# Patient Record
Sex: FEMALE | Race: WHITE | Employment: OTHER | URBAN - METROPOLITAN AREA
[De-identification: names, ages, dates, MRNs, and addresses within clinical notes are randomized per-mention and may not be internally consistent; named-entity substitution may affect disease eponyms.]

---

## 2017-01-09 ENCOUNTER — MYC MEDICAL ADVICE (OUTPATIENT)
Dept: PEDIATRICS | Facility: CLINIC | Age: 72
End: 2017-01-09

## 2017-01-09 DIAGNOSIS — E78.5 HYPERLIPIDEMIA LDL GOAL <70: Primary | ICD-10-CM

## 2017-01-09 DIAGNOSIS — R80.9 MICROALBUMINURIA: ICD-10-CM

## 2017-01-09 DIAGNOSIS — E11.42 TYPE 2 DIABETES, CONTROLLED, WITH PERIPHERAL NEUROPATHY (H): ICD-10-CM

## 2017-01-10 NOTE — TELEPHONE ENCOUNTER
Dr. Dietrich-order pending for Metformin 1000 mg twice daily vs Metformin ER-please sign if in agreement.  My chart message sent to patient to verify which [facility] address to send to?    Simvastatin 20 mg     Last Written Prescription Date: 04/15/16  Last Fill Quantity: 90, # refills: 3  Last Office Visit with [facility], [facility] or [facility] prescribing provider: 10/12/16       CHOL      126   4/15/2016  HDL       33   4/15/2016  LDL       27   4/15/2016  LDL       88   10/11/2012  TRIG      331   4/15/2016  CHOLHDLRATIO      3.9   4/10/2015  Patient has a three month supply remaining on current RX and then will need labs.    Lisinopril 40 mg      Last Written Prescription Date: 10/27/16  Last Fill Quantity: 90, # refills: 3  Last Office Visit with [facility], [facility] or [facility] prescribing provider: 10/12/16       POTASSIUM   Date Value Ref Range Status   10/12/2016 4.4 3.4 - 5.3 mmol/L Final     CREATININE   Date Value Ref Range Status   10/12/2016 0.83 0.52 - 1.04 mg/dL Final     BP Readings from Last 3 Encounters:   10/07/16 122/64   09/28/16 144/87   09/07/16 120/64   Patient still has 9 months of refills remaining on current RX.  VALERIE Queen RN

## 2017-01-13 NOTE — TELEPHONE ENCOUNTER
Don't want the one with that phone number because that is the one that the prescriptions must be mailed to not e-prescribed. Entered e[facility]prescribe [facility] contact.   Megan Fortune, RN

## 2017-01-15 RX ORDER — SIMVASTATIN 20 MG
20 TABLET ORAL AT BEDTIME
Qty: 90 TABLET | Refills: 2 | Status: SHIPPED | OUTPATIENT
Start: 2017-01-15 | End: 2017-11-15

## 2017-01-15 RX ORDER — LISINOPRIL 40 MG/1
40 TABLET ORAL DAILY
Qty: 90 TABLET | Refills: 2 | Status: SHIPPED | OUTPATIENT
Start: 2017-01-15 | End: 2017-10-30

## 2017-01-19 ENCOUNTER — TELEPHONE (OUTPATIENT)
Dept: PEDIATRICS | Facility: CLINIC | Age: 72
End: 2017-01-19

## 2017-01-19 DIAGNOSIS — E11.42 TYPE 2 DIABETES, CONTROLLED, WITH PERIPHERAL NEUROPATHY (H): Primary | ICD-10-CM

## 2017-01-19 RX ORDER — METFORMIN HCL 500 MG
1000 TABLET, EXTENDED RELEASE 24 HR ORAL 2 TIMES DAILY WITH MEALS
Qty: 360 TABLET | Refills: 0 | Status: SHIPPED | OUTPATIENT
Start: 2017-01-19 | End: 2017-04-18

## 2017-01-19 NOTE — TELEPHONE ENCOUNTER
Received fax from pharmacy that patient had been receiving metformin ER.  Script sent for ER formulation.  Niesha Dietrich MD

## 2017-01-21 NOTE — TELEPHONE ENCOUNTER
Pt's understanding was that medication would be changed to regular Metformin instead of Metformin ER due to insurance coverage. See 1/15/2017 [place] message with Dr Dietrich. Pt has 8 pills left of Metformin ER and is requesting change to regular Metformin. Pt states her blood sugar was 170 this morning pre breakfast.     Keiry Menard RN/[facility]

## 2017-01-23 NOTE — TELEPHONE ENCOUNTER
I spoke with Megan at [facility] and cancelled order for the Metformin  mg-2 tabs bid.  Advised that new RX has been sent for Metformin 500 mg-2 tablets twice daily.  Megan verified that they have received the new RX for Metformin and this will be shipped out this week.  VALERIE Queen RN

## 2017-01-23 NOTE — TELEPHONE ENCOUNTER
Patient is calling back stating she was told the Metformin XR is going to be shipped today and the order for the Metformin was cancelled.  I called [facility] again, and spoke with a pharmacy luzmaria-Toya (sandi), she verified that the Metformin XR was not cancelled and the Metformin had been cancelled.  Advised that I spoke with Megan earlier and was told that the Metformin XR was cancelled and would not be shipped.  She read back the order for the Metformin correctly.  I gave verbal order to Toya for the   metFORMIN (GLUCOPHAGE) 500 MG tablet 360 tablet 0 1/21/2017  No      Sig: Take 2 tablets (1,000 mg) by mouth 2 times daily (with meals)     I was then transferred to Medicare Member service, and spoke with Niesha (customer service) to cancel the order for the Metformin XR.  She has submitted to have the shipment of the Metformin XR stopped, and will process new order for Metformin.  Patient can not log into account in 24 hrs to see that Metformin XR was cancelled.  Metformin will be shipped and she should receive this in 5-6 days.    Patient notified.  Patient is out of medication..  One month medication sent to local pharmacy.  VALERIE Queen RN

## 2017-04-06 DIAGNOSIS — E78.5 HYPERLIPIDEMIA LDL GOAL <70: ICD-10-CM

## 2017-04-06 DIAGNOSIS — E11.42 TYPE 2 DIABETES, CONTROLLED, WITH PERIPHERAL NEUROPATHY (H): ICD-10-CM

## 2017-04-06 LAB
ALBUMIN SERPL-MCNC: 3.7 G/DL (ref 3.4–5)
ALP SERPL-CCNC: 93 U/L (ref 40–150)
ALT SERPL W P-5'-P-CCNC: 45 U/L (ref 0–50)
ANION GAP SERPL CALCULATED.3IONS-SCNC: 9 MMOL/L (ref 3–14)
AST SERPL W P-5'-P-CCNC: 54 U/L (ref 0–45)
BILIRUB SERPL-MCNC: 0.7 MG/DL (ref 0.2–1.3)
BUN SERPL-MCNC: 16 MG/DL (ref 7–30)
CALCIUM SERPL-MCNC: 10.4 MG/DL (ref 8.5–10.1)
CHLORIDE SERPL-SCNC: 102 MMOL/L (ref 94–109)
CHOLEST SERPL-MCNC: 131 MG/DL
CO2 SERPL-SCNC: 25 MMOL/L (ref 20–32)
CREAT SERPL-MCNC: 0.88 MG/DL (ref 0.52–1.04)
GFR SERPL CREATININE-BSD FRML MDRD: 63 ML/MIN/1.7M2
GLUCOSE SERPL-MCNC: 194 MG/DL (ref 70–99)
HBA1C MFR BLD: 7.8 % (ref 4.3–6)
HDLC SERPL-MCNC: 34 MG/DL
LDLC SERPL CALC-MCNC: 34 MG/DL
NONHDLC SERPL-MCNC: 97 MG/DL
POTASSIUM SERPL-SCNC: 4.7 MMOL/L (ref 3.4–5.3)
PROT SERPL-MCNC: 7.4 G/DL (ref 6.8–8.8)
SODIUM SERPL-SCNC: 136 MMOL/L (ref 133–144)
TRIGL SERPL-MCNC: 314 MG/DL

## 2017-04-06 PROCEDURE — 80061 LIPID PANEL: CPT | Performed by: INTERNAL MEDICINE

## 2017-04-06 PROCEDURE — 83036 HEMOGLOBIN GLYCOSYLATED A1C: CPT | Performed by: INTERNAL MEDICINE

## 2017-04-06 PROCEDURE — 36415 COLL VENOUS BLD VENIPUNCTURE: CPT | Performed by: INTERNAL MEDICINE

## 2017-04-06 PROCEDURE — 80053 COMPREHEN METABOLIC PANEL: CPT | Performed by: INTERNAL MEDICINE

## 2017-04-18 ENCOUNTER — OFFICE VISIT (OUTPATIENT)
Dept: PEDIATRICS | Facility: CLINIC | Age: 72
End: 2017-04-18
Payer: COMMERCIAL

## 2017-04-18 VITALS
OXYGEN SATURATION: 97 % | DIASTOLIC BLOOD PRESSURE: 62 MMHG | TEMPERATURE: 97.1 F | BODY MASS INDEX: 37.95 KG/M2 | SYSTOLIC BLOOD PRESSURE: 106 MMHG | WEIGHT: 214.2 LBS | HEIGHT: 63 IN | HEART RATE: 96 BPM

## 2017-04-18 DIAGNOSIS — E83.52 HYPERCALCEMIA: ICD-10-CM

## 2017-04-18 DIAGNOSIS — Z00.00 ROUTINE GENERAL MEDICAL EXAMINATION AT A HEALTH CARE FACILITY: Primary | ICD-10-CM

## 2017-04-18 DIAGNOSIS — Z13.89 SCREENING FOR DIABETIC PERIPHERAL NEUROPATHY: ICD-10-CM

## 2017-04-18 DIAGNOSIS — E11.42 TYPE 2 DIABETES, CONTROLLED, WITH PERIPHERAL NEUROPATHY (H): ICD-10-CM

## 2017-04-18 DIAGNOSIS — E78.5 HYPERLIPIDEMIA LDL GOAL <70: ICD-10-CM

## 2017-04-18 DIAGNOSIS — I10 HTN, GOAL BELOW 140/90: ICD-10-CM

## 2017-04-18 PROCEDURE — 99397 PER PM REEVAL EST PAT 65+ YR: CPT | Performed by: INTERNAL MEDICINE

## 2017-04-18 PROCEDURE — 99207 C FOOT EXAM  NO CHARGE: CPT | Mod: 25 | Performed by: INTERNAL MEDICINE

## 2017-04-18 PROCEDURE — 99213 OFFICE O/P EST LOW 20 MIN: CPT | Mod: 25 | Performed by: INTERNAL MEDICINE

## 2017-04-18 NOTE — PATIENT INSTRUCTIONS
Check sugars daily, sometimes first thing in the am and sometimes 2 hrs after dinner.    Try limiting sweets to every other day.          Preventive Health Recommendations    Female Ages 65 +    Yearly exam:     See your health care provider every year in order to  o Review health changes.   o Discuss preventive care.    o Review your medicines if your doctor has prescribed any.      You no longer need a yearly Pap test unless you've had an abnormal Pap test in the past 10 years. If you have vaginal symptoms, such as bleeding or discharge, be sure to talk with your provider about a Pap test.      Every 1 to 2 years, have a mammogram.  If you are over 69, talk with your health care provider about whether or not you want to continue having screening mammograms.      Every 10 years, have a colonoscopy. Or, have a yearly FIT test (stool test). These exams will check for colon cancer.       Have a cholesterol test every 5 years, or more often if your doctor advises it.       Have a diabetes test (fasting glucose) every three years. If you are at risk for diabetes, you should have this test more often.       At age 65, have a bone density scan (DEXA) to check for osteoporosis (brittle bone disease).    Shots:    Get a flu shot each year.    Get a tetanus shot every 10 years.    Talk to your doctor about your pneumonia vaccines. There are now two you should receive - Pneumovax (PPSV 23) and Prevnar (PCV 13).    Talk to your doctor about the shingles vaccine.    Talk to your doctor about the hepatitis B vaccine.    Nutrition:     Eat at least 5 servings of fruits and vegetables each day.      Eat whole-grain bread, whole-wheat pasta and brown rice instead of white grains and rice.      Talk to your provider about Calcium and Vitamin D.     Lifestyle    Exercise at least 150 minutes a week (30 minutes a day, 5 days a week). This will help you control your weight and prevent disease.      Limit alcohol to one drink per  day.      No smoking.       Wear sunscreen to prevent skin cancer.       See your dentist twice a year for an exam and cleaning.      See your eye doctor every 1 to 2 years to screen for conditions such as glaucoma, macular degeneration and cataracts.

## 2017-04-18 NOTE — PROGRESS NOTES
"  SUBJECTIVE:                                                            Debi Mo is a 72 year old female who presents for Preventive Visit.  Are you in the first 12 months of your Medicare Part B coverage?  No    Healthy Habits:    Do you get at least three servings of calcium containing foods daily (dairy, green leafy vegetables, etc.)? no    Amount of exercise or daily activities, outside of work: 0 day(s) per week    Problems taking medications regularly No    Medication side effects: Yes loose stools    Have you had an eye exam in the past two years? yes    Do you see a dentist twice per year? yes    Do you have sleep apnea, excessive snoring or daytime drowsiness?no    COGNITIVE SCREEN  1) Repeat 3 items (Banana, Sunrise, Chair)    2) Clock draw: NORMAL  3) 3 item recall: Recalls 3 objects  Results: 3 items recalled: COGNITIVE IMPAIRMENT LESS LIKELY    Mini-CogTM Copyright S Johanna. Licensed by the author for use in [place] [employer]; reprinted with permission (jonathon@[facility]). All rights reserved.      1) a1c up again at 7.8.  Has stopped checking blood sugars, feels that she \"eating more of the bad stuff\".  Did not start exercising.  Notes that her  broke, feels that has limited her exercise.  Would like to check sugars once a day.      Diabetes Follow-up      Patient is checking blood sugars: not at all    Diabetic concerns: None     Symptoms of hypoglycemia (low blood sugar): none     Paresthesias (numbness or burning in feet) or sores: Yes numbness     Date of last diabetic eye exam: has scheduled for 4/21/17     Hyperlipidemia Follow-Up      Rate your low fat/cholesterol diet?: not monitoring fat    Taking statin?  Yes, no muscle aches from statin    Other lipid medications/supplements?:  none     Hypertension Follow-up      Outpatient blood pressures are being checked at home.  Results are variable, sometimes high, sometimes low.    Low Salt Diet: not monitoring salt "         Reviewed and updated as needed this visit by clinical staff  Tobacco  Allergies  Meds  Med Hx  Surg Hx  Fam Hx  Soc Hx        Reviewed and updated as needed this visit by Provider        Social History   Substance Use Topics     Smoking status: Never Smoker     Smokeless tobacco: Never Used     Alcohol use No       The patient does not drink >3 drinks per day nor >7 drinks per week.    Today's PHQ-2 Score:   PHQ-2 ( 1999 [employer]) 7/12/2016 4/15/2016   Q1: Little interest or pleasure in doing things 0 0   Q2: Feeling down, depressed or hopeless 0 0   PHQ-2 Score 0 0       Do you feel safe in your environment - Yes    Do you have a Health Care Directive?: Yes: Advance Directive has been received and scanned.    Current providers sharing in care for this patient include:   Patient Care Team:  Niesha Dietrich MD as PCP - General (Pediatrics)  Aicha Doshi RN as       Hearing impairment: Yes, Difficulty following a conversation in a noisy restaurant or crowded room.    Ability to successfully perform activities of daily living: Yes, no assistance needed     Fall risk:  Fallen 2 or more times in the past year?: No  Any fall with injury in the past year?: No    Home safety:  none identified        The following health maintenance items are reviewed in Epic and correct as of today:  Health Maintenance   Topic Date Due     EYE EXAM Q1 YEAR( NO INBASKET)  04/06/2017     FOOT EXAM Q1 YEAR( NO INBASKET)  04/15/2017     FALL RISK ASSESSMENT  07/12/2017     MICROALBUMIN Q1 YEAR( NO INBASKET)  07/12/2017     INFLUENZA VACCINE (SYSTEM ASSIGNED)  09/01/2017     A1C Q6 MO( NO INBASKET)  10/06/2017     MAMMO SCREEN Q2 YR (SYSTEM ASSIGNED)  03/16/2018     CREATININE Q1 YEAR (NO INBASKET)  04/06/2018     LIPID MONITORING Q1 YEAR( NO INBASKET)  04/06/2018     DEXA Q5 YR  10/10/2018     TSH W/ FREE T4 REFLEX Q2 YEAR (NO INBASKET)  10/12/2018     TETANUS IMMUNIZATION (SYSTEM ASSIGNED)  06/01/2019      "COLON CANCER SCREEN (SYSTEM ASSIGNED)  01/01/2020     ADVANCE DIRECTIVE PLANNING Q5 YRS (NO INBASKET)  12/06/2021     PNEUMOCOCCAL  Completed     HEPATITIS C SCREENING  Completed         Pneumonia Vaccine:utd  Mammogram Screening: Patient over age 50, mutual decision to screen reflected in health maintenance.     ROS:  Constitutional, HEENT, cardiovascular, pulmonary, gi and gu systems are negative, except as otherwise noted.    Problem list, Medication list, Allergies, and Medical/Social/Surgical histories reviewed in [facility] and updated as appropriate.  OBJECTIVE:                                                            /62 (BP Location: Right arm, Patient Position: Chair, Cuff Size: Adult Large)  Pulse 96  Temp 97.1  F (36.2  C) (Tympanic)  Ht 5' 3\" (1.6 m)  Wt 214 lb 3.2 oz (97.2 kg)  SpO2 97%  BMI 37.94 kg/m2 Estimated body mass index is 37.94 kg/(m^2) as calculated from the following:    Height as of this encounter: 5' 3\" (1.6 m).    Weight as of this encounter: 214 lb 3.2 oz (97.2 kg).  EXAM:   GENERAL: healthy, alert and no distress  EYES: Eyes grossly normal to inspection, PERRL and conjunctivae and sclerae normal  HENT: ear canals and TM's normal, nose and mouth without ulcers or lesions  NECK: no adenopathy, no asymmetry, masses, or scars and thyroid normal to palpation  RESP: lungs clear to auscultation - no rales, rhonchi or wheezes  BREAST: normal without masses, tenderness or nipple discharge and no palpable axillary masses or adenopathy  CV: regular rate and rhythm, normal S1 S2, no S3 or S4, no murmur, click or rub, no peripheral edema and peripheral pulses strong  ABDOMEN: soft, nontender, no hepatosplenomegaly, no masses and bowel sounds normal  MS: no gross musculoskeletal defects noted, no edema  SKIN: no suspicious lesions or rashes  NEURO: Normal strength and tone, mentation intact and speech normal  PSYCH: mentation appears normal, affect normal/bright  Diabetic foot exam: normal " "DP and PT pulses, no trophic changes or ulcerative lesions, normal sensory exam and normal monofilament exam    ASSESSMENT / PLAN:                                                              Routine care  -mammo utd  -colon utd  -imm utd      (E11.42) Type 2 diabetes, controlled, with peripheral neuropathy (H)  (primary encounter diagnosis)  -discussed increasing medications to bring down a1c  -pt would like to work on diet and exercise for 3 months  -pt agreed to testing sugars daily  -if a1c not better at next visit would consider adding actos to existing metformin   Plan: Hemoglobin A1c, metFORMIN (GLUCOPHAGE) 500 MG         tablet         (E83.52) Hypercalcemia  -not on any additional calcium  -will check hyperparathyroid labs with next lab draw in 3 months   Plan: Calcium, Vitamin D Deficiency, PTH  Intact         ([facility]), Phosphorus, Calcium ionized,         CANCELED: Calcium ionized          (I10) HTN, goal below 140/90  -bp well controlled  -renal function normal     (E78.5) Hyperlipidemia LDL goal <70  -cholesterol at goal on 20 simvastatin     (Z13.89) Screening for diabetic peripheral neuropathy  Plan: FOOT EXAM  NO CHARGE [46807.114]          End of Life Planning:  Patient currently has an advanced directive: Yes.  Practitioner is supportive of decision.    COUNSELING:  Reviewed preventive health counseling, as reflected in patient instructions       Regular exercise       Healthy diet/nutrition        Estimated body mass index is 37.94 kg/(m^2) as calculated from the following:    Height as of this encounter: 5' 3\" (1.6 m).    Weight as of this encounter: 214 lb 3.2 oz (97.2 kg).  Weight management plan: Discussed healthy diet and exercise guidelines and patient will follow up in 12 months in clinic to re-evaluate.   reports that she has never smoked. She has never used smokeless tobacco.      Appropriate preventive services were discussed with this patient, including applicable screening as " appropriate for cardiovascular disease, diabetes, osteopenia/osteoporosis, and glaucoma.  As appropriate for age/gender, discussed screening for colorectal cancer, prostate cancer, breast cancer, and cervical cancer. Checklist reviewing preventive services available has been given to the patient.    Reviewed patients plan of care and provided an AVS. The Intermediate Care Plan ( asthma action plan, low back pain action plan, and migraine action plan) for Debi meets the Care Plan requirement. This Care Plan has been established and reviewed with the Patient.    Counseling Resources:  ATP IV Guidelines  Pooled Cohorts Equation Calculator  Breast Cancer Risk Calculator  FRAX Risk Assessment  ICSI Preventive Guidelines  Dietary Guidelines for Americans, 2010  USDA's MyPlate  ASA Prophylaxis  Lung CA Screening    Niesha Dietrich MD  [facility]

## 2017-04-18 NOTE — NURSING NOTE
"Chief Complaint   Patient presents with     Medicare Visit       Initial /62 (BP Location: Right arm, Patient Position: Chair, Cuff Size: Adult Large)  Pulse 96  Temp 97.1  F (36.2  C) (Tympanic)  Ht 5' 3\" (1.6 m)  Wt 214 lb 3.2 oz (97.2 kg)  SpO2 97%  BMI 37.94 kg/m2 Estimated body mass index is 37.94 kg/(m^2) as calculated from the following:    Height as of this encounter: 5' 3\" (1.6 m).    Weight as of this encounter: 214 lb 3.2 oz (97.2 kg).  Medication Reconciliation: complete   Poppy Bowden LPN      "

## 2017-04-18 NOTE — PROGRESS NOTES
"SUBJECTIVE:                                                            Debi Mo is a 72 year old female who presents for Preventive Visit.      Are you in the first 12 months of your Medicare coverage?  No    HPI    {AWV Cognitive Screenin}    Reviewed and updated as needed this visit by clinical staff  Tobacco  Allergies  Meds  Med Hx  Surg Hx  Fam Hx  Soc Hx        Reviewed and updated as needed this visit by Provider        Social History   Substance Use Topics     Smoking status: Never Smoker     Smokeless tobacco: Never Used     Alcohol use No       {ETOH AUDIT:705684}    {Outside tests to abstract? :590498}    {additional problems to add:434954}    Today's PHQ-2 Score:   PHQ-2 (  [employer]) 2016   Q1: Little interest or pleasure in doing things 0   Q2: Feeling down, depressed or hopeless 0   PHQ-2 Score 0       Do you feel safe in your environment - {YES/NO/NA:510875}    Do you have a Health Care Directive?: {HEALTHCARE DIRECTIVE STATUS:830394}    Current providers sharing in care for this patient include:   Patient Care Team:  Niesha Dietrich MD as PCP - General (Pediatrics)  Aicha Doshi RN as       Hearing impairment: {NO/YES:398570}    Ability to successfully perform activities of daily living: {YES/NO (MEDICARE):505879::\"Yes, no assistance needed\"}     Fall risk:  {Document Fall Risk in the Assessments Section of the Navigator:339922}    Home safety:  {IPPE SAFETY CONCERNS:298403::\"none identified\"}  {If any of the above assessments are answered yes, consider ordering appropriate referrals:719363::\"click delete button to remove this line now\"}    The following health maintenance items are reviewed in Epic and correct as of today:  Health Maintenance   Topic Date Due     EYE EXAM Q1 YEAR( NO INBASKET)  2017     FOOT EXAM Q1 YEAR( NO INBASKET)  04/15/2017     FALL RISK ASSESSMENT  2017     MICROALBUMIN Q1 YEAR( NO INBASKET)  2017     " "INFLUENZA VACCINE (SYSTEM ASSIGNED)  2017     A1C Q6 MO( NO INBASKET)  10/06/2017     MAMMO SCREEN Q2 YR (SYSTEM ASSIGNED)  2018     CREATININE Q1 YEAR (NO INBASKET)  2018     LIPID MONITORING Q1 YEAR( NO INBASKET)  2018     DEXA Q5 YR  10/10/2018     TSH W/ FREE T4 REFLEX Q2 YEAR (NO INBASKET)  10/12/2018     TETANUS IMMUNIZATION (SYSTEM ASSIGNED)  2019     COLON CANCER SCREEN (SYSTEM ASSIGNED)  2020     ADVANCE DIRECTIVE PLANNING Q5 YRS (NO INBASKET)  2021     PNEUMOCOCCAL  Completed     HEPATITIS C SCREENING  Completed         {Decision Support:247103}     ROS:  {ROS COMP:662506}    {CHRONICPROBDATA:449124}  OBJECTIVE:                                                            /62 (BP Location: Right arm, Patient Position: Chair, Cuff Size: Adult Large)  Pulse 96  Temp 97.1  F (36.2  C) (Tympanic)  Ht 5' 3\" (1.6 m)  Wt 214 lb 3.2 oz (97.2 kg)  SpO2 97%  BMI 37.94 kg/m2 Estimated body mass index is 37.94 kg/(m^2) as calculated from the following:    Height as of this encounter: 5' 3\" (1.6 m).    Weight as of this encounter: 214 lb 3.2 oz (97.2 kg).  EXAM:   {Exam :852404}    ASSESSMENT / PLAN:                                                            {Diag Picklist:366603}    End of Life Planning:  Patient currently has an advanced directive: { :007065}    COUNSELING:  {Medicare Counselin}    {Blood Pressure - Adult Preventive:333651}    Estimated body mass index is 37.94 kg/(m^2) as calculated from the following:    Height as of this encounter: 5' 3\" (1.6 m).    Weight as of this encounter: 214 lb 3.2 oz (97.2 kg).  {Weight Management Plan -- Delete if patient has a normal BMI:632944}   reports that she has never smoked. She has never used smokeless tobacco.  {Tobacco Cessation -- Delete if patient is a non-smoker:036350}    Appropriate preventive services were discussed with this patient, including applicable screening as appropriate for " cardiovascular disease, diabetes, osteopenia/osteoporosis, and glaucoma.  As appropriate for age/gender, discussed screening for colorectal cancer, prostate cancer, breast cancer, and cervical cancer. Checklist reviewing preventive services available has been given to the patient.    Reviewed patients plan of care and provided an AVS. The {CarePlan:729025} for Debi meets the Care Plan requirement. This Care Plan has been established and reviewed with the {PATIENT, FAMILY MEMBER, CAREGIVER:081246}.    Counseling Resources:  ATP IV Guidelines  Pooled Cohorts Equation Calculator  Breast Cancer Risk Calculator  FRAX Risk Assessment  ICSI Preventive Guidelines  Dietary Guidelines for Americans, 2010  USDA's MyPlate  ASA Prophylaxis  Lung CA Screening    Niesha Dietrich MD  [facility]

## 2017-04-18 NOTE — MR AVS SNAPSHOT
After Visit Summary   4/18/2017    Debi Mo    MRN: [id number]           Patient Information     Date Of Birth          1945        Visit Information        Provider Department      4/18/2017 10:00 AM Niesha Dietrich MD [facility] [place]        Today's Diagnoses     Type 2 diabetes, controlled, with peripheral neuropathy (H)    -  1    Hypercalcemia        HTN, goal below 140/90        Hyperlipidemia LDL goal <70        Screening for diabetic peripheral neuropathy          Care Instructions    Check sugars daily, sometimes first thing in the am and sometimes 2 hrs after dinner.    Try limiting sweets to every other day.          Preventive Health Recommendations    Female Ages 65 +    Yearly exam:     See your health care provider every year in order to  o Review health changes.   o Discuss preventive care.    o Review your medicines if your doctor has prescribed any.      You no longer need a yearly Pap test unless you've had an abnormal Pap test in the past 10 years. If you have vaginal symptoms, such as bleeding or discharge, be sure to talk with your provider about a Pap test.      Every 1 to 2 years, have a mammogram.  If you are over 69, talk with your health care provider about whether or not you want to continue having screening mammograms.      Every 10 years, have a colonoscopy. Or, have a yearly FIT test (stool test). These exams will check for colon cancer.       Have a cholesterol test every 5 years, or more often if your doctor advises it.       Have a diabetes test (fasting glucose) every three years. If you are at risk for diabetes, you should have this test more often.       At age 65, have a bone density scan (DEXA) to check for osteoporosis (brittle bone disease).    Shots:    Get a flu shot each year.    Get a tetanus shot every 10 years.    Talk to your doctor about your pneumonia vaccines. There are now two you should receive - Pneumovax (PPSV 23) and  Prevnar (PCV 13).    Talk to your doctor about the shingles vaccine.    Talk to your doctor about the hepatitis B vaccine.    Nutrition:     Eat at least 5 servings of fruits and vegetables each day.      Eat whole-grain bread, whole-wheat pasta and brown rice instead of white grains and rice.      Talk to your provider about Calcium and Vitamin D.     Lifestyle    Exercise at least 150 minutes a week (30 minutes a day, 5 days a week). This will help you control your weight and prevent disease.      Limit alcohol to one drink per day.      No smoking.       Wear sunscreen to prevent skin cancer.       See your dentist twice a year for an exam and cleaning.      See your eye doctor every 1 to 2 years to screen for conditions such as glaucoma, macular degeneration and cataracts.        Follow-ups after your visit        Your next 10 appointments already scheduled     Apr 20, 2017 10:00 AM CDT   MA SCREENING DIGITAL BILATERAL with EAMA1   [facility] [place] ([facility])    [address]  [place] [address] [phone]           Do not use any powder, lotion or deodorant under your arms or on your breast. If you do, we will ask you to remove it before your exam.  Wear comfortable, two-piece clothing.  If you have any allergies, tell your care team.  Bring any previous mammograms from other facilities or have them mailed to the breast center.            Jul 13, 2017 10:00 AM CDT   LAB with EA LAB   [facility] [place] ([facility])    [address]  [address]  [place] [address] [phone]           Patient must bring picture ID.  Patient should be prepared to give a urine specimen  Please do not eat 10-12 hours before your appointment if you are coming in fasting for labs on lipids, cholesterol, or glucose (sugar).  Pregnant women should follow their Care Team instructions. Water with medications is okay. Do not drink coffee or other  fluids.   If you have concerns about taking  your medications, please ask at office or if scheduling via [email or web address], send a message by clicking on Secure Messaging, Message Your Care Team.            Jul 13, 2017 10:40 AM CDT   Office Visit with Niesha Dietrich MD   [facility] [place] ([facility])    [address]  [address]  [place] [address]   [phone]           Bring a current list of meds and any records pertaining to this visit.  For Physicals, please bring immunization records and any forms needing to be filled out.  Please arrive 10 minutes early to complete paperwork.              Future tests that were ordered for you today     Open Future Orders        Priority Expected Expires Ordered    Calcium ionized Routine  8/18/2017 4/18/2017    Hemoglobin A1c Routine  8/18/2017 4/18/2017    Calcium Routine  8/18/2017 4/18/2017    Vitamin D Deficiency Routine  8/18/2017 4/18/2017    PTH  Intact ([facility]) Routine  8/18/2017 4/18/2017    Phosphorus Routine  8/18/2017 4/18/2017            Who to contact     If you have questions or need follow up information about today's clinic visit or your schedule please contact [facility] directly at [phone].  Normal or non-critical lab and imaging results will be communicated to you by Carelandhart, letter or phone within 4 business days after the clinic has received the results. If you do not hear from us within 7 days, please contact the clinic through Carelandhart or phone. If you have a critical or abnormal lab result, we will notify you by phone as soon as possible.  Submit refill requests through [email or web address] or call your pharmacy and they will forward the refill request to us. Please allow 3 business days for your refill to be completed.          Additional Information About Your Visit        [email or web address] Information     [email or web address] gives you secure access to your electronic health record. If you see a primary care provider, you can  "also send messages to your care team and make appointments. If you have questions, please call your primary care clinic.  If you do not have a primary care provider, please call [phone] and they will assist you.        Care EveryWhere ID     This is your Care EveryWhere ID. This could be used by other organizations to access your [facility] medical records  [id number]        Your Vitals Were     Pulse Temperature Height Pulse Oximetry BMI (Body Mass Index)       96 97.1  F (36.2  C) (Tympanic) 5' 3\" (1.6 m) 97% 37.94 kg/m2        Blood Pressure from Last 3 Encounters:   04/18/17 106/62   10/07/16 122/64   09/28/16 144/87    Weight from Last 3 Encounters:   04/18/17 214 lb 3.2 oz (97.2 kg)   10/07/16 211 lb 8 oz (95.9 kg)   09/28/16 211 lb (95.7 kg)              We Performed the Following     Calcium ionized     FOOT EXAM  NO CHARGE [66583.114]          Today's Medication Changes          These changes are accurate as of: 4/18/17 11:09 AM.  If you have any questions, ask your nurse or doctor.               These medicines have changed or have updated prescriptions.        Dose/Directions    metFORMIN 500 MG tablet   Commonly known as:  GLUCOPHAGE   This may have changed:  Another medication with the same name was removed. Continue taking this medication, and follow the directions you see here.   Used for:  Type 2 diabetes, controlled, with peripheral neuropathy (H)   Changed by:  Niesha Dietrich MD        Dose:  1000 mg   Take 2 tablets (1,000 mg) by mouth 2 times daily (with meals)   Quantity:  120 tablet   Refills:  1       potassium citrate 10 MEQ (1080 MG) CR tablet   Commonly known as:  UROCIT-K   This may have changed:  Another medication with the same name was removed. Continue taking this medication, and follow the directions you see here.   Used for:  Calculus of kidney   Changed by:  Lester Ornoa MD        Dose:  10 mEq   Take 1 tablet (10 mEq) by mouth 3 times daily (with meals) "   Quantity:  270 tablet   Refills:  11         Stop taking these medicines if you haven't already. Please contact your care team if you have questions.     potassium citrate-citric acid 3948-9331 MG Packet   Commonly known as:  POLYCITRA-K/CYTRA K   Stopped by:  Niesha Dietrich MD                Where to get your medicines      These medications were sent to [facility] - [place], [place] - [address]  [address] [address]     Phone:  [phone]     metFORMIN 500 MG tablet                Primary Care Provider Office Phone # Fax #    Niesha Dietrich [phone]       [address] [place] [address]        Thank you!     Thank you for choosing [facility]  for your care. Our goal is always to provide you with excellent care. Hearing back from our patients is one way we can continue to improve our services. Please take a few minutes to complete the written survey that you may receive in the mail after your visit with us. Thank you!             Your Updated Medication List - Protect others around you: Learn how to safely use, store and throw away your medicines at www.disposemymeds.org.          This list is accurate as of: 4/18/17 11:09 AM.  Always use your most recent med list.                   Brand Name Dispense Instructions for use    blood glucose monitoring lancets     1 Box    Use to test blood sugars 1 times daily or as directed.       blood glucose monitoring meter device kit     1 kit    Use to test blood sugars 1 times daily or as directed.       blood glucose monitoring test strip    ONE TOUCH VERIO IQ    50 each    Use to test blood sugars 1 times daily or as directed.       CO Q 10 PO      Take 100 mg by mouth daily       cyanocobalamin 1000 MCG tablet    vitamin  B-12    100 tablet    Take 1 tablet by mouth daily.       lisinopril 40 MG tablet    PRINIVIL/ZESTRIL    90  tablet    Take 1 tablet (40 mg) by mouth daily       loratadine 10 MG tablet    CLARITIN    30 tablet    1 tab in the evening daily.       metFORMIN 500 MG tablet    GLUCOPHAGE    120 tablet    Take 2 tablets (1,000 mg) by mouth 2 times daily (with meals)       potassium citrate 10 MEQ (1080 MG) CR tablet    UROCIT-K    270 tablet    Take 1 tablet (10 mEq) by mouth 3 times daily (with meals)       simvastatin 20 MG tablet    ZOCOR    90 tablet    Take 1 tablet (20 mg) by mouth At Bedtime       VITAMIN D3 PO      Take 4,000 Units by mouth daily.

## 2017-04-20 ENCOUNTER — RADIANT APPOINTMENT (OUTPATIENT)
Dept: MAMMOGRAPHY | Facility: CLINIC | Age: 72
End: 2017-04-20
Attending: INTERNAL MEDICINE
Payer: COMMERCIAL

## 2017-04-20 DIAGNOSIS — Z12.31 VISIT FOR SCREENING MAMMOGRAM: ICD-10-CM

## 2017-04-20 PROCEDURE — G0202 SCR MAMMO BI INCL CAD: HCPCS | Mod: TC

## 2017-04-21 ENCOUNTER — TRANSFERRED RECORDS (OUTPATIENT)
Dept: HEALTH INFORMATION MANAGEMENT | Facility: CLINIC | Age: 72
End: 2017-04-21

## 2017-04-24 DIAGNOSIS — N20.0 CALCULUS OF KIDNEY: ICD-10-CM

## 2017-04-24 RX ORDER — POTASSIUM CITRATE 10 MEQ/1
10 TABLET, EXTENDED RELEASE ORAL
Qty: 270 TABLET | Refills: 1 | Status: SHIPPED | OUTPATIENT
Start: 2017-04-24 | End: 2017-09-26

## 2017-05-01 DIAGNOSIS — E11.42 TYPE 2 DIABETES, CONTROLLED, WITH PERIPHERAL NEUROPATHY (H): ICD-10-CM

## 2017-05-01 NOTE — TELEPHONE ENCOUNTER
Glucophage 500mg  Last Written Prescription Date: 4/18/17  Last Fill Quantity: 120, # refills: 1  Last Office Visit with [facility], [facility] or  Health prescribing provider:  4/18/17   Next 5 appointments (look out 90 days)     Jul 13, 2017 10:40 AM CDT   Office Visit with Niesha Dietrich MD   [facility] [place] ([facility])    [address] [place] [address]  [address] [place] [address] [phone]                   BP Readings from Last 3 Encounters:   04/18/17 106/62   10/07/16 122/64   09/28/16 144/87     Lab Results   Component Value Date    MICROL 54 07/12/2016     Lab Results   Component Value Date    UMALCR 29.94 07/12/2016     Creatinine   Date Value Ref Range Status   04/06/2017 0.88 0.52 - 1.04 mg/dL Final   ]  GFR Estimate   Date Value Ref Range Status   04/06/2017 63 >60 mL/min/1.7m2 Final     Comment:     Non  GFR Calc   10/12/2016 68 >60 mL/min/1.7m2 Final     Comment:     Non  GFR Calc   02/19/2016 42 (L) >60 mL/min/1.7m2 Final     Comment:     Non  GFR Calc     GFR Estimate If Black   Date Value Ref Range Status   04/06/2017 77 >60 mL/min/1.7m2 Final     Comment:      GFR Calc   10/12/2016 82 >60 mL/min/1.7m2 Final     Comment:      GFR Calc   02/19/2016 50 (L) >60 mL/min/1.7m2 Final     Comment:      GFR Calc     Lab Results   Component Value Date    CHOL 131 04/06/2017     Lab Results   Component Value Date    HDL 34 04/06/2017     Lab Results   Component Value Date    LDL 34 04/06/2017     Lab Results   Component Value Date    TRIG 314 04/06/2017     Lab Results   Component Value Date    CHOLHDLRATIO 3.9 04/10/2015     Lab Results   Component Value Date    AST 54 04/06/2017     Lab Results   Component Value Date    ALT 45 04/06/2017     Lab Results   Component Value Date    A1C 7.8 04/06/2017    A1C 7.1 10/12/2016    A1C 7.2 07/12/2016    A1C 7.6 04/15/2016    A1C 6.6 10/15/2015      Potassium   Date Value Ref Range Status   04/06/2017 4.7 3.4 - 5.3 mmol/L Final       **Patient needs to have a 90 day supply sent to the pharmacy per insurance.    Cari Bello,   [facility]

## 2017-05-02 NOTE — TELEPHONE ENCOUNTER
3 month mail order pharmacy.   Patient has OV scheduled 7/13 with pcp.    Prescription approved per [facility] Refill Protocol.    Nathalie Moon RN

## 2017-07-12 NOTE — PROGRESS NOTES
SUBJECTIVE:                                                    Debi Mo is a 72 year old female who presents to clinic today for the following health issues:      Diabetes Follow-up      Patient is checking blood sugars: 2-3 times a week Results: Am: 177-194s , Pm: 161-200s    Diabetic concerns: None     Symptoms of hypoglycemia (low blood sugar): none     Paresthesias (numbness or burning in feet) or sores: Yes      Date of last diabetic eye exam: 4/21/2017    Hyperlipidemia Follow-Up      Rate your low fat/cholesterol diet?: not monitoring fat    Taking statin?  Yes, muscle aches after starting statin, taking Co Q 10    Other lipid medications/supplements?:  none    Hypertension Follow-up      Outpatient blood pressures are not being checked.    Low Salt Diet: not monitoring salt    Vascular Disease Follow-up:  Coronary Artery Disease (CAD)      Chest pain or pressure, left side neck or arm pain: No    Shortness of breath/increased sweats/nausea with exertion: Yes harder to carry things up the stairs    Pain in calves walking 1-2 blocks: No    Worsened or new symptoms since last visit: No    Nitroglycerin use: no    Daily aspirin use: Yes      Amount of exercise or physical activity: None    Problems taking medications regularly: No    Medication side effects: muscle aches    Diet: regular (no restrictions)    1) fasting sugars running 180's-200, after dinner 180's to over 220.  No low blood sugars. switched from metformin ER to regular metformin for financial reasons. Is taking BID.  Diarrhea better than with XR version.   2)  Notes one episode where she had taken a hike which was too hard for her.  Felt nauseated, lost control of her bladder, stumbled a couple of times, had difficulty talking.  Ate some peanuts, got better once she got the air conditioner on and felt better in 5-10 min.  Was short of breath when she was climbing up on the trail.  No chest pain.    3) htn:  Checks bp periodically, has  "been \"normal\" but can't give me numbers.        Problem list and histories reviewed & adjusted, as indicated.  Additional history: as documented    Patient Active Problem List   Diagnosis     Seasonal allergic rhinitis     Advance Care Planning     Vitamin B12 deficiency without anemia     High triglycerides     Benign hypertension with CKD (chronic kidney disease) stage I     History of nephrolithiasis     Serum calcium elevated     Hyperlipidemia LDL goal <70     Peripheral neuropathy (H)     Health Care Home     LBBB (left bundle branch block)     Vitamin D deficiency     Microalbuminuria     Coronary artery disease involving native coronary artery without angina pectoris     Morbid obesity due to excess calories (H)     Type 2 diabetes mellitus with diabetic polyneuropathy, without long-term current use of insulin (H)     Past Surgical History:   Procedure Laterality Date     CARDIAC SURGERY      ANGIOGRAM     CHOLECYSTECTOMY       COLONOSCOPY       COMBINED CYSTOSCOPY, RETROGRADES, URETEROSCOPY, LASER HOLMIUM LITHOTRIPSY URETER(S), INSERT STENT Bilateral 9/28/2016    Procedure: COMBINED CYSTOSCOPY, RETROGRADES, URETEROSCOPY, LASER HOLMIUM LITHOTRIPSY URETER(S), INSERT STENT;  Surgeon: Lester Orona MD;  Location:  OR     CYSTOSCOPY, RETROGRADES, COMBINED  4/17/2013    Procedure: COMBINED CYSTOSCOPY, RETROGRADES;;  Surgeon: Lester Orona MD;  Location: [facility] OR     CYSTOSCOPY, URETEROSCOPY, COMBINED  11/12/2013    Procedure: COMBINED CYSTOSCOPY, URETEROSCOPY;  CYSTOSCOPY, LEFT URETEROSCOPY, LEFT EXTRACORPOREAL SHOCKWAVE LITHOTRIPSY  ;  Surgeon: Lester Orona MD;  Location: [facility] OR     EXTRACORPOREAL SHOCK WAVE LITHOTRIPSY (ESWL)  11/12/2013    Procedure: EXTRACORPOREAL SHOCK WAVE LITHOTRIPSY (ESWL);;  Surgeon: Lester Orona MD;  Location: [facility] OR     EXTRACORPOREAL SHOCK WAVE LITHOTRIPSY (ESWL) Bilateral 4/28/2015    Procedure: EXTRACORPOREAL SHOCK WAVE LITHOTRIPSY (ESWL);  " "Surgeon: Lester Orona MD;  Location: [facility] OR     EXTRACORPOREAL SHOCK WAVE LITHOTRIPSY, CYSTOSCOPY, INSERT STENT URETER(S), COMBINED  1/10/2012    Procedure:COMBINED EXTRACORPOREAL SHOCK WAVE LITHOTRIPSY, CYSTOSCOPY, INSERT STENT URETER(S); LEFT EXTRACORPOREAL SHOCKWAVE LITHOTRIPSY, LEFT STENT; Surgeon:LESTER ORONA; Location:[facility] OR     GALLBLADDER SURGERY  2009     HYSTERECTOMY VAGINAL  1993    left  the ovaries     LASER HOLMIUM LITHOTRIPSY URETER(S), INSERT STENT, COMBINED  4/17/2013    Procedure: COMBINED CYSTOSCOPY, URETEROSCOPY, LASER HOLMIUM LITHOTRIPSY URETER(S), INSERT STENT;  CYSTOSCOPY, Right URETEROSCOPY, LASER HOLMIUM LITHOTRIPSY URETER(S) standby only,Stone basketing, Right Retrogrades, ;  Surgeon: Lester Orona MD;  Location: [facility] OR     SHOULDER SURGERY  2009    left rotator cuff       Social History   Substance Use Topics     Smoking status: Never Smoker     Smokeless tobacco: Never Used     Alcohol use No     Family History   Problem Relation Age of Onset     Respiratory Mother      CHF     Alzheimer Disease Mother      Dementia     HEART DISEASE Father      Heart Attack     Respiratory Father      CHF     Breast Cancer Maternal Grandmother      Breast Cancer Paternal Grandmother      Arthritis Brother      Arthritis Sister      Hypertension Son      DIABETES Daughter      Arthritis Sister      Arthritis Sister            Reviewed and updated as needed this visit by clinical staff  Tobacco  Allergies  Meds  Med Hx       Reviewed and updated as needed this visit by Provider         ROS:  Constitutional, HEENT, cardiovascular, pulmonary, gi and gu systems are negative, except as otherwise noted.    OBJECTIVE:     /72 (BP Location: Left arm, Patient Position: Chair, Cuff Size: Adult Large)  Pulse 96  Temp 97.9  F (36.6  C) (Tympanic)  Ht 5' 3\" (1.6 m)  Wt 212 lb 3 oz (96.2 kg)  SpO2 96%  BMI 37.59 kg/m2  Body mass index is 37.59 kg/(m^2).  GENERAL: healthy, " alert and no distress  EYES: Eyes grossly normal to inspection, PERRL and conjunctivae and sclerae normal  HENT: ear canals and TM's normal, nose and mouth without ulcers or lesions  NECK: no adenopathy, no asymmetry, masses, or scars and thyroid normal to palpation  RESP: lungs clear to auscultation - no rales, rhonchi or wheezes  CV: regular rate and rhythm, normal S1 S2, no S3 or S4, no murmur, click or rub, no peripheral edema and peripheral pulses strong  ABDOMEN: soft, nontender, no hepatosplenomegaly, no masses and bowel sounds normal  MS: no gross musculoskeletal defects noted, no edema  Diabetic foot exam: normal DP and PT pulses, no trophic changes or ulcerative lesions and normal sensory exam    Diagnostic Test Results:  Results for orders placed or performed in visit on 07/13/17 (from the past 24 hour(s))   Hemoglobin A1c   Result Value Ref Range    Hemoglobin A1C 7.5 (H) 4.3 - 6.0 %       ASSESSMENT/PLAN:       (E11.42) Type 2 diabetes mellitus with diabetic polyneuropathy, without long-term current use of insulin (H)  (primary encounter diagnosis)  -a1c within goal range of under 8 on metformin alone  -no change to medications, work on diet and exercise to help with blood sugars  -also on ace, statin, asa  Plan: metFORMIN (GLUCOPHAGE) 500 MG tablet, blood         glucose monitoring (ONE TOUCH DELICA) lancets,         blood glucose monitoring (ONE TOUCH VERIO IQ)         test strip          (I25.10) Coronary artery disease involving native coronary artery of native heart without angina pectoris  -pt with known CAD and had spell of GARCIA and near syncope with exertion  -will schedule exercise stress prior to starting exercise program   Plan: Exercise Stress Echocardiogram            (R06.09) GARCIA (dyspnea on exertion)  -unclear if episode was heat exhaustion or related to cad  -will check stress test    (I12.9,  N18.1) Benign hypertension with CKD (chronic kidney disease) stage I  -bp controlled  -on  ace    FUTURE APPOINTMENTS:       - Follow-up visit in 3 months     Niesha Dietrich MD  [facility]

## 2017-07-13 ENCOUNTER — OFFICE VISIT (OUTPATIENT)
Dept: PEDIATRICS | Facility: CLINIC | Age: 72
End: 2017-07-13
Payer: COMMERCIAL

## 2017-07-13 VITALS
TEMPERATURE: 97.9 F | HEIGHT: 63 IN | OXYGEN SATURATION: 96 % | BODY MASS INDEX: 37.6 KG/M2 | HEART RATE: 96 BPM | DIASTOLIC BLOOD PRESSURE: 72 MMHG | SYSTOLIC BLOOD PRESSURE: 130 MMHG | WEIGHT: 212.19 LBS

## 2017-07-13 DIAGNOSIS — E11.42 TYPE 2 DIABETES, CONTROLLED, WITH PERIPHERAL NEUROPATHY (H): ICD-10-CM

## 2017-07-13 DIAGNOSIS — E11.42 TYPE 2 DIABETES MELLITUS WITH DIABETIC POLYNEUROPATHY, WITHOUT LONG-TERM CURRENT USE OF INSULIN (H): Primary | ICD-10-CM

## 2017-07-13 DIAGNOSIS — R06.09 DOE (DYSPNEA ON EXERTION): ICD-10-CM

## 2017-07-13 DIAGNOSIS — I10 HTN, GOAL BELOW 140/90: ICD-10-CM

## 2017-07-13 DIAGNOSIS — I12.9 BENIGN HYPERTENSION WITH CKD (CHRONIC KIDNEY DISEASE) STAGE I: ICD-10-CM

## 2017-07-13 DIAGNOSIS — E83.52 HYPERCALCEMIA: ICD-10-CM

## 2017-07-13 DIAGNOSIS — I25.10 CORONARY ARTERY DISEASE INVOLVING NATIVE CORONARY ARTERY OF NATIVE HEART WITHOUT ANGINA PECTORIS: ICD-10-CM

## 2017-07-13 DIAGNOSIS — N18.1 BENIGN HYPERTENSION WITH CKD (CHRONIC KIDNEY DISEASE) STAGE I: ICD-10-CM

## 2017-07-13 PROBLEM — E11.52 TYPE 2 DIABETES MELLITUS WITH DIABETIC PERIPHERAL ANGIOPATHY AND GANGRENE, WITHOUT LONG-TERM CURRENT USE OF INSULIN (H): Status: RESOLVED | Noted: 2017-07-13 | Resolved: 2017-07-13

## 2017-07-13 PROBLEM — E66.01 MORBID OBESITY DUE TO EXCESS CALORIES (H): Status: ACTIVE | Noted: 2017-07-13

## 2017-07-13 PROBLEM — E11.52 TYPE 2 DIABETES MELLITUS WITH DIABETIC PERIPHERAL ANGIOPATHY AND GANGRENE, WITHOUT LONG-TERM CURRENT USE OF INSULIN (H): Status: ACTIVE | Noted: 2017-07-13

## 2017-07-13 LAB
CA-I SERPL ISE-MCNC: 5.2 MG/DL (ref 4.4–5.2)
CALCIUM SERPL-MCNC: 10.1 MG/DL (ref 8.5–10.1)
CREAT UR-MCNC: 168 MG/DL
HBA1C MFR BLD: 7.5 % (ref 4.3–6)
MICROALBUMIN UR-MCNC: 31 MG/L
MICROALBUMIN/CREAT UR: 18.27 MG/G CR (ref 0–25)
PHOSPHATE SERPL-MCNC: 3.1 MG/DL (ref 2.5–4.5)
PTH-INTACT SERPL-MCNC: 33 PG/ML (ref 12–72)

## 2017-07-13 PROCEDURE — 83970 ASSAY OF PARATHORMONE: CPT | Performed by: INTERNAL MEDICINE

## 2017-07-13 PROCEDURE — 82330 ASSAY OF CALCIUM: CPT | Performed by: INTERNAL MEDICINE

## 2017-07-13 PROCEDURE — 82310 ASSAY OF CALCIUM: CPT | Performed by: INTERNAL MEDICINE

## 2017-07-13 PROCEDURE — 82306 VITAMIN D 25 HYDROXY: CPT | Performed by: INTERNAL MEDICINE

## 2017-07-13 PROCEDURE — 36415 COLL VENOUS BLD VENIPUNCTURE: CPT | Performed by: INTERNAL MEDICINE

## 2017-07-13 PROCEDURE — 99214 OFFICE O/P EST MOD 30 MIN: CPT | Performed by: INTERNAL MEDICINE

## 2017-07-13 PROCEDURE — 83036 HEMOGLOBIN GLYCOSYLATED A1C: CPT | Performed by: INTERNAL MEDICINE

## 2017-07-13 PROCEDURE — 84100 ASSAY OF PHOSPHORUS: CPT | Performed by: INTERNAL MEDICINE

## 2017-07-13 PROCEDURE — 82043 UR ALBUMIN QUANTITATIVE: CPT | Performed by: INTERNAL MEDICINE

## 2017-07-13 NOTE — NURSING NOTE
"Chief Complaint   Patient presents with     Diabetes       Initial /72 (BP Location: Left arm, Patient Position: Chair, Cuff Size: Adult Large)  Pulse 96  Temp 97.9  F (36.6  C) (Tympanic)  Ht 5' 3\" (1.6 m)  Wt 212 lb 3 oz (96.2 kg)  SpO2 96%  BMI 37.59 kg/m2 Estimated body mass index is 37.59 kg/(m^2) as calculated from the following:    Height as of this encounter: 5' 3\" (1.6 m).    Weight as of this encounter: 212 lb 3 oz (96.2 kg).  Medication Reconciliation: complete   Tessie Perez CMA    "

## 2017-07-13 NOTE — MR AVS SNAPSHOT
After Visit Summary   7/13/2017    Debi Mo    MRN: [id number]           Patient Information     Date Of Birth          1945        Visit Information        Provider Department      7/13/2017 10:40 AM Niesha Dietrich MD [facility]        Today's Diagnoses     Type 2 diabetes mellitus with diabetic polyneuropathy, without long-term current use of insulin (H)    -  1    Coronary artery disease involving native coronary artery of native heart without angina pectoris        GARCIA (dyspnea on exertion)        Benign hypertension with CKD (chronic kidney disease) stage I           Follow-ups after your visit        Your next 10 appointments already scheduled     Oct 10, 2017 10:30 AM CDT   LAB with EA LAB   [facility] ([facility])    [address] [place] [address]  [address]  [facility] [address] [phone]           Patient must bring picture ID.  Patient should be prepared to give a urine specimen  Please do not eat 10-12 hours before your appointment if you are coming in fasting for labs on lipids, cholesterol, or glucose (sugar).  Pregnant women should follow their Care Team instructions. Water with medications is okay. Do not drink coffee or other fluids.   If you have concerns about taking  your medications, please ask at office or if scheduling via [employer], send a message by clicking on Secure Messaging, Message Your Care Team.            Oct 10, 2017 11:00 AM CDT   Office Visit with Niesha Dietrich MD   [facility] ([facility])    [address]  [address]  [facility] [id number] [phone]           Bring a current list of meds and any records pertaining to this visit.  For Physicals, please bring immunization records and any forms needing to be filled out.  Please arrive 10 minutes early to complete paperwork.              Future tests that were ordered for you today     Open Future Orders   "      Priority Expected Expires Ordered    Exercise Stress Echocardiogram Routine  7/13/2018 7/13/2017            Who to contact     If you have questions or need follow up information about today's clinic visit or your schedule please contact [facility] directly at [phone].  Normal or non-critical lab and imaging results will be communicated to you by MyChart, letter or phone within 4 business days after the clinic has received the results. If you do not hear from us within 7 days, please contact the clinic through [employer] or phone. If you have a critical or abnormal lab result, we will notify you by phone as soon as possible.  Submit refill requests through [employer] or call your pharmacy and they will forward the refill request to us. Please allow 3 business days for your refill to be completed.          Additional Information About Your Visit        [employer] Information     [employer] gives you secure access to your electronic health record. If you see a primary care provider, you can also send messages to your care team and make appointments. If you have questions, please call your primary care clinic.  If you do not have a primary care provider, please call [phone] and they will assist you.        Care EveryWhere ID     This is your Care EveryWhere ID. This could be used by other organizations to access your [facility] medical records  [id number]        Your Vitals Were     Pulse Temperature Height Pulse Oximetry BMI (Body Mass Index)       96 97.9  F (36.6  C) (Tympanic) 5' 3\" (1.6 m) 96% 37.59 kg/m2        Blood Pressure from Last 3 Encounters:   07/13/17 130/72   04/18/17 106/62   10/07/16 122/64    Weight from Last 3 Encounters:   07/13/17 212 lb 3 oz (96.2 kg)   04/18/17 214 lb 3.2 oz (97.2 kg)   10/07/16 211 lb 8 oz (95.9 kg)                 Where to get your medicines      These medications were sent to [facility] [address] [place] [facility] [place], [place] - [address]  [address] " [place] [address]     Phone:  [phone]     blood glucose monitoring lancets    blood glucose monitoring test strip         These medications were sent to [facility] - [place], [place] - [address]  [address] [address]     Phone:  [phone]     metFORMIN 500 MG tablet          Primary Care Provider Office Phone # Fax #    Niesha Dietrich [phone]       [address] [place] [address]        Equal Access to Services     [facility]: Hadii aad ku hadasho Soomaali, waaxda luqadaha, qaybta kaalmada adeegyada, waxay idiin hayaan adeeg kharachirag garcia . So [facility] [phone].    ATENCIÓN: Si habla español, tiene a grissom disposición servicios gratuitos de asistencia lingüística. [facility] [phone].    We comply with applicable federal civil rights laws and [place] laws. We do not discriminate on the basis of race, color, national origin, age, disability sex, sexual orientation or gender identity.            Thank you!     Thank you for choosing [facility]  for your care. Our goal is always to provide you with excellent care. Hearing back from our patients is one way we can continue to improve our services. Please take a few minutes to complete the written survey that you may receive in the mail after your visit with us. Thank you!             Your Updated Medication List - Protect others around you: Learn how to safely use, store and throw away your medicines at www.disposemymeds.org.          This list is accurate as of: 7/13/17 11:28 AM.  Always use your most recent med list.                   Brand Name Dispense Instructions for use Diagnosis    aspirin 81 MG tablet      Take 81 mg by mouth daily        blood glucose monitoring lancets     100 each    Use to test blood sugars 1 times daily or as directed.    Type 2 diabetes mellitus with diabetic polyneuropathy, without long-term current use of  insulin (H)       blood glucose monitoring meter device kit     1 kit    Use to test blood sugars 1 times daily or as directed.    Type 2 diabetes, controlled, with peripheral neuropathy (H)       blood glucose monitoring test strip    ONE TOUCH VERIO IQ    50 each    Use to test blood sugars 1 times daily or as directed.    Type 2 diabetes mellitus with diabetic polyneuropathy, without long-term current use of insulin (H)       CO Q 10 PO      Take 100 mg by mouth daily        cyanocobalamin 1000 MCG tablet    vitamin  B-12    100 tablet    Take 1 tablet by mouth daily.    Vitamin B12 deficiency (non anaemic)       lisinopril 40 MG tablet    PRINIVIL/ZESTRIL    90 tablet    Take 1 tablet (40 mg) by mouth daily    Microalbuminuria       metFORMIN 500 MG tablet    GLUCOPHAGE    360 tablet    Take 2 tablets (1,000 mg) by mouth 2 times daily (with meals)    Type 2 diabetes mellitus with diabetic polyneuropathy, without long-term current use of insulin (H)       potassium citrate 10 MEQ (1080 MG) CR tablet    UROCIT-K    270 tablet    Take 1 tablet (10 mEq) by mouth 3 times daily (with meals)    Calculus of kidney       simvastatin 20 MG tablet    ZOCOR    90 tablet    Take 1 tablet (20 mg) by mouth At Bedtime    Hyperlipidemia LDL goal <70       VITAMIN D3 PO      Take 4,000 Units by mouth daily.

## 2017-07-14 LAB — DEPRECATED CALCIDIOL+CALCIFEROL SERPL-MC: 60 UG/L (ref 20–75)

## 2017-07-17 DIAGNOSIS — I25.10 CORONARY ARTERY DISEASE INVOLVING NATIVE HEART WITHOUT ANGINA PECTORIS, UNSPECIFIED VESSEL OR LESION TYPE: ICD-10-CM

## 2017-07-17 DIAGNOSIS — R06.09 DOE (DYSPNEA ON EXERTION): Primary | ICD-10-CM

## 2017-07-18 NOTE — PROGRESS NOTES
Received staff message that patient has LBBB and needs Lexiscan. Reviewed chart. Last EKG with NSR; however, has had LBBB in past. Lexiscan ordered.    Breanne Isaac MD  Internal Medicine/Pediatrics

## 2017-07-20 ENCOUNTER — HOSPITAL ENCOUNTER (OUTPATIENT)
Dept: CARDIOLOGY | Facility: CLINIC | Age: 72
Discharge: HOME | End: 2017-07-20
Attending: INTERNAL MEDICINE | Admitting: INTERNAL MEDICINE
Payer: COMMERCIAL

## 2017-07-20 ENCOUNTER — HOSPITAL ENCOUNTER (OUTPATIENT)
Dept: NUCLEAR MEDICINE | Facility: CLINIC | Age: 72
Setting detail: NUCLEAR MEDICINE
Discharge: HOME | End: 2017-07-20
Attending: INTERNAL MEDICINE | Admitting: INTERNAL MEDICINE
Payer: COMMERCIAL

## 2017-07-20 ENCOUNTER — TELEPHONE (OUTPATIENT)
Dept: PEDIATRICS | Facility: CLINIC | Age: 72
End: 2017-07-20

## 2017-07-20 DIAGNOSIS — I25.119 CORONARY ARTERY DISEASE INVOLVING NATIVE HEART WITH ANGINA PECTORIS, UNSPECIFIED VESSEL OR LESION TYPE (H): Primary | ICD-10-CM

## 2017-07-20 DIAGNOSIS — R94.39 POSITIVE CARDIAC STRESS TEST: ICD-10-CM

## 2017-07-20 DIAGNOSIS — R06.09 DOE (DYSPNEA ON EXERTION): ICD-10-CM

## 2017-07-20 DIAGNOSIS — I25.10 CORONARY ARTERY DISEASE INVOLVING NATIVE HEART WITHOUT ANGINA PECTORIS, UNSPECIFIED VESSEL OR LESION TYPE: ICD-10-CM

## 2017-07-20 PROCEDURE — 93016 CV STRESS TEST SUPVJ ONLY: CPT | Performed by: INTERNAL MEDICINE

## 2017-07-20 PROCEDURE — 78452 HT MUSCLE IMAGE SPECT MULT: CPT | Mod: 26 | Performed by: INTERNAL MEDICINE

## 2017-07-20 PROCEDURE — A9502 TC99M TETROFOSMIN: HCPCS | Performed by: INTERNAL MEDICINE

## 2017-07-20 PROCEDURE — 93018 CV STRESS TEST I&R ONLY: CPT | Performed by: INTERNAL MEDICINE

## 2017-07-20 PROCEDURE — 34300033 ZZH RX 343: Performed by: INTERNAL MEDICINE

## 2017-07-20 PROCEDURE — 78452 HT MUSCLE IMAGE SPECT MULT: CPT

## 2017-07-20 RX ORDER — REGADENOSON 0.08 MG/ML
INJECTION, SOLUTION INTRAVENOUS
Status: COMPLETED
Start: 2017-07-20 | End: 2017-07-20

## 2017-07-20 RX ADMIN — REGADENOSON 0.4 MG: 0.08 INJECTION, SOLUTION INTRAVENOUS at 12:51

## 2017-07-20 RX ADMIN — TETROFOSMIN 33 MCI.: 1.38 INJECTION, POWDER, LYOPHILIZED, FOR SOLUTION INTRAVENOUS at 14:09

## 2017-07-20 RX ADMIN — TETROFOSMIN 11 MCI.: 1.38 INJECTION, POWDER, LYOPHILIZED, FOR SOLUTION INTRAVENOUS at 11:10

## 2017-07-20 NOTE — TELEPHONE ENCOUNTER
Call from Dr. WALLACE JUAREZ MD.  Please review stress tests results; they are final in epic.  He is recommending follow up with a cardiologist.    Dr. Isaac gone for the day.  Routing to Dr Moss ( alternating providers for station B)  Reema Banda RN  Message handled by Nurse Triage.

## 2017-07-20 NOTE — TELEPHONE ENCOUNTER
Please call patient. Stress test positive. Need to get set up with Cardiology for evaluation. Referral placed. Already on ASA. Should go to ER if any worsening CP or SOB prior to appointment with Cardiology.    Thanks!  Breanne Isaac MD  Internal Medicine/Pediatrics

## 2017-07-20 NOTE — TELEPHONE ENCOUNTER
Call to patient.  Advised and in understanding.   UMP: [facility] [phone]   [email or web address]    Reema Banda RN  Message handled by Nurse Triage.

## 2017-07-20 NOTE — PROGRESS NOTES
Pre-procedure:    Initial vital signs: /74, HR 82, RR 14  Allergies reviewed:    Rhythm:   Medications taken within 48 hours of procedure:    Last Caffeine:   Lung sounds: CTA, no wheezing, crackles or rtx  Health History (COPD, Asthma, etc):     Procedure: Lexiscan  Reaction/symptoms after receiving Chikis injection:   Intensity of Pain:   1. Vital Signs:/68, , RR 15  2. Vital Signs:/63, HR 88, RR 13    Reversal agent:     Post:   Resolution of symptoms?: YES  Vital signs: /68, , RR 12  Walk: NO  Comment:   Return to Radiology

## 2017-08-02 ENCOUNTER — OFFICE VISIT (OUTPATIENT)
Dept: CARDIOLOGY | Facility: CLINIC | Age: 72
End: 2017-08-02
Attending: INTERNAL MEDICINE
Payer: COMMERCIAL

## 2017-08-02 VITALS
SYSTOLIC BLOOD PRESSURE: 128 MMHG | WEIGHT: 213.4 LBS | OXYGEN SATURATION: 96 % | DIASTOLIC BLOOD PRESSURE: 66 MMHG | BODY MASS INDEX: 37.8 KG/M2 | HEART RATE: 89 BPM

## 2017-08-02 DIAGNOSIS — R94.39 EQUIVOCAL STRESS TEST: Primary | ICD-10-CM

## 2017-08-02 DIAGNOSIS — R06.09 DYSPNEA ON EXERTION: ICD-10-CM

## 2017-08-02 PROCEDURE — 99204 OFFICE O/P NEW MOD 45 MIN: CPT | Performed by: INTERNAL MEDICINE

## 2017-08-02 RX ORDER — METOPROLOL TARTRATE 50 MG
50 TABLET ORAL PRN
Qty: 2 TABLET | Refills: 0 | Status: SHIPPED | OUTPATIENT
Start: 2017-08-02 | End: 2017-10-10

## 2017-08-02 NOTE — PROGRESS NOTES
CARDIOLOGY CONSULT    REASON FOR CONSULT: f/u stress test    PRIMARY CARE PHYSICIAN:  Niesha Dietrich    HISTORY OF PRESENT ILLNESS:  72-year-old female with a history of mild coronary artery disease is seen for abnormal stress test.  She also has type 2 diabetes, dyslipidemia, and hypertension.      In 2012 she had preop EKG that showed anterior changes and stress test with anterior ischemia.  Angiogram January 2012 showed LAD with up to 20% plaque, circumflex with minimal disease, ramus was normal, RCA was normal, EF 50% with borderline hypokinesis of the mid to distal anterior and inferior wall.     Generally she's been feeling well recently.  She does some light walking and has no exertional dyspnea or chest pain.  Sometimes going up a flight of stairs she will be a little short of breath.  She had one episode where she was going uphill climbing a bluff.  This typically is a 45 minute hike, she was quite tired and had to stop after 30 minutes.  She denies any lightheadedness, dizziness, syncope, or lower extremity edema.    She wants to start mowing her lawn and doing more physical activity.  Her primary physician ordered a stress test before she startes these activities.    Lexiscan nuclear stress in July 2017 shows moderate sized defect of moderate intensity of the mid to basal anterior wall and basal anteroseptal wall, partially reversible, EF 65%.  This defect is new compared to 2013.     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     Dyspnea on exertion      Gastro-oesophageal reflux disease     in past     Heart attack (H)     possible silent MI or may be developing  cardiomyopathy     Hyperlipidemia LDL goal <100 2/11/2011     Kidney stone 2/11/2011    3  episodes (1993,2012 and presently)     Numbness and tingling     diab neuropathy feet     PONV (postoperative nausea and vomiting)     1993     Reflux      Renal cyst, left 2/11/2011     Snores      Type 2 diabetes, HbA1c goal < 7% (H) 2/11/2011       MEDICATIONS:  Current Outpatient Prescriptions   Medication     metoprolol (LOPRESSOR) 50 MG tablet     aspirin 81 MG tablet     metFORMIN (GLUCOPHAGE) 500 MG tablet     blood glucose monitoring (ONE TOUCH DELICA) lancets     blood glucose monitoring (ONE TOUCH VERIO IQ) test strip     potassium citrate (UROCIT-K) 10 MEQ (1080 MG) CR tablet     simvastatin (ZOCOR) 20 MG tablet     lisinopril (PRINIVIL/ZESTRIL) 40 MG tablet     Coenzyme Q10 (CO Q 10 PO)     blood glucose monitoring (ONETOUCH VERIO) meter device kit     Cholecalciferol (VITAMIN D3 PO)     cyanocolbalamin (VITAMIN  B-12) 1000 MCG tablet     No current facility-administered medications for this visit.        ALLERGIES:  Allergies   Allergen Reactions     Dust Mites      Seasonal Allergies        SOCIAL HISTORY:  I have reviewed this patient's social history and updated it with pertinent information if needed. Debi Mo  reports that she has never smoked. She has never used smokeless tobacco. She reports that she does not drink alcohol or use illicit drugs.    FAMILY HISTORY:  I have reviewed this patient's family history and updated it with pertinent information if needed.   Family History   Problem Relation Age of Onset     Respiratory Mother      CHF     Alzheimer Disease Mother      Dementia     HEART DISEASE Father      Heart Attack     Respiratory Father      CHF     Breast Cancer Maternal Grandmother      Breast Cancer Paternal Grandmother      Arthritis Brother      Arthritis Sister      Hypertension Son      DIABETES Daughter      Arthritis Sister      Arthritis Sister        REVIEW OF SYSTEMS:  Constitutional:  No weight loss, fever, chills, weakness or fatigue.  HEENT:  Eyes:  No visual  loss, blurred vision, double vision or yellow sclerae. No hearing loss, sneezing, congestion, runny nose or sore throat.  Skin:  No rash or itching.  Cardiovascular: per HPI  Respiratory: per HPI  GI:  No anorexia, nausea, vomiting or diarrhea. No abdominal pain or blood.  :  No dysurea, hematuria  Neurologic:  No headache, dizziness, syncope, paralysis, ataxia, numbness or tingling in the extremities. No change in bowel or bladder control.  Musculoskeletal:  No muscle, back pain, joint pain or stiffness.  Hematologic:  No anemia, bleeding or bruising.  Lymphatics:  No enlarged nodes. No history of splenectomy.  Psychiatric:  No history of depression or anxiety.  Endocrine:  No reports of sweating, cold or heat intolerance. No polyuria or polydipsia.  Allergies:  No history of asthma, hives, eczema or rhinitis.    PHYSICAL EXAM:      BP: 128/66 Pulse: 89     SpO2: 96 %      Vital Signs with Ranges  Pulse:  [89] 89  BP: (128)/(66) 128/66  SpO2:  [96 %] 96 %  213 lbs 6.4 oz    Constitutional: awake, alert, no distress  Eyes: PERRL, sclera nonicteric  ENT: trachea midline  Respiratory: Lungs clear  Cardiovascular: Regular rate and rhythm, no murmurs  GI: nondistended, nontender, bowel sounds present  Lymph/Hematologic: no lymphadenopathy  Skin: dry, no rash  Musculoskeletal: good muscle tone, strength 5/5 in upper and lower extremities  Neurologic: no focal deficits  Neuropsychiatric: appropriate affact    ASSESSMENT:  72-year-old female seen for follow-up of dyspnea and abnormal stress test.  Her symptoms are not that convincing for angina.  She has some shortness of breath when doing heavy physical activity.  However she clearly is somewhat deconditioned and symptoms do not sound very typical for angina.    Her stress test was reviewed.  There is an anteroseptal defect, however function and wall motion were normal.  There is at least a moderate suspicion that this could be breast attenuation, rather than true  ischemia.     Will do a coronary CTA as a next step.  If there are any moderately severe or severe lesions, we will then refer for coronary angiogram.    RECOMMENDATIONS:  1.  Dyspnea with mildly abnormal stress test  - Coronary CTA  - Refer for coronary angiogram if any moderately severe or severe lesions on CTA    Follow-up as needed.    Lino Bowden MD  Cardiology - [facility]  Pager:  [phone]  Text Page  August 2, 2017

## 2017-08-02 NOTE — PATIENT INSTRUCTIONS
1. Your stress test showed a small abnormality, this could be a blockage, but I suspect is might be artifact of the test    2. Will schedule a CT scan of the heart, which will get a good look at the heart arteries and determine how much plaque is in the arteries    3. If the CT scan shows a possible severe blockage, then the next step is to do a coronary angiogram    4. Take metoprolol 50mg the night before and the morning of the CT scan, this will slow your heart rate down

## 2017-08-02 NOTE — NURSING NOTE
"Chief Complaint   Patient presents with     Consult     *New pt* referred by PMD for positive stress test. PMD Dr. Dietrich (in [facility]) 7/13/17 c/o spell of GARCIA and near syncope w/ exertion. Review positive lexiscan done 7/20/17       Initial /66 (BP Location: Right arm, Patient Position: Chair, Cuff Size: Adult Regular)  Pulse 89  Wt 96.8 kg (213 lb 6.4 oz)  SpO2 96%  BMI 37.8 kg/m2 Estimated body mass index is 37.8 kg/(m^2) as calculated from the following:    Height as of 7/13/17: 1.6 m (5' 3\").    Weight as of this encounter: 96.8 kg (213 lb 6.4 oz).  Medication Reconciliation: complete  April Shirley CMA  "

## 2017-08-02 NOTE — MR AVS SNAPSHOT
After Visit Summary   8/2/2017    Debi Mo    MRN: [id number]           Patient Information     Date Of Birth          1945        Visit Information        Provider Department      8/2/2017 11:15 AM Lino Bowden MD [facility]        Today's Diagnoses     Equivocal stress test    -  1    Dyspnea on exertion          Care Instructions    1. Your stress test showed a small abnormality, this could be a blockage, but I suspect is might be artifact of the test    2. Will schedule a CT scan of the heart, which will get a good look at the heart arteries and determine how much plaque is in the arteries    3. If the CT scan shows a possible severe blockage, then the next step is to do a coronary angiogram    4. Take metoprolol 50mg the night before and the morning of the CT scan, this will slow your heart rate down          Follow-ups after your visit        Your next 10 appointments already scheduled     Oct 10, 2017 10:30 AM CDT   LAB with EA LAB   [facility] ([facility])    [address] [place] [address]  [address]  [facility] [address] [phone]           Patient must bring picture ID. Patient should be prepared to give a urine specimen  Please do not eat 10-12 hours before your appointment if you are coming in fasting for labs on lipids, cholesterol, or glucose (sugar). Pregnant women should follow their Care Team instructions. Water with medications is okay. Do not drink coffee or other fluids. If you have concerns about taking  your medications, please ask at office or if scheduling via [facility] [employer], send a message by clicking on Secure Messaging, Message Your Care Team.            Oct 10, 2017 11:00 AM CDT   Office Visit with Niesha Dietrich MD   [facility] ([facility])    [address] [place] [address]  [address]  [facility] [address] [phone]           Bring a current list of meds and any records  pertaining to this visit. For Physicals, please bring immunization records and any forms needing to be filled out. Please arrive 10 minutes early to complete paperwork.              Future tests that were ordered for you today     Open Future Orders        Priority Expected Expires Ordered    CT Angiogram coronary artery Routine 8/3/2017 8/2/2018 8/2/2017            Who to contact     If you have questions or need follow up information about today's clinic visit or your schedule please contact [facility] [place] directly at [phone].  Normal or non-critical lab and imaging results will be communicated to you by [employer], letter or phone within 4 business days after the clinic has received the results. If you do not hear from us within 7 days, please contact the clinic through [employer] or phone. If you have a critical or abnormal lab result, we will notify you by phone as soon as possible.  Submit refill requests through [employer] or call your pharmacy and they will forward the refill request to us. Please allow 3 business days for your refill to be completed.          Additional Information About Your Visit        [employer] Information     [employer] gives you secure access to your electronic health record. If you see a primary care provider, you can also send messages to your care team and make appointments. If you have questions, please call your primary care clinic.  If you do not have a primary care provider, please call [phone] and they will assist you.        Care EveryWhere ID     This is your Care EveryWhere ID. This could be used by other organizations to access your [place] medical records  [id number]        Your Vitals Were     Pulse Pulse Oximetry BMI (Body Mass Index)             89 96% 37.8 kg/m2          Blood Pressure from Last 3 Encounters:   08/02/17 128/66   07/13/17 130/72   04/18/17 106/62    Weight from Last 3 Encounters:   08/02/17 96.8 kg (213 lb 6.4 oz)   07/13/17 96.2 kg (212 lb 3  oz)   04/18/17 97.2 kg (214 lb 3.2 oz)                 Today's Medication Changes          These changes are accurate as of: 8/2/17 11:43 AM.  If you have any questions, ask your nurse or doctor.               Start taking these medicines.        Dose/Directions    metoprolol 50 MG tablet   Commonly known as:  LOPRESSOR   Used for:  Equivocal stress test, Dyspnea on exertion   Started by:  Lino Bowden MD        Dose:  50 mg   Take 1 tablet (50 mg) by mouth as needed   Quantity:  2 tablet   Refills:  0            Where to get your medicines      These medications were sent to [facility] [facility] - [place], [place] - [address]  [address], [place] [address]     Phone:  [phone]     metoprolol 50 MG tablet                Primary Care Provider Office Phone # Fax #    Niesha Dietrich [phone]       [address] [place] [address]        Equal Access to Services     BRY LOPES AH: Hadii jay ku hadasho Soomaali, waaxda luqadaha, qaybta kaalmada adeegyada, waxay riverin hayannien alba garcia . So [facility] [phone].    ATENCIÓN: Si habla español, tiene a grissom disposición servicios gratuitos de asistencia lingüística. Llame al [phone].    We comply with applicable federal civil rights laws and [place] laws. We do not discriminate on the basis of race, color, national origin, age, disability sex, sexual orientation or gender identity.            Thank you!     Thank you for choosing [facility]  for your care. Our goal is always to provide you with excellent care. Hearing back from our patients is one way we can continue to improve our services. Please take a few minutes to complete the written survey that you may receive in the mail after your visit with us. Thank you!             Your Updated Medication List - Protect others around you: Learn how to safely use, store and throw away your medicines at  www.disposemymeds.org.          This list is accurate as of: 8/2/17 11:43 AM.  Always use your most recent med list.                   Brand Name Dispense Instructions for use Diagnosis    aspirin 81 MG tablet      Take 81 mg by mouth daily        blood glucose monitoring lancets     100 each    Use to test blood sugars 1 times daily or as directed.    Type 2 diabetes mellitus with diabetic polyneuropathy, without long-term current use of insulin (H)       blood glucose monitoring meter device kit     1 kit    Use to test blood sugars 1 times daily or as directed.    Type 2 diabetes, controlled, with peripheral neuropathy (H)       blood glucose monitoring test strip    ONE TOUCH VERIO IQ    50 each    Use to test blood sugars 1 times daily or as directed.    Type 2 diabetes mellitus with diabetic polyneuropathy, without long-term current use of insulin (H)       CO Q 10 PO      Take 100 mg by mouth daily        cyanocobalamin 1000 MCG tablet    vitamin  B-12    100 tablet    Take 1 tablet by mouth daily.    Vitamin B12 deficiency (non anaemic)       lisinopril 40 MG tablet    PRINIVIL/ZESTRIL    90 tablet    Take 1 tablet (40 mg) by mouth daily    Microalbuminuria       metFORMIN 500 MG tablet    GLUCOPHAGE    360 tablet    Take 2 tablets (1,000 mg) by mouth 2 times daily (with meals)    Type 2 diabetes mellitus with diabetic polyneuropathy, without long-term current use of insulin (H)       metoprolol 50 MG tablet    LOPRESSOR    2 tablet    Take 1 tablet (50 mg) by mouth as needed    Equivocal stress test, Dyspnea on exertion       potassium citrate 10 MEQ (1080 MG) CR tablet    UROCIT-K    270 tablet    Take 1 tablet (10 mEq) by mouth 3 times daily (with meals)    Calculus of kidney       simvastatin 20 MG tablet    ZOCOR    90 tablet    Take 1 tablet (20 mg) by mouth At Bedtime    Hyperlipidemia LDL goal <70       VITAMIN D3 PO      Take 4,000 Units by mouth daily.

## 2017-08-02 NOTE — LETTER
8/2/2017    Breanne Isaac MD  [facility]  [address] [place] [address]    RE: Debi Mo       Dear Colleague,    I had the pleasure of seeing Debi Mo in the [facility].    CARDIOLOGY CONSULT    REASON FOR CONSULT: f/u stress test    PRIMARY CARE PHYSICIAN:  Niesha Dietrich    HISTORY OF PRESENT ILLNESS:  72-year-old female with a history of mild coronary artery disease is seen for abnormal stress test.  She also has type 2 diabetes, dyslipidemia, and hypertension.      In 2012 she had preop EKG that showed anterior changes and stress test with anterior ischemia.  Angiogram January 2012 showed LAD with up to 20% plaque, circumflex with minimal disease, ramus was normal, RCA was normal, EF 50% with borderline hypokinesis of the mid to distal anterior and inferior wall.     Generally she's been feeling well recently.  She does some light walking and has no exertional dyspnea or chest pain.  Sometimes going up a flight of stairs she will be a little short of breath.  She had one episode where she was going uphill climbing a bluff.  This typically is a 45 minute hike, she was quite tired and had to stop after 30 minutes.  She denies any lightheadedness, dizziness, syncope, or lower extremity edema.    She wants to start mowing her lawn and doing more physical activity.  Her primary physician ordered a stress test before she startes these activities.    Lexiscan nuclear stress in July 2017 shows moderate sized defect of moderate intensity of the mid to basal anterior wall and basal anteroseptal wall, partially reversible, EF 65%.  This defect is new compared to 2013.     PAST MEDICAL HISTORY:  Past Medical History:   Diagnosis Date     Anemia     prior to hysterectomy     Arthritis      Chronic pain     hands, not on pain meds     COPD (chronic obstructive pulmonary disease) (H)     Scarring on lungs since childhood ? 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     Dyspnea on exertion      Gastro-oesophageal reflux disease     in past     Heart attack (H)     possible silent MI or may be developing  cardiomyopathy     Hyperlipidemia LDL goal <100 2/11/2011     Kidney stone 2/11/2011    3 episodes (1993,2012 and presently)     Numbness and tingling     diab neuropathy feet     PONV (postoperative nausea and vomiting)     1993     Reflux      Renal cyst, left 2/11/2011     Snores      Type 2 diabetes, HbA1c goal < 7% (H) 2/11/2011       MEDICATIONS:  Current Outpatient Prescriptions   Medication     metoprolol (LOPRESSOR) 50 MG tablet     aspirin 81 MG tablet     metFORMIN (GLUCOPHAGE) 500 MG tablet     blood glucose monitoring (ONE TOUCH DELICA) lancets     blood glucose monitoring (ONE TOUCH VERIO IQ) test strip     potassium citrate (UROCIT-K) 10 MEQ (1080 MG) CR tablet     simvastatin (ZOCOR) 20 MG tablet     lisinopril (PRINIVIL/ZESTRIL) 40 MG tablet     Coenzyme Q10 (CO Q 10 PO)     blood glucose monitoring (ONETOUCH VERIO) meter device kit     Cholecalciferol (VITAMIN D3 PO)     cyanocolbalamin (VITAMIN  B-12) 1000 MCG tablet     No current facility-administered medications for this visit.        ALLERGIES:  Allergies   Allergen Reactions     Dust Mites      Seasonal Allergies        SOCIAL HISTORY:  I have reviewed this patient's social history and updated it with pertinent information if needed. Debi L Bradford  reports that she has never smoked. She has never used smokeless tobacco. She reports that she does not drink alcohol or use illicit drugs.    FAMILY HISTORY:  I have reviewed this patient's family history and updated it with pertinent information if needed.   Family History   Problem Relation Age of Onset     Respiratory Mother      CHF     Alzheimer Disease Mother      Dementia     HEART DISEASE Father      Heart Attack     Respiratory Father      CHF     Breast Cancer Maternal Grandmother      Breast Cancer Paternal Grandmother       Arthritis Brother      Arthritis Sister      Hypertension Son      DIABETES Daughter      Arthritis Sister      Arthritis Sister        REVIEW OF SYSTEMS:  Constitutional:  No weight loss, fever, chills, weakness or fatigue.  HEENT:  Eyes:  No visual loss, blurred vision, double vision or yellow sclerae. No hearing loss, sneezing, congestion, runny nose or sore throat.  Skin:  No rash or itching.  Cardiovascular: per HPI  Respiratory: per HPI  GI:  No anorexia, nausea, vomiting or diarrhea. No abdominal pain or blood.  :  No dysurea, hematuria  Neurologic:  No headache, dizziness, syncope, paralysis, ataxia, numbness or tingling in the extremities. No change in bowel or bladder control.  Musculoskeletal:  No muscle, back pain, joint pain or stiffness.  Hematologic:  No anemia, bleeding or bruising.  Lymphatics:  No enlarged nodes. No history of splenectomy.  Psychiatric:  No history of depression or anxiety.  Endocrine:  No reports of sweating, cold or heat intolerance. No polyuria or polydipsia.  Allergies:  No history of asthma, hives, eczema or rhinitis.    PHYSICAL EXAM:      BP: 128/66 Pulse: 89     SpO2: 96 %      Vital Signs with Ranges  Pulse:  [89] 89  BP: (128)/(66) 128/66  SpO2:  [96 %] 96 %  213 lbs 6.4 oz    Constitutional: awake, alert, no distress  Eyes: PERRL, sclera nonicteric  ENT: trachea midline  Respiratory: Lungs clear  Cardiovascular: Regular rate and rhythm, no murmurs  GI: nondistended, nontender, bowel sounds present  Lymph/Hematologic: no lymphadenopathy  Skin: dry, no rash  Musculoskeletal: good muscle tone, strength 5/5 in upper and lower extremities  Neurologic: no focal deficits  Neuropsychiatric: appropriate affact    ASSESSMENT:  72-year-old female seen for follow-up of dyspnea and abnormal stress test.  Her symptoms are not that convincing for angina.  She has some shortness of breath when doing heavy physical activity.  However she clearly is somewhat deconditioned and symptoms  do not sound very typical for angina.    Her stress test was reviewed.  There is an anteroseptal defect, however function and wall motion were normal.  There is at least a moderate suspicion that this could be breast attenuation, rather than true ischemia.     Will do a coronary CTA as a next step.  If there are any moderately severe or severe lesions, we will then refer for coronary angiogram.    RECOMMENDATIONS:  1.  Dyspnea with mildly abnormal stress test  - Coronary CTA  - Refer for coronary angiogram if any moderately severe or severe lesions on CTA    Follow-up as needed.    Lino Bowden MD  Cardiology - [facility]  Pager:  [phone]  Text Page  August 2, 2017              Thank you for allowing me to participate in the care of your patient.    Sincerely,     Lino Bowden MD     [facility]    cc:   Niesha Dietrich MD  [facility]   [address] [place] [address]

## 2017-08-08 ENCOUNTER — HOSPITAL ENCOUNTER (EMERGENCY)
Facility: CLINIC | Age: 72
Discharge: HOME | End: 2017-08-08
Attending: EMERGENCY MEDICINE | Admitting: EMERGENCY MEDICINE
Payer: COMMERCIAL

## 2017-08-08 VITALS
SYSTOLIC BLOOD PRESSURE: 132 MMHG | TEMPERATURE: 98.8 F | RESPIRATION RATE: 16 BRPM | WEIGHT: 215 LBS | HEART RATE: 91 BPM | HEIGHT: 63 IN | BODY MASS INDEX: 38.09 KG/M2 | DIASTOLIC BLOOD PRESSURE: 80 MMHG | OXYGEN SATURATION: 89 %

## 2017-08-08 DIAGNOSIS — N20.1 CALCULUS OF URETER: ICD-10-CM

## 2017-08-08 LAB
ALBUMIN UR-MCNC: 30 MG/DL
APPEARANCE UR: ABNORMAL
BACTERIA #/AREA URNS HPF: ABNORMAL /HPF
BILIRUB UR QL STRIP: NEGATIVE
CAOX CRY #/AREA URNS HPF: ABNORMAL /HPF
COLOR UR AUTO: YELLOW
GLUCOSE UR STRIP-MCNC: >499 MG/DL
HGB UR QL STRIP: ABNORMAL
INTERPRETATION ECG - MUSE: NORMAL
KETONES UR STRIP-MCNC: 20 MG/DL
LEUKOCYTE ESTERASE UR QL STRIP: NEGATIVE
MUCOUS THREADS #/AREA URNS LPF: PRESENT /LPF
NITRATE UR QL: NEGATIVE
PH UR STRIP: 5 PH (ref 5–7)
RBC #/AREA URNS AUTO: 18 /HPF (ref 0–2)
SP GR UR STRIP: 1.02 (ref 1–1.03)
SQUAMOUS #/AREA URNS AUTO: 1 /HPF (ref 0–1)
TRANS CELLS #/AREA URNS HPF: <1 /HPF (ref 0–1)
URN SPEC COLLECT METH UR: ABNORMAL
UROBILINOGEN UR STRIP-MCNC: 0 MG/DL (ref 0–2)
WBC #/AREA URNS AUTO: 3 /HPF (ref 0–2)

## 2017-08-08 PROCEDURE — 99284 EMERGENCY DEPT VISIT MOD MDM: CPT

## 2017-08-08 PROCEDURE — 93005 ELECTROCARDIOGRAM TRACING: CPT

## 2017-08-08 PROCEDURE — 81001 URINALYSIS AUTO W/SCOPE: CPT | Performed by: EMERGENCY MEDICINE

## 2017-08-08 RX ORDER — ONDANSETRON 8 MG/1
8 TABLET, ORALLY DISINTEGRATING ORAL EVERY 6 HOURS PRN
Qty: 10 TABLET | Refills: 0 | Status: SHIPPED | OUTPATIENT
Start: 2017-08-08 | End: 2017-08-11

## 2017-08-08 RX ORDER — TAMSULOSIN HYDROCHLORIDE 0.4 MG/1
0.4 CAPSULE ORAL DAILY
Qty: 10 CAPSULE | Refills: 0 | Status: SHIPPED | OUTPATIENT
Start: 2017-08-08 | End: 2017-08-18

## 2017-08-08 RX ORDER — HYDROCODONE BITARTRATE AND ACETAMINOPHEN 5; 325 MG/1; MG/1
1-2 TABLET ORAL EVERY 4 HOURS PRN
Qty: 15 TABLET | Refills: 0 | Status: SHIPPED | OUTPATIENT
Start: 2017-08-08 | End: 2017-08-11

## 2017-08-08 ASSESSMENT — ENCOUNTER SYMPTOMS
VOMITING: 1
DIARRHEA: 0
SHORTNESS OF BREATH: 0
ABDOMINAL PAIN: 0
FLANK PAIN: 1

## 2017-08-08 NOTE — ED AVS SNAPSHOT
[facility] Emergency Department    [address] [place] [address]    Phone:  [phone]    Fax:  [phone]                                       Debi Mo   MRN: [id number]    Department:  [facility] Emergency Department   Date of Visit:  8/8/2017           Patient Information     Date Of Birth          1945        Your diagnoses for this visit were:     Calculus of ureter        You were seen by Jerome Gamboa MD.      Follow-up Information     Follow up with Lester Orona MD. Schedule an appointment as soon as possible for a visit today.    Specialty:  Urology    Contact information:    [facility] UROLOGY  [address]  [facility] [id number] [phone]          Discharge Instructions         Treating Kidney Stones: Expectant Therapy  Most kidney stones are about the size of a grape seed. Stones of this size are small enough to pass naturally. Once it is passed, a stone can be analyzed. This wait-and-see approach is called expectant therapy. Small stones can often be passed with expectant therapy. If pain is a problem, ask your healthcare provider about pain medicines. Then follow his or her directions on how much water to drink. Drinking more water creates more urine to flush out your stone. Also be sure to strain your urine. Take any stones you pass to your provider for analysis.    Drink lots of water  Drinking lots of water may help your stone pass. Water also dilutes the chemicals in your urine. This reduces your risk of forming new stones. You may be told to drink 8, 12-ounce glasses of water a day. Avoid liquids that dehydrate you, such as those containing caffeine or alcohol.  Strain your urine  Straining your urine lets you collect your stone for analysis. Use the strainer each time you urinate. Strain your urine for as long as your healthcare provider suggests. Watch for brown, tan, gold, or black specks or tiny marquita.  These may be kidney stones.  Take your medicine  Your healthcare provider may give you medicine that makes it more likely for you to pass the kidney stone.   Follow up with your healthcare provider  Follow up by taking any stones you find to your provider for analysis. The type of stone you have determines your diet and prevention program. You may need more tests in the future. These tests will ensure that new stones are not forming.  Date Last Reviewed: 1/1/2017 2000-2017 [employer]. [address] [place] [address], [place] [address]. All rights reserved. This information is not intended as a substitute for professional medical care. Always follow your healthcare professional's instructions.          Kidney Stone, Passed    A kidney stone (nephrolithiasis) starts as tiny crystals that form inside the kidney where urine is made. Most kidney stones enlarge to about 1/8 to 1/4 inch in size before leaving the kidney and moving toward the bladder. The sharp, cramping pain and nausea/vomiting you had was the stone moving through the ureter (the narrow tube joining the kidney to the bladder). Once the stone reaches your bladder, the pain stops. Pain may start again as the stone passes through the bladder and out through the urethra. There are 4 types of kidney stones. Eighty percent are calcium stones--mostly calcium oxalate but also some with calcium phosphate. The other 3 types include uric acid stones, struvite stones (from a preceding infection), and rarely, cystine stones.  Home care  The following guidelines will help you care for yourself at home:    Drink plenty of fluids. This increases urine flow and reduces the chance that a new stone will form. Healthy adults (no heart/liver/kidney disease) who have had a kidney stone should drink 12, 8-ounce glasses of fluids per day. Most of this should be water. The goal is to produce 1.5 to 2 quarts of almost colorless urine per 24 hours.    Unless another NSAID  (non-steroidal anti-inflammatory drug) was given, you may take ibuprofen or naproxen in addition to any narcotic pain medicine your healthcare provider prescribes. If you have chronic liver or kidney disease or ever had a stomach ulcer or GI bleeding, talk with your healthcare provider before using these medicines.    Collecting the stone: If you were given a strainer, urinate into a jar then pour the urine through the strainer and into the toilet. Keep doing this for 24 hours after your pain stops. Save any stone that you find in the strainer and bring it to your healthcare provider for analysis. If you do not collect a stone, a 24-hour urine specimen can be done at a later time by your healthcare provider to figure out the cause of your stone.  Prevention  Each year, there is a chance that a new stone will form (50% chance over the next 5 to 7 years). The risk is highest if you have a family history of kidney stones or have certain chronic illnesses such as hypertension, obesity, or diabetes. However, there are lifestyle and dietary changes that you can make to reduce the risk of getting another kidney stone.  Most kidney stones are made of calcium. The following advice can help you prevent calcium stones. If you don t know the type of stone you have, follow this advice until the healthcare provider determines the cause of your stone.  Things that help:    The most important thing you can do is to drink plenty of fluids each day, as described above.    Certain foods, such as wheat, rice, rye, barley, and beans, contain phytate. Phytate is a compound that may lower the risk of recurrence of any type of stone.    Eat more fruits and vegetables, especially those high in potassium.    Eat foods high in natural citrate like fruit and fruit juices (using low sugar).    Low calcium contributes to calcium type kidney stones. Eat a normal calcium diet and talk with your healthcare provider if you are taking calcium  supplements. It may be detrimental to lower your calcium intake. New research shows that eating calcium-rich and oxalate-rich foods together lowers your risk of stones. This is because eating these foods together binds the minerals in the stomach and intestines before they can reach the kidneys.    Limit salt intake to 2 grams (1 teaspoon) per day. Use limited amounts when cooking, and don t add salt at the table. Processed and canned foods are usually high in salt.    Spinach, rhubarb, peanuts, cashews, almonds, grapefruit, and grapefruit juice are all high oxalate foods and should be reduced, or eaten with calcium-rich foods. These foods include dairy, dark leafy greens, soy products, calcium-enriched foods, and others.    Reduce the amount of animal meat and high protein foods in your diet. This may lower your risk of uric acid stones.    Avoid excess sugar (sucrose) and fructose (sweetener in many soft drinks) in your diet.    If you take vitamin C as a supplement, don't take more than 1,000 milligrams (mg) per day.    A dietitian or your healthcare provider can give you ideas on how to change your diet to prevent more kidney stones.  Follow-up care  Follow up with your healthcare provider, or as advised. Even if you don't collect the kidney stone, it is possible to analyze a 24-hour urine collection for the cause of this stone. Discuss this with your healthcare provider.  If you had an X-ray, CT scan, or other diagnostic test, you will be told of any findings that may affect your care.  Call 911  Call 911 if you have any of these:    Weakness, dizziness, or fainting  When to seek medical advice  Call your healthcare provider if any of the these occur:    Severe pain that returns and not relieved by pain medicines    Repeated vomiting or unable to keep down fluids    Fever of 100.4 F (38 C) or higher, or as directed by your healthcare provider    Blood clots in urine    Foul smelling or cloudy urine    Unable to  pass urine for 8 hours or increasing bladder pressure  Date Last Reviewed: 10/1/2016    5013-3831 [employer]. [address] [place] [address], [place] [address]. All rights reserved. This information is not intended as a substitute for professional medical care. Always follow your healthcare professional's instructions.          Future Appointments        Provider Department Dept Phone Center    8/10/2017 2:00 PM [facility] CT ROOM 1 [facility] [phone] [facility]    10/10/2017 10:30 AM [facility] [phone] EAG    10/10/2017 11:00 AM Niesha Dietrich MD [facility] [phone] EAG      24 Hour Appointment Hotline       To make an appointment at any [facility], call [phone] ([phone]). If you don't have a family doctor or clinic, we will help you find one. [facility] are conveniently located to serve the needs of you and your family.             Review of your medicines      START taking        Dose / Directions Last dose taken    HYDROcodone-acetaminophen 5-325 MG per tablet   Commonly known as:  NORCO   Dose:  1-2 tablet   Quantity:  15 tablet        Take 1-2 tablets by mouth every 4 hours as needed for moderate to severe pain   Refills:  0        ondansetron 8 MG ODT tab   Commonly known as:  ZOFRAN ODT   Dose:  8 mg   Quantity:  10 tablet        Take 1 tablet (8 mg) by mouth every 6 hours as needed   Refills:  0        tamsulosin 0.4 MG capsule   Commonly known as:  FLOMAX   Dose:  0.4 mg   Quantity:  10 capsule        Take 1 capsule (0.4 mg) by mouth daily for 10 doses   Refills:  0          Our records show that you are taking the medicines listed below. If these are incorrect, please call your family doctor or clinic.        Dose / Directions Last dose taken    aspirin 81 MG tablet   Dose:  81 mg        Take 81 mg by mouth daily   Refills:  0        blood glucose monitoring lancets    Quantity:  100 each        Use to test blood sugars 1 times daily or as directed.   Refills:  11        blood glucose monitoring meter device kit   Quantity:  1 kit        Use to test blood sugars 1 times daily or as directed.   Refills:  0        blood glucose monitoring test strip   Commonly known as:  ONE TOUCH VERIO IQ   Quantity:  50 each        Use to test blood sugars 1 times daily or as directed.   Refills:  11        CO Q 10 PO   Dose:  100 mg        Take 100 mg by mouth daily   Refills:  0        cyanocobalamin 1000 MCG tablet   Commonly known as:  vitamin  B-12   Dose:  1000 mcg   Quantity:  100 tablet        Take 1 tablet by mouth daily.   Refills:  3        lisinopril 40 MG tablet   Commonly known as:  PRINIVIL/ZESTRIL   Dose:  40 mg   Quantity:  90 tablet        Take 1 tablet (40 mg) by mouth daily   Refills:  2        metFORMIN 500 MG tablet   Commonly known as:  GLUCOPHAGE   Dose:  1000 mg   Quantity:  360 tablet        Take 2 tablets (1,000 mg) by mouth 2 times daily (with meals)   Refills:  0        metoprolol 50 MG tablet   Commonly known as:  LOPRESSOR   Dose:  50 mg   Quantity:  2 tablet        Take 1 tablet (50 mg) by mouth as needed   Refills:  0        potassium citrate 10 MEQ (1080 MG) CR tablet   Commonly known as:  UROCIT-K   Dose:  10 mEq   Quantity:  270 tablet        Take 1 tablet (10 mEq) by mouth 3 times daily (with meals)   Refills:  1        simvastatin 20 MG tablet   Commonly known as:  ZOCOR   Dose:  20 mg   Quantity:  90 tablet        Take 1 tablet (20 mg) by mouth At Bedtime   Refills:  2        VITAMIN D3 PO   Dose:  4000 Units        Take 4,000 Units by mouth daily.   Refills:  0                Prescriptions were sent or printed at these locations (3 Prescriptions)                   Other Prescriptions                Printed at Department/Unit printer (3 of 3)         HYDROcodone-acetaminophen (NORCO) 5-325 MG per tablet               tamsulosin (FLOMAX) 0.4 MG capsule                ondansetron (ZOFRAN ODT) 8 MG ODT tab                Procedures and tests performed during your visit     EKG 12-lead, tracing only    UA with Microscopic      Orders Needing Specimen Collection     None      Pending Results     No orders found from 8/6/2017 to 8/9/2017.            Pending Culture Results     No orders found from 8/6/2017 to 8/9/2017.            Pending Results Instructions     If you had any lab results that were not finalized at the time of your Discharge, you can call the ED Lab Result RN at [phone]. You will be contacted by this team for any positive Lab results or changes in treatment. The nurses are available 7 days a week from 10A to 6:30P.  You can leave a message 24 hours per day and they will return your call.        Test Results From Your Hospital Stay        8/8/2017  8:20 AM      Component Results     Component Value Ref Range & Units Status    Color Urine Yellow  Final    Appearance Urine Slightly Cloudy  Final    Glucose Urine >499 (A) NEG mg/dL Final    Bilirubin Urine Negative NEG Final    Ketones Urine 20 (A) NEG mg/dL Final    Specific Gravity Urine 1.021 1.003 - 1.035 Final    Blood Urine Large (A) NEG Final    pH Urine 5.0 5.0 - 7.0 pH Final    Protein Albumin Urine 30 (A) NEG mg/dL Final    Urobilinogen mg/dL 0.0 0.0 - 2.0 mg/dL Final    Nitrite Urine Negative NEG Final    Leukocyte Esterase Urine Negative NEG Final    Source Midstream Urine  Final    WBC Urine 3 (H) 0 - 2 /HPF Final    RBC Urine 18 (H) 0 - 2 /HPF Final    Bacteria Urine Few (A) NEG /HPF Final    Squamous Epithelial /HPF Urine 1 0 - 1 /HPF Final    Transitional Epi <1 0 - 1 /HPF Final    Mucous Urine Present (A) NEG /LPF Final    Calcium Oxalate Few (A) NEG /HPF Final                Clinical Quality Measure: Blood Pressure Screening     Your blood pressure was checked while you were in the emergency department today. The last reading we obtained was  BP: 132/65 . Please read the guidelines below  about what these numbers mean and what you should do about them.  If your systolic blood pressure (the top number) is less than 120 and your diastolic blood pressure (the bottom number) is less than 80, then your blood pressure is normal. There is nothing more that you need to do about it.  If your systolic blood pressure (the top number) is 120-139 or your diastolic blood pressure (the bottom number) is 80-89, your blood pressure may be higher than it should be. You should have your blood pressure rechecked within a year by a primary care provider.  If your systolic blood pressure (the top number) is 140 or greater or your diastolic blood pressure (the bottom number) is 90 or greater, you may have high blood pressure. High blood pressure is treatable, but if left untreated over time it can put you at risk for heart attack, stroke, or kidney failure. You should have your blood pressure rechecked by a primary care provider within the next 4 weeks.  If your provider in the emergency department today gave you specific instructions to follow-up with your doctor or provider even sooner than that, you should follow that instruction and not wait for up to 4 weeks for your follow-up visit.        Thank you for choosing [facility]       Thank you for choosing [facility] for your care. Our goal is always to provide you with excellent care. Hearing back from our patients is one way we can continue to improve our services. Please take a few minutes to complete the written survey that you may receive in the mail after you visit with us. Thank you!        [facility] Information     [facility] gives you secure access to your electronic health record. If you see a primary care provider, you can also send messages to your care team and make appointments. If you have questions, please call your primary care clinic.  If you do not have a primary care provider, please call [phone] and they will assist you.        Care EveryWhere ID     This  is your Care EveryWhere ID. This could be used by other organizations to access your [facility] medical records  [id number]        Equal Access to Services     SLADE LOPES : Beryl Wiseman, romy chang, yon breen, laura talley. So [facility] [phone].    ATENCIÓN: Si habla español, tiene a grissom disposición servicios gratuitos de asistencia lingüística. Llame al [phone].    We comply with applicable federal civil rights laws and [place] laws. We do not discriminate on the basis of race, color, national origin, age, disability sex, sexual orientation or gender identity.            After Visit Summary       This is your record. Keep this with you and show to your community pharmacist(s) and doctor(s) at your next visit.

## 2017-08-08 NOTE — DISCHARGE INSTRUCTIONS
Treating Kidney Stones: Expectant Therapy  Most kidney stones are about the size of a grape seed. Stones of this size are small enough to pass naturally. Once it is passed, a stone can be analyzed. This wait-and-see approach is called expectant therapy. Small stones can often be passed with expectant therapy. If pain is a problem, ask your healthcare provider about pain medicines. Then follow his or her directions on how much water to drink. Drinking more water creates more urine to flush out your stone. Also be sure to strain your urine. Take any stones you pass to your provider for analysis.    Drink lots of water  Drinking lots of water may help your stone pass. Water also dilutes the chemicals in your urine. This reduces your risk of forming new stones. You may be told to drink 8, 12-ounce glasses of water a day. Avoid liquids that dehydrate you, such as those containing caffeine or alcohol.  Strain your urine  Straining your urine lets you collect your stone for analysis. Use the strainer each time you urinate. Strain your urine for as long as your healthcare provider suggests. Watch for brown, tan, gold, or black specks or tiny marquita. These may be kidney stones.  Take your medicine  Your healthcare provider may give you medicine that makes it more likely for you to pass the kidney stone.   Follow up with your healthcare provider  Follow up by taking any stones you find to your provider for analysis. The type of stone you have determines your diet and prevention program. You may need more tests in the future. These tests will ensure that new stones are not forming.  Date Last Reviewed: 1/1/2017 2000-2017 [employer]. [address] [place] [address], [place] [address]. All rights reserved. This information is not intended as a substitute for professional medical care. Always follow your healthcare professional's instructions.          Kidney Stone, Passed    A kidney stone (nephrolithiasis) starts as  tiny crystals that form inside the kidney where urine is made. Most kidney stones enlarge to about 1/8 to 1/4 inch in size before leaving the kidney and moving toward the bladder. The sharp, cramping pain and nausea/vomiting you had was the stone moving through the ureter (the narrow tube joining the kidney to the bladder). Once the stone reaches your bladder, the pain stops. Pain may start again as the stone passes through the bladder and out through the urethra. There are 4 types of kidney stones. Eighty percent are calcium stones--mostly calcium oxalate but also some with calcium phosphate. The other 3 types include uric acid stones, struvite stones (from a preceding infection), and rarely, cystine stones.  Home care  The following guidelines will help you care for yourself at home:    Drink plenty of fluids. This increases urine flow and reduces the chance that a new stone will form. Healthy adults (no heart/liver/kidney disease) who have had a kidney stone should drink 12, 8-ounce glasses of fluids per day. Most of this should be water. The goal is to produce 1.5 to 2 quarts of almost colorless urine per 24 hours.    Unless another NSAID (non-steroidal anti-inflammatory drug) was given, you may take ibuprofen or naproxen in addition to any narcotic pain medicine your healthcare provider prescribes. If you have chronic liver or kidney disease or ever had a stomach ulcer or GI bleeding, talk with your healthcare provider before using these medicines.    Collecting the stone: If you were given a strainer, urinate into a jar then pour the urine through the strainer and into the toilet. Keep doing this for 24 hours after your pain stops. Save any stone that you find in the strainer and bring it to your healthcare provider for analysis. If you do not collect a stone, a 24-hour urine specimen can be done at a later time by your healthcare provider to figure out the cause of your stone.  Prevention  Each year, there is  a chance that a new stone will form (50% chance over the next 5 to 7 years). The risk is highest if you have a family history of kidney stones or have certain chronic illnesses such as hypertension, obesity, or diabetes. However, there are lifestyle and dietary changes that you can make to reduce the risk of getting another kidney stone.  Most kidney stones are made of calcium. The following advice can help you prevent calcium stones. If you don t know the type of stone you have, follow this advice until the healthcare provider determines the cause of your stone.  Things that help:    The most important thing you can do is to drink plenty of fluids each day, as described above.    Certain foods, such as wheat, rice, rye, barley, and beans, contain phytate. Phytate is a compound that may lower the risk of recurrence of any type of stone.    Eat more fruits and vegetables, especially those high in potassium.    Eat foods high in natural citrate like fruit and fruit juices (using low sugar).    Low calcium contributes to calcium type kidney stones. Eat a normal calcium diet and talk with your healthcare provider if you are taking calcium supplements. It may be detrimental to lower your calcium intake. New research shows that eating calcium-rich and oxalate-rich foods together lowers your risk of stones. This is because eating these foods together binds the minerals in the stomach and intestines before they can reach the kidneys.    Limit salt intake to 2 grams (1 teaspoon) per day. Use limited amounts when cooking, and don t add salt at the table. Processed and canned foods are usually high in salt.    Spinach, rhubarb, peanuts, cashews, almonds, grapefruit, and grapefruit juice are all high oxalate foods and should be reduced, or eaten with calcium-rich foods. These foods include dairy, dark leafy greens, soy products, calcium-enriched foods, and others.    Reduce the amount of animal meat and high protein foods in  your diet. This may lower your risk of uric acid stones.    Avoid excess sugar (sucrose) and fructose (sweetener in many soft drinks) in your diet.    If you take vitamin C as a supplement, don't take more than 1,000 milligrams (mg) per day.    A dietitian or your healthcare provider can give you ideas on how to change your diet to prevent more kidney stones.  Follow-up care  Follow up with your healthcare provider, or as advised. Even if you don't collect the kidney stone, it is possible to analyze a 24-hour urine collection for the cause of this stone. Discuss this with your healthcare provider.  If you had an X-ray, CT scan, or other diagnostic test, you will be told of any findings that may affect your care.  Call 911  Call 911 if you have any of these:    Weakness, dizziness, or fainting  When to seek medical advice  Call your healthcare provider if any of the these occur:    Severe pain that returns and not relieved by pain medicines    Repeated vomiting or unable to keep down fluids    Fever of 100.4 F (38 C) or higher, or as directed by your healthcare provider    Blood clots in urine    Foul smelling or cloudy urine    Unable to pass urine for 8 hours or increasing bladder pressure  Date Last Reviewed: 10/1/2016    4773-2876 [employer]. [address] [place] [address], [place] [address]. All rights reserved. This information is not intended as a substitute for professional medical care. Always follow your healthcare professional's instructions.

## 2017-08-08 NOTE — ED AVS SNAPSHOT
[facility] Emergency Department    [address]    [facility] [address]    Phone:  [phone]    Fax:  [phone]                                       Debi Mo   MRN: [id number]    Department:  [facility] Emergency Department   Date of Visit:  8/8/2017           After Visit Summary Signature Page     I have received my discharge instructions, and my questions have been answered. I have discussed any challenges I see with this plan with the nurse or doctor.    ..........................................................................................................................................  Patient/Patient Representative Signature      ..........................................................................................................................................  Patient Representative Print Name and Relationship to Patient    ..................................................               ................................................  Date                                            Time    ..........................................................................................................................................  Reviewed by Signature/Title    ...................................................              ..............................................  Date                                                            Time

## 2017-08-08 NOTE — ED PROVIDER NOTES
History     Chief Complaint:  Flank Pain      HPI   Debi Mo is a 72 year old female with a history of Kidney Stones who presents with flank pain. The patient reports that she has been experiencing right flank pain since yesterday evening and likens her current symptoms to her past episodes of kidney stones. The patient reports that her pain has improved this morning and she is currently pain free. She states that she experienced associated vomiting last night but notes that is baseline for when she has kidney stones. The patient denies associated chest pain, diarrhea, rash, other abdominal pain or shortness or breath. Of note, the patient had kidney stones removed by a Urologist 11 months ago and also in 1993. Also of note, the patient is scheduled for a CT scan on Thursday at [facility] to clear her before she starts an exercise program.    Allergies:  Dust Mites  Seasonal Allergies    Medications:    Lopressor  Aspirin  Glucophage  Blood Glucose Monitoring  Urocit-K  Zocor  Prinivil  Coenzyme Q10  Vitamin D3  Vitamin B-12    Past Medical History:    HLD  Morbid obesity due to excess calories  Type 2 diabetes with diabetic polyneuropathy without long term current use of insulin  Coronary artery disease involving native coronary artery without angina pectoris  Microalbuminuria  History of Nephrolithiasis  Benign HTN with CKD  Vitamin B12 deficiency without anemia  Vitamin D deficiency  LBBB  Serum calcium elevated  Peripheral neuropathy  High triglycerides  Seasonal allergic rhinitis  Anemia  Arthritis  Chronic pain  COPD  Dyspnea on exertion  Gastro-esophageal reflux disease  Heart attack  Kidney Stone  Numbness and tingling  PONV (Post-operative nausea and vomiting)  Reflux  Renal cyst, left  Snores    Past Surgical History:    Cardiac Surgery- Angiogram  Cholecystectomy  Combines cystoscopy, retrogrades, ureteroscopy, laser holmium lithotripsy ureter(s) insert  Cytoscopy, retrogrades,  "Combined  Cytoscopy, Ureteroscopy, combined  Extracorporeal shock with wave Lithotripsy x4  Gall bladder surgery  Hysterectomy vaginal  Laser holmium lithotripsy ureter(s), insert stent, combined  Shoulder surgery- left rotator cuff    Family History:    Respiratory- CHF  Alzheimer Disease  Dementia  Heart Attack  Breast cancer  Arthritis  HTN  Diabetes    Social History:  Relationship status:   Tobacco use: Neg  Alcohol use: Neg  The patient presents with her son.     Review of Systems   Respiratory: Negative for shortness of breath.    Cardiovascular: Negative for chest pain.   Gastrointestinal: Positive for vomiting (Resolved). Negative for abdominal pain and diarrhea.   Genitourinary: Positive for flank pain (Resolved).   Skin: Negative for rash.   All other systems reviewed and are negative.    Physical Exam     Patient Vitals for the past 24 hrs:   BP Temp Temp src Pulse Heart Rate Resp SpO2 Height Weight   08/08/17 0800 132/65 - - - - - 94 % - -   08/08/17 0745 137/71 - - - - - 94 % - -   08/08/17 0730 154/81 98.8  F (37.1  C) Oral 91 91 16 96 % 1.6 m (5' 3\") 97.5 kg (215 lb)     Physical Exam  Constitutional: Patient is well appearing. No distress.  Head: Atraumatic.   Mouth/Throat: Oropharynx is clear and moist. No oropharyngeal exudate.  Eyes: Conjunctivae and EOM are normal. No scleral icterus.  Neck: Normal range of motion. Neck supple.   Cardiovascular: Normal rate, regular rhythm, normal heart sounds and intact distal pulses.   Pulmonary/Chest: Breath sounds normal. No respiratory distress.  Abdominal: Soft. Bowel sounds are normal. No distension. No tenderness. No rebound or guarding. Kidney punch negative.  Musculoskeletal: Normal range of motion. No edema or tenderness.   Neurological: Alert and orientated to person, place, and time. No observable focal neuro deficit  Skin: Warm and dry. No rash noted. Not diaphoretic.     Emergency Department Course   ECG @ 0822  Indication: Flank " Pain  Rate 90 bpm.   NY interval 160 ms.   QRS duration 84 ms.   QT/QTc 362/442 ms.   P-R-T axes -12.  Notes: Normal Sinus Rhythm, Normal ECG.  Time read 0825     Laboratory:  UA: Slightly Cloudy, yellow urine: Urine GLC >499 (A), Urineketon 20 (A), Urine Blood Large (A), Protein Albumin Urine 30 (A), WBC/HPF 3 (H), RBC/HPF 18 (H), Bacteria Few (A), Mucous Urine Present (A), Calcium Oxalate Few (A) o/w WNL  Urine Culture: Pending    Emergency Department Course:  Nursing notes and vitals reviewed.  I performed an exam of the patient as documented above.  The above workup was undertaken.  0825: I rechecked the patient and discussed results.  Findings and plan explained to the Patient. Patient discharged home, status improved, with instructions regarding supportive care, medications, and reasons to return as well as the importance of close follow-up was reviewed.    Impression & Plan    Medical Decision Making:  Pt had complete relief of pain while enroute to ED.  Has long hx of stones.  Feels exactly like last stone.  EKG normal.  No bruising or vomiting.  Smiling and happy here.  Urine shows consistent stone no infection.  Already has CT proving normal anatomy and has a urologist she can call on way home.  She and son very comfortable with this plan.      All questions and concerns were answered. The patient was discharged home and recommended to follow up with her primary physician and return with any new or worsening symptoms.     Diagnosis:    ICD-10-CM   1. Calculus of ureter N20.1     Disposition:  Discharged to home.    Chinyere LION, am serving as a scribe on 8/8/2017 at 7:53 AM to personally document services performed by Jerome Gamboa MD, based on my observations and the provider's statements to me.    [facility] EMERGENCY DEPARTMENT       Jerome Gamboa MD  08/08/17 2478

## 2017-08-10 ENCOUNTER — HOSPITAL ENCOUNTER (OUTPATIENT)
Dept: CARDIOLOGY | Facility: CLINIC | Age: 72
Discharge: HOME | End: 2017-08-10
Attending: INTERNAL MEDICINE | Admitting: INTERNAL MEDICINE
Payer: COMMERCIAL

## 2017-08-10 DIAGNOSIS — R06.09 DYSPNEA ON EXERTION: ICD-10-CM

## 2017-08-10 DIAGNOSIS — R94.39 EQUIVOCAL STRESS TEST: ICD-10-CM

## 2017-08-10 LAB
CREAT BLD-MCNC: 0.8 MG/DL (ref 0.52–1.04)
GFR SERPL CREATININE-BSD FRML MDRD: 71 ML/MIN/1.7M2

## 2017-08-10 PROCEDURE — 25000132 ZZH RX MED GY IP 250 OP 250 PS 637: Performed by: INTERNAL MEDICINE

## 2017-08-10 PROCEDURE — 82565 ASSAY OF CREATININE: CPT

## 2017-08-10 PROCEDURE — 25000128 H RX IP 250 OP 636: Performed by: INTERNAL MEDICINE

## 2017-08-10 PROCEDURE — 25000125 ZZHC RX 250: Performed by: INTERNAL MEDICINE

## 2017-08-10 PROCEDURE — 75574 CT ANGIO HRT W/3D IMAGE: CPT | Mod: 26 | Performed by: INTERNAL MEDICINE

## 2017-08-10 PROCEDURE — 75574 CT ANGIO HRT W/3D IMAGE: CPT

## 2017-08-10 RX ORDER — METOPROLOL TARTRATE 1 MG/ML
5-15 INJECTION, SOLUTION INTRAVENOUS
Status: DISCONTINUED | OUTPATIENT
Start: 2017-08-10 | End: 2017-08-11 | Stop reason: HOSPADM

## 2017-08-10 RX ORDER — IOPAMIDOL 755 MG/ML
500 INJECTION, SOLUTION INTRAVASCULAR ONCE
Status: COMPLETED | OUTPATIENT
Start: 2017-08-10 | End: 2017-08-10

## 2017-08-10 RX ORDER — METOPROLOL TARTRATE 50 MG
50-100 TABLET ORAL
Status: COMPLETED | OUTPATIENT
Start: 2017-08-10 | End: 2017-08-10

## 2017-08-10 RX ORDER — ACYCLOVIR 200 MG/1
0-1 CAPSULE ORAL
Status: DISCONTINUED | OUTPATIENT
Start: 2017-08-10 | End: 2017-08-11 | Stop reason: HOSPADM

## 2017-08-10 RX ORDER — NITROGLYCERIN 0.4 MG/1
0.4 TABLET SUBLINGUAL
Status: DISCONTINUED | OUTPATIENT
Start: 2017-08-10 | End: 2017-08-11 | Stop reason: HOSPADM

## 2017-08-10 RX ADMIN — IOPAMIDOL 120 ML: 755 INJECTION, SOLUTION INTRAVENOUS at 16:23

## 2017-08-10 RX ADMIN — NITROGLYCERIN 0.4 MG: 0.4 TABLET SUBLINGUAL at 16:13

## 2017-08-10 RX ADMIN — METOPROLOL TARTRATE 10 MG: 5 INJECTION INTRAVENOUS at 15:06

## 2017-08-10 RX ADMIN — METOPROLOL TARTRATE 10 MG: 5 INJECTION INTRAVENOUS at 14:57

## 2017-08-10 RX ADMIN — METOPROLOL TARTRATE 100 MG: 50 TABLET, FILM COATED ORAL at 14:00

## 2017-08-10 RX ADMIN — METOPROLOL TARTRATE 10 MG: 5 INJECTION INTRAVENOUS at 15:02

## 2017-08-10 RX ADMIN — METOPROLOL TARTRATE 10 MG: 5 INJECTION INTRAVENOUS at 15:36

## 2017-08-10 RX ADMIN — SODIUM CHLORIDE 100 ML: 9 INJECTION, SOLUTION INTRAVENOUS at 16:23

## 2017-08-11 ENCOUNTER — CARE COORDINATION (OUTPATIENT)
Dept: CARDIOLOGY | Facility: CLINIC | Age: 72
End: 2017-08-11

## 2017-08-11 NOTE — PROGRESS NOTES
"Called pt and reviewed pt's CTA, calcium score and advised pt of Dr. Bowden' note/recommendation (below).  Pt pleased and is excited to start exercising again.   Jerrica RO    Notes Recorded by Lino Bowden MD on 8/11/2017 at 11:56 AM  CTA shows only mild coronary disease.  Calcium score overall is appropriate and expected for her age.  Nuclear stress test was a false positive.  No further treatment needed.  No medication changes.    Lino Bowden MD  Cardiology - [facility]  Pager: [phone]  Text Page  August 11, 2017  ------  Notes Recorded by Ally Moss RN on 8/11/2017 at 9:28 AM  Review.   Last OV 8/2 for abnormal stress test - \"Lexiscan nuclear stress in July 2017 shows moderate sized defect of moderate intensity of the mid to basal anterior wall and basal anteroseptal wall, partially reversible, EF 65%.  This defect is new compared to 2013\".  "

## 2017-08-23 DIAGNOSIS — N20.0 CALCULUS OF KIDNEY: Primary | ICD-10-CM

## 2017-08-24 DIAGNOSIS — N20.0 CALCULUS OF KIDNEY: Primary | ICD-10-CM

## 2017-08-25 ENCOUNTER — HOSPITAL ENCOUNTER (OUTPATIENT)
Dept: GENERAL RADIOLOGY | Facility: CLINIC | Age: 72
Discharge: HOME | End: 2017-08-25
Attending: UROLOGY | Admitting: UROLOGY
Payer: COMMERCIAL

## 2017-08-25 ENCOUNTER — OFFICE VISIT (OUTPATIENT)
Dept: UROLOGY | Facility: CLINIC | Age: 72
End: 2017-08-25
Payer: COMMERCIAL

## 2017-08-25 VITALS
BODY MASS INDEX: 36.7 KG/M2 | HEIGHT: 64 IN | WEIGHT: 215 LBS | DIASTOLIC BLOOD PRESSURE: 72 MMHG | SYSTOLIC BLOOD PRESSURE: 110 MMHG | HEART RATE: 88 BPM

## 2017-08-25 DIAGNOSIS — N20.0 CALCULUS OF KIDNEY: ICD-10-CM

## 2017-08-25 LAB
ALBUMIN UR-MCNC: NEGATIVE MG/DL
APPEARANCE UR: CLEAR
BILIRUB UR QL STRIP: NEGATIVE
COLOR UR AUTO: YELLOW
GLUCOSE UR STRIP-MCNC: 500 MG/DL
HGB UR QL STRIP: ABNORMAL
KETONES UR STRIP-MCNC: ABNORMAL MG/DL
LEUKOCYTE ESTERASE UR QL STRIP: NEGATIVE
NITRATE UR QL: NEGATIVE
PH UR STRIP: 5.5 PH (ref 5–7)
SOURCE: ABNORMAL
SP GR UR STRIP: 1.02 (ref 1–1.03)
UROBILINOGEN UR STRIP-ACNC: 0.2 EU/DL (ref 0.2–1)

## 2017-08-25 PROCEDURE — 99213 OFFICE O/P EST LOW 20 MIN: CPT | Performed by: UROLOGY

## 2017-08-25 PROCEDURE — 74010 XR KUB: CPT | Mod: 52

## 2017-08-25 PROCEDURE — 81003 URINALYSIS AUTO W/O SCOPE: CPT | Performed by: UROLOGY

## 2017-08-25 ASSESSMENT — PAIN SCALES - GENERAL: PAINLEVEL: NO PAIN (0)

## 2017-08-25 NOTE — MR AVS SNAPSHOT
After Visit Summary   8/25/2017    Debi Mo    MRN: [id number]           Patient Information     Date Of Birth          1945        Visit Information        Provider Department      8/25/2017 3:00 PM Lester Orona MD [facility] [place]        Today's Diagnoses     Calculus of kidney           Follow-ups after your visit        Follow-up notes from your care team     Return for KUB same day.      Your next 10 appointments already scheduled     Oct 10, 2017 10:30 AM CDT   LAB with EA LAB   [facility] ([facility]    [address] [place] [address]  [address]  [facility] [id number] [phone]           Patient must bring picture ID. Patient should be prepared to give a urine specimen  Please do not eat 10-12 hours before your appointment if you are coming in fasting for labs on lipids, cholesterol, or glucose (sugar). Pregnant women should follow their Care Team instructions. Water with medications is okay. Do not drink coffee or other fluids. If you have concerns about taking  your medications, please ask at office or if scheduling via [employer], send a message by clicking on Secure Messaging, Message Your Care Team.            Oct 10, 2017 11:00 AM CDT   Office Visit with Niesha Dietrich MD   [facility] ([facility]    [address] [place] [address]  [address]  [facility] [address] [phone]           Bring a current list of meds and any records pertaining to this visit. For Physicals, please bring immunization records and any forms needing to be filled out. Please arrive 10 minutes early to complete paperwork.              Future tests that were ordered for you today     Open Future Orders        Priority Expected Expires Ordered    XR KUB Routine 8/25/2017 8/25/2018 8/25/2017    XR KUB Routine 8/24/2017 8/24/2018 8/24/2017            Who to contact     If you have questions or need  "follow up information about today's clinic visit or your schedule please contact [facility] [place] directly at [phone].  Normal or non-critical lab and imaging results will be communicated to you by MyChart, letter or phone within 4 business days after the clinic has received the results. If you do not hear from us within 7 days, please contact the clinic through [employer] or phone. If you have a critical or abnormal lab result, we will notify you by phone as soon as possible.  Submit refill requests through [employer] or call your pharmacy and they will forward the refill request to us. Please allow 3 business days for your refill to be completed.          Additional Information About Your Visit        [employer] Information     [employer] gives you secure access to your electronic health record. If you see a primary care provider, you can also send messages to your care team and make appointments. If you have questions, please call your primary care clinic.  If you do not have a primary care provider, please call [phone] and they will assist you.        Care EveryWhere ID     This is your Care EveryWhere ID. This could be used by other organizations to access your [facility] medical records  [id number]        Your Vitals Were     Pulse Height BMI (Body Mass Index)             88 1.626 m (5' 4\") 36.9 kg/m2          Blood Pressure from Last 3 Encounters:   08/25/17 110/72   08/08/17 132/80   08/02/17 128/66    Weight from Last 3 Encounters:   08/25/17 97.5 kg (215 lb)   08/08/17 97.5 kg (215 lb)   08/02/17 96.8 kg (213 lb 6.4 oz)              We Performed the Following     UA without Microscopic [[id number]]        Primary Care Provider    None Specified       No primary provider on file.        Equal Access to Services     SLADE LOPES : Beryl Wiseman, romy chang, laura phillips. So wacristiano " [phone].    ATENCIÓN: Si yen membreno, tiene a grissom disposición servicios gratuitos de asistencia lingüística. Skyler cedillo [phone].    We comply with applicable federal civil rights laws and [place] laws. We do not discriminate on the basis of race, color, national origin, age, disability sex, sexual orientation or gender identity.            Thank you!     Thank you for choosing [facility] [place]  for your care. Our goal is always to provide you with excellent care. Hearing back from our patients is one way we can continue to improve our services. Please take a few minutes to complete the written survey that you may receive in the mail after your visit with us. Thank you!             Your Updated Medication List - Protect others around you: Learn how to safely use, store and throw away your medicines at www.disposemymeds.org.          This list is accurate as of: 8/25/17  3:19 PM.  Always use your most recent med list.                   Brand Name Dispense Instructions for use Diagnosis    aspirin 81 MG tablet      Take 81 mg by mouth daily        blood glucose monitoring lancets     100 each    Use to test blood sugars 1 times daily or as directed.    Type 2 diabetes mellitus with diabetic polyneuropathy, without long-term current use of insulin (H)       blood glucose monitoring meter device kit     1 kit    Use to test blood sugars 1 times daily or as directed.    Type 2 diabetes, controlled, with peripheral neuropathy (H)       blood glucose monitoring test strip    ONE TOUCH VERIO IQ    50 each    Use to test blood sugars 1 times daily or as directed.    Type 2 diabetes mellitus with diabetic polyneuropathy, without long-term current use of insulin (H)       CO Q 10 PO      Take 100 mg by mouth daily        cyanocobalamin 1000 MCG tablet    vitamin  B-12    100 tablet    Take 1 tablet by mouth daily.    Vitamin B12 deficiency (non anaemic)       lisinopril 40 MG  tablet    PRINIVIL/ZESTRIL    90 tablet    Take 1 tablet (40 mg) by mouth daily    Microalbuminuria       metFORMIN 500 MG tablet    GLUCOPHAGE    360 tablet    Take 2 tablets (1,000 mg) by mouth 2 times daily (with meals)    Type 2 diabetes mellitus with diabetic polyneuropathy, without long-term current use of insulin (H)       metoprolol 50 MG tablet    LOPRESSOR    2 tablet    Take 1 tablet (50 mg) by mouth as needed    Equivocal stress test, Dyspnea on exertion       potassium citrate 10 MEQ (1080 MG) CR tablet    UROCIT-K    270 tablet    Take 1 tablet (10 mEq) by mouth 3 times daily (with meals)    Calculus of kidney       simvastatin 20 MG tablet    ZOCOR    90 tablet    Take 1 tablet (20 mg) by mouth At Bedtime    Hyperlipidemia LDL goal <70       VITAMIN D3 PO      Take 4,000 Units by mouth daily.

## 2017-08-25 NOTE — PROGRESS NOTES
Office Visit Note  [facility]  [phone]    Aug 25, 2017    [unfilled]    1945    UROLOGIC DIAGNOSES:   History of uric acid stones    CURRENT INTERVENTIONS:   Potassium citrate    HISTORY:   Debi returns to clinic today for kidney stone follow-up. She was in the emergency department with flank pain 2 weeks ago but the pain resolved by the time she got to the emergency department. Urinalysis did show hematuria at the time. No imaging was performed and she was sent home from the emergency department. I had her get a KUB on the way into the office today. She has had no further pain since that time in the past 2 weeks. Otherwise the past year she has had no symptoms. She is taking potassium citrate.    Imaging:  I reviewed her KUB images from today. I do not see any stones.    Labs:      MEDICATIONS:    Current Outpatient Prescriptions:      metoprolol (LOPRESSOR) 50 MG tablet, Take 1 tablet (50 mg) by mouth as needed, Disp: 2 tablet, Rfl: 0     aspirin 81 MG tablet, Take 81 mg by mouth daily, Disp: , Rfl:      metFORMIN (GLUCOPHAGE) 500 MG tablet, Take 2 tablets (1,000 mg) by mouth 2 times daily (with meals), Disp: 360 tablet, Rfl: 0     blood glucose monitoring (ONE TOUCH DELICA) lancets, Use to test blood sugars 1 times daily or as directed., Disp: 100 each, Rfl: 11     blood glucose monitoring (ONE TOUCH VERIO IQ) test strip, Use to test blood sugars 1 times daily or as directed., Disp: 50 each, Rfl: 11     potassium citrate (UROCIT-K) 10 MEQ (1080 MG) CR tablet, Take 1 tablet (10 mEq) by mouth 3 times daily (with meals), Disp: 270 tablet, Rfl: 1     simvastatin (ZOCOR) 20 MG tablet, Take 1 tablet (20 mg) by mouth At Bedtime, Disp: 90 tablet, Rfl: 2     lisinopril (PRINIVIL/ZESTRIL) 40 MG tablet, Take 1 tablet (40 mg) by mouth daily, Disp: 90 tablet, Rfl: 2     Coenzyme Q10 (CO Q 10 PO), Take 100 mg by mouth daily , Disp: , Rfl:      blood glucose monitoring (ONETOUCH VERIO) meter device  kit, Use to test blood sugars 1 times daily or as directed., Disp: 1 kit, Rfl: 0     Cholecalciferol (VITAMIN D3 PO), Take 4,000 Units by mouth daily., Disp: , Rfl:      cyanocolbalamin (VITAMIN  B-12) 1000 MCG tablet, Take 1 tablet by mouth daily., Disp: 100 tablet, Rfl: 3    ALLERGIES:     Allergies   Allergen Reactions     Dust Mites      Seasonal Allergies        REVIEW OF SYSTEMS: Ten point review of systems without change as outlined in HPI    SURGICAL HISTORY:    Past Surgical History:   Procedure Laterality Date     CARDIAC SURGERY      ANGIOGRAM     CHOLECYSTECTOMY       COLONOSCOPY       COMBINED CYSTOSCOPY, RETROGRADES, URETEROSCOPY, LASER HOLMIUM LITHOTRIPSY URETER(S), INSERT STENT Bilateral 9/28/2016    Procedure: COMBINED CYSTOSCOPY, RETROGRADES, URETEROSCOPY, LASER HOLMIUM LITHOTRIPSY URETER(S), INSERT STENT;  Surgeon: Lester Orona MD;  Location:  OR     CYSTOSCOPY       CYSTOSCOPY, RETROGRADES, COMBINED  4/17/2013    Procedure: COMBINED CYSTOSCOPY, RETROGRADES;;  Surgeon: Lester Orona MD;  Location:  OR     CYSTOSCOPY, URETEROSCOPY, COMBINED  11/12/2013    Procedure: COMBINED CYSTOSCOPY, URETEROSCOPY;  CYSTOSCOPY, LEFT URETEROSCOPY, LEFT EXTRACORPOREAL SHOCKWAVE LITHOTRIPSY  ;  Surgeon: Lester Orona MD;  Location:  OR     EXTRACORPOREAL SHOCK WAVE LITHOTRIPSY (ESWL)  11/12/2013    Procedure: EXTRACORPOREAL SHOCK WAVE LITHOTRIPSY (ESWL);;  Surgeon: Lester Orona MD;  Location:  OR     EXTRACORPOREAL SHOCK WAVE LITHOTRIPSY (ESWL) Bilateral 4/28/2015    Procedure: EXTRACORPOREAL SHOCK WAVE LITHOTRIPSY (ESWL);  Surgeon: Lester Orona MD;  Location:  OR     EXTRACORPOREAL SHOCK WAVE LITHOTRIPSY, CYSTOSCOPY, INSERT STENT URETER(S), COMBINED  1/10/2012    Procedure:COMBINED EXTRACORPOREAL SHOCK WAVE LITHOTRIPSY, CYSTOSCOPY, INSERT STENT URETER(S); LEFT EXTRACORPOREAL SHOCKWAVE LITHOTRIPSY, LEFT STENT; Surgeon:LESTER ORONA; Location:  "OR     GALLBLADDER SURGERY  2009     HYSTERECTOMY VAGINAL  1993    left  the ovaries     LASER HOLMIUM LITHOTRIPSY URETER(S), INSERT STENT, COMBINED  4/17/2013    Procedure: COMBINED CYSTOSCOPY, URETEROSCOPY, LASER HOLMIUM LITHOTRIPSY URETER(S), INSERT STENT;  CYSTOSCOPY, Right URETEROSCOPY, LASER HOLMIUM LITHOTRIPSY URETER(S) standby only,Stone basketing, Right Retrogrades, ;  Surgeon: Lester Orona MD;  Location: [facility] OR     SHOULDER SURGERY  2009    left rotator cuff         PHYSICAL EXAM:    /72  Pulse 88  Ht 1.626 m (5' 4\")  Wt 97.5 kg (215 lb)  BMI 36.9 kg/m2    HEENT: Normocephalic and atraumatic    Cardiac: Not done    Back/Flank: Not done    CNS/PNS: Alert and oriented    Respiratory: Normal nonlabored breathing    Abdomen: Soft nontender and nondistended    Peripheral Vascular: No peripheral edema    Mental Status: Normal    External Genitalia: Not done    Bladder: Not done    Urethra: Not done     Vagina: Not done    Cystoscopy:  Not Done      Urinalysis:  UA RESULTS:  Recent Labs   Lab Test  08/25/17   1503  08/08/17   0739   COLOR  Yellow  Yellow   APPEARANCE  Clear  Slightly Cloudy   URINEGLC  500*  >499*   URINEBILI  Negative  Negative   URINEKETONE  Trace*  20*   SG  1.020  1.021   UBLD  Trace*  Large*   URINEPH  5.5  5.0   PROTEIN  Negative  30*   UROBILINOGEN  0.2   --    NITRITE  Negative  Negative   LEUKEST  Negative  Negative   RBCU   --   18*   WBCU   --   3*         IMPRESSION:    No evidence of stones    PLAN:    I do not see any evidence of stones on KUB today. I did give her the caveat that her stones were composed mainly of uric acid, however previous stones have been visible on KUB. We discussed CT versus observation. She wishes to observe. I will plan on seeing her back again in 1 year with another KUB unless there are any symptoms or problems in the interim.    Total Time:  15 minutes  Time in Consultation: 15 minutes      Lester Orona M.D.                      "

## 2017-08-25 NOTE — LETTER
8/25/2017       RE: Debi Mo  [address] [place] [address]     Dear Colleague,    Thank you for referring your patient, Debi Mo, to the [facility] [place] at [facility]. Please see a copy of my visit note below.    Office Visit Note  [facility]  [phone]    Aug 25, 2017    [unfilled]    1945    UROLOGIC DIAGNOSES:   History of uric acid stones    CURRENT INTERVENTIONS:   Potassium citrate    HISTORY:   Debi returns to clinic today for kidney stone follow-up. She was in the emergency department with flank pain 2 weeks ago but the pain resolved by the time she got to the emergency department. Urinalysis did show hematuria at the time. No imaging was performed and she was sent home from the emergency department. I had her get a KUB on the way into the office today. She has had no further pain since that time in the past 2 weeks. Otherwise the past year she has had no symptoms. She is taking potassium citrate.    Imaging:  I reviewed her KUB images from today. I do not see any stones.  Labs:      MEDICATIONS:    Current Outpatient Prescriptions:      metoprolol (LOPRESSOR) 50 MG tablet, Take 1 tablet (50 mg) by mouth as needed, Disp: 2 tablet, Rfl: 0     aspirin 81 MG tablet, Take 81 mg by mouth daily, Disp: , Rfl:      metFORMIN (GLUCOPHAGE) 500 MG tablet, Take 2 tablets (1,000 mg) by mouth 2 times daily (with meals), Disp: 360 tablet, Rfl: 0     blood glucose monitoring (ONE TOUCH DELICA) lancets, Use to test blood sugars 1 times daily or as directed., Disp: 100 each, Rfl: 11     blood glucose monitoring (ONE TOUCH VERIO IQ) test strip, Use to test blood sugars 1 times daily or as directed., Disp: 50 each, Rfl: 11     potassium citrate (UROCIT-K) 10 MEQ (1080 MG) CR tablet, Take 1 tablet (10 mEq) by mouth 3 times daily (with meals), Disp: 270 tablet, Rfl: 1     simvastatin (ZOCOR) 20 MG  tablet, Take 1 tablet (20 mg) by mouth At Bedtime, Disp: 90 tablet, Rfl: 2     lisinopril (PRINIVIL/ZESTRIL) 40 MG tablet, Take 1 tablet (40 mg) by mouth daily, Disp: 90 tablet, Rfl: 2     Coenzyme Q10 (CO Q 10 PO), Take 100 mg by mouth daily , Disp: , Rfl:      blood glucose monitoring (ONETOUCH VERIO) meter device kit, Use to test blood sugars 1 times daily or as directed., Disp: 1 kit, Rfl: 0     Cholecalciferol (VITAMIN D3 PO), Take 4,000 Units by mouth daily., Disp: , Rfl:      cyanocolbalamin (VITAMIN  B-12) 1000 MCG tablet, Take 1 tablet by mouth daily., Disp: 100 tablet, Rfl: 3    ALLERGIES:     Allergies   Allergen Reactions     Dust Mites      Seasonal Allergies      REVIEW OF SYSTEMS: Ten point review of systems without change as outlined in HPI    SURGICAL HISTORY:    Past Surgical History:   Procedure Laterality Date     CARDIAC SURGERY      ANGIOGRAM     CHOLECYSTECTOMY       COLONOSCOPY       COMBINED CYSTOSCOPY, RETROGRADES, URETEROSCOPY, LASER HOLMIUM LITHOTRIPSY URETER(S), INSERT STENT Bilateral 9/28/2016    Procedure: COMBINED CYSTOSCOPY, RETROGRADES, URETEROSCOPY, LASER HOLMIUM LITHOTRIPSY URETER(S), INSERT STENT;  Surgeon: Lester Orona MD;  Location: [facility]     CYSTOSCOPY       CYSTOSCOPY, RETROGRADES, COMBINED  4/17/2013    Procedure: COMBINED CYSTOSCOPY, RETROGRADES;;  Surgeon: Lester Orona MD;  Location: [facility]     CYSTOSCOPY, URETEROSCOPY, COMBINED  11/12/2013    Procedure: COMBINED CYSTOSCOPY, URETEROSCOPY;  CYSTOSCOPY, LEFT URETEROSCOPY, LEFT EXTRACORPOREAL SHOCKWAVE LITHOTRIPSY  ;  Surgeon: Lester Orona MD;  Location: [facility]     EXTRACORPOREAL SHOCK WAVE LITHOTRIPSY (ESWL)  11/12/2013    Procedure: EXTRACORPOREAL SHOCK WAVE LITHOTRIPSY (ESWL);;  Surgeon: Lester Orona MD;  [facility]: [facility]     EXTRACORPOREAL SHOCK WAVE LITHOTRIPSY (ESWL) Bilateral 4/28/2015    Procedure: EXTRACORPOREAL SHOCK WAVE LITHOTRIPSY (ESWL);  Surgeon: Lester Orona,  "MD;  Location: [facility]     EXTRACORPOREAL SHOCK WAVE LITHOTRIPSY, CYSTOSCOPY, INSERT STENT URETER(S), COMBINED  1/10/2012    Procedure:COMBINED EXTRACORPOREAL SHOCK WAVE LITHOTRIPSY, CYSTOSCOPY, INSERT STENT URETER(S); LEFT EXTRACORPOREAL SHOCKWAVE LITHOTRIPSY, LEFT STENT; Surgeon:LESTER ORONA; Location:[facility]     GALLBLADDER SURGERY  2009     HYSTERECTOMY VAGINAL  1993    left  the ovaries     LASER HOLMIUM LITHOTRIPSY URETER(S), INSERT STENT, COMBINED  4/17/2013    Procedure: COMBINED CYSTOSCOPY, URETEROSCOPY, LASER HOLMIUM LITHOTRIPSY URETER(S), INSERT STENT;  CYSTOSCOPY, Right URETEROSCOPY, LASER HOLMIUM LITHOTRIPSY URETER(S) standby only,Stone basketing, Right Retrogrades, ;  Surgeon: Lester Orona MD;  Location: [facility]     SHOULDER SURGERY  2009    left rotator cuff     PHYSICAL EXAM:  /72  Pulse 88  Ht 1.626 m (5' 4\")  Wt 97.5 kg (215 lb)  BMI 36.9 kg/m2    HEENT: Normocephalic and atraumatic    Cardiac: Not done    Back/Flank: Not done    CNS/PNS: Alert and oriented    Respiratory: Normal nonlabored breathing    Abdomen: Soft nontender and nondistended    Peripheral Vascular: No peripheral edema    Mental Status: Normal    External Genitalia: Not done    Bladder: Not done    Urethra: Not done     Vagina: Not done    Cystoscopy:  Not Done    Urinalysis:  UA RESULTS:  Recent Labs   Lab Test  08/25/17   1503  08/08/17   0739   COLOR  Yellow  Yellow   APPEARANCE  Clear  Slightly Cloudy   URINEGLC  500*  >499*   URINEBILI  Negative  Negative   URINEKETONE  Trace*  20*   SG  1.020  1.021   UBLD  Trace*  Large*   URINEPH  5.5  5.0   PROTEIN  Negative  30*   UROBILINOGEN  0.2   --    NITRITE  Negative  Negative   LEUKEST  Negative  Negative   RBCU   --   18*   WBCU   --   3*     IMPRESSION:    No evidence of stones    PLAN:    I do not see any evidence of stones on KUB today. I did give her the caveat that her stones were composed mainly of uric acid, however previous stones have been " visible on KUB. We discussed CT versus observation. She wishes to observe. I will plan on seeing her back again in 1 year with another KUB unless there are any symptoms or problems in the interim.    Total Time:  15 minutes  Time in Consultation: 15 minutes  Lester Orona M.D.

## 2017-09-26 DIAGNOSIS — N20.0 CALCULUS OF KIDNEY: ICD-10-CM

## 2017-09-26 RX ORDER — POTASSIUM CITRATE 10 MEQ/1
10 TABLET, EXTENDED RELEASE ORAL
Qty: 270 TABLET | Refills: 1 | Status: SHIPPED | OUTPATIENT
Start: 2017-09-26 | End: 2018-05-30

## 2017-10-05 ENCOUNTER — DOCUMENTATION ONLY (OUTPATIENT)
Dept: LAB | Facility: CLINIC | Age: 72
End: 2017-10-05

## 2017-10-05 DIAGNOSIS — E11.42 TYPE 2 DIABETES MELLITUS WITH DIABETIC POLYNEUROPATHY, WITHOUT LONG-TERM CURRENT USE OF INSULIN (H): Primary | ICD-10-CM

## 2017-10-05 NOTE — PROGRESS NOTES
Please place laboratory orders for patient upcoming PRE VISIT appointment on  10/10/17  Thank you

## 2017-10-10 ENCOUNTER — OFFICE VISIT (OUTPATIENT)
Dept: PEDIATRICS | Facility: CLINIC | Age: 72
End: 2017-10-10
Payer: COMMERCIAL

## 2017-10-10 VITALS
OXYGEN SATURATION: 98 % | WEIGHT: 213.2 LBS | HEART RATE: 91 BPM | BODY MASS INDEX: 36.4 KG/M2 | HEIGHT: 64 IN | DIASTOLIC BLOOD PRESSURE: 78 MMHG | TEMPERATURE: 95.4 F | SYSTOLIC BLOOD PRESSURE: 138 MMHG

## 2017-10-10 DIAGNOSIS — E11.42 TYPE 2 DIABETES MELLITUS WITH DIABETIC POLYNEUROPATHY, WITHOUT LONG-TERM CURRENT USE OF INSULIN (H): Primary | ICD-10-CM

## 2017-10-10 DIAGNOSIS — E78.5 HYPERLIPIDEMIA LDL GOAL <70: ICD-10-CM

## 2017-10-10 DIAGNOSIS — N18.1 BENIGN HYPERTENSION WITH CKD (CHRONIC KIDNEY DISEASE) STAGE I: ICD-10-CM

## 2017-10-10 DIAGNOSIS — E11.42 TYPE 2 DIABETES MELLITUS WITH DIABETIC POLYNEUROPATHY, WITHOUT LONG-TERM CURRENT USE OF INSULIN (H): ICD-10-CM

## 2017-10-10 DIAGNOSIS — I12.9 BENIGN HYPERTENSION WITH CKD (CHRONIC KIDNEY DISEASE) STAGE I: ICD-10-CM

## 2017-10-10 DIAGNOSIS — Z23 NEED FOR INFLUENZA VACCINATION: ICD-10-CM

## 2017-10-10 LAB — HBA1C MFR BLD: 8.4 % (ref 4.3–6)

## 2017-10-10 PROCEDURE — 83036 HEMOGLOBIN GLYCOSYLATED A1C: CPT | Performed by: INTERNAL MEDICINE

## 2017-10-10 PROCEDURE — G0008 ADMIN INFLUENZA VIRUS VAC: HCPCS | Performed by: INTERNAL MEDICINE

## 2017-10-10 PROCEDURE — 90662 IIV NO PRSV INCREASED AG IM: CPT | Performed by: INTERNAL MEDICINE

## 2017-10-10 PROCEDURE — 99214 OFFICE O/P EST MOD 30 MIN: CPT | Mod: 25 | Performed by: INTERNAL MEDICINE

## 2017-10-10 PROCEDURE — 36415 COLL VENOUS BLD VENIPUNCTURE: CPT | Performed by: INTERNAL MEDICINE

## 2017-10-10 NOTE — PATIENT INSTRUCTIONS
I would like you to check sugars at home once a day for 3 months. If the A1C is not under 8 then we will have to start on more medication.     Plan on how you will exercise when it gets cold and snowy outside. Know what you will do.     Work on decreasing salt in diet.     Check the bottom of your feet every night.     Appointment scheduled January 10th at 11:00 AM; Lab appointment January 10th at 10:40 AM.

## 2017-10-10 NOTE — PROGRESS NOTES
SUBJECTIVE:   Debi Mo is a 72 year old female who presents to clinic today for the following health issues:    Debi presents to the clinic for a diabetes follow up. Patients current medications include Metformin 500 MG, simvastatin 20 MG and lisinopril 40 MG for hypertension.     Patient reports since her last visit she has not been checking her blood sugars at home. This morning her blood sugars were 197. She has sx of hypoglycemia only if she waits to eat.     Reports for exercise she has been walking but the last couple weeks she has not been walking since she has been working on her yard. She reports no problems taking medication. Numbness and tingling in feet are reported the same and does not keep her up. A1C on 10/10 was 8.4. She contributes this to a bad diet.     BP in clinic was 138/78. Weight is stable at 213 lbs.       Diabetes Follow-up      Patient is checking blood sugars: not at all    Diabetic concerns: None     Symptoms of hypoglycemia (low blood sugar): shaky, dizzy, if waits to long to eat     Paresthesias (numbness or burning in feet) or sores: Yes neuropathy in feet      Date of last diabetic eye exam: 4/27/2017    Hyperlipidemia Follow-Up      Rate your low fat/cholesterol diet?: not monitoring fat    Taking statin?  Yes, muscle aches after starting statin    Other lipid medications/supplements?:  none    Hypertension Follow-up      Outpatient blood pressures occasional     Low Salt Diet: not monitoring salt        Amount of exercise or physical activity: 2-3 days/week for an average of 30-45 minutes    Problems taking medications regularly: No    Medication side effects: none  Diet: regular (no restrictions)          Problem list and histories reviewed & adjusted, as indicated.  Additional history: as documented    Patient Active Problem List   Diagnosis     Seasonal allergic rhinitis     Advance Care Planning     Vitamin B12 deficiency without anemia     High triglycerides      Benign hypertension with CKD (chronic kidney disease) stage I     History of nephrolithiasis     Serum calcium elevated     Hyperlipidemia LDL goal <70     Peripheral neuropathy     Health Care Home     LBBB (left bundle branch block)     Vitamin D deficiency     Microalbuminuria     Coronary artery disease involving native coronary artery without angina pectoris     Morbid obesity due to excess calories (H)     Type 2 diabetes mellitus with diabetic polyneuropathy, without long-term current use of insulin (H)     Past Surgical History:   Procedure Laterality Date     CARDIAC SURGERY      ANGIOGRAM     CHOLECYSTECTOMY       COLONOSCOPY       COMBINED CYSTOSCOPY, RETROGRADES, URETEROSCOPY, LASER HOLMIUM LITHOTRIPSY URETER(S), INSERT STENT Bilateral 9/28/2016    Procedure: COMBINED CYSTOSCOPY, RETROGRADES, URETEROSCOPY, LASER HOLMIUM LITHOTRIPSY URETER(S), INSERT STENT;  Surgeon: Lester Orona MD;  Location: [facility]     CYSTOSCOPY       CYSTOSCOPY, RETROGRADES, COMBINED  4/17/2013    Procedure: COMBINED CYSTOSCOPY, RETROGRADES;;  Surgeon: Lester Orona MD;  Location: [facility]     CYSTOSCOPY, URETEROSCOPY, COMBINED  11/12/2013    Procedure: COMBINED CYSTOSCOPY, URETEROSCOPY;  CYSTOSCOPY, LEFT URETEROSCOPY, LEFT EXTRACORPOREAL SHOCKWAVE LITHOTRIPSY  ;  Surgeon: Lester Orona MD;  [facility]: [facility]     EXTRACORPOREAL SHOCK WAVE LITHOTRIPSY (ESWL)  11/12/2013    Procedure: EXTRACORPOREAL SHOCK WAVE LITHOTRIPSY (ESWL);;  Surgeon: Lester Orona MD;  [facility]     EXTRACORPOREAL SHOCK WAVE LITHOTRIPSY (ESWL) Bilateral 4/28/2015    Procedure: EXTRACORPOREAL SHOCK WAVE LITHOTRIPSY (ESWL);  Surgeon: Lester Orona MD;  [facility]     EXTRACORPOREAL SHOCK WAVE LITHOTRIPSY, CYSTOSCOPY, INSERT STENT URETER(S), COMBINED  1/10/2012    Procedure:COMBINED EXTRACORPOREAL SHOCK WAVE LITHOTRIPSY, CYSTOSCOPY, INSERT STENT URETER(S); LEFT EXTRACORPOREAL SHOCKWAVE LITHOTRIPSY, LEFT STENT;  Surgeon:LESTER ORONA; Location:[facility]     GALLBLADDER SURGERY  2009     HYSTERECTOMY VAGINAL  1993    left  the ovaries     LASER HOLMIUM LITHOTRIPSY URETER(S), INSERT STENT, COMBINED  4/17/2013    Procedure: COMBINED CYSTOSCOPY, URETEROSCOPY, LASER HOLMIUM LITHOTRIPSY URETER(S), INSERT STENT;  CYSTOSCOPY, Right URETEROSCOPY, LASER HOLMIUM LITHOTRIPSY URETER(S) standby only,Stone basketing, Right Retrogrades, ;  Surgeon: Lester Orona MD;  Location: [facility]     SHOULDER SURGERY  2009    left rotator cuff       Social History   Substance Use Topics     Smoking status: Never Smoker     Smokeless tobacco: Never Used     Alcohol use No     Family History   Problem Relation Age of Onset     Respiratory Mother      CHF     Alzheimer Disease Mother      Dementia     HEART DISEASE Father      Heart Attack     Respiratory Father      CHF     Breast Cancer Maternal Grandmother      Breast Cancer Paternal Grandmother      Arthritis Brother      Arthritis Sister      Hypertension Son      DIABETES Daughter      Arthritis Sister      Arthritis Sister          Current Outpatient Prescriptions   Medication Sig Dispense Refill     potassium citrate (UROCIT-K) 10 MEQ (1080 MG) CR tablet Take 1 tablet (10 mEq) by mouth 3 times daily (with meals) 270 tablet 1     aspirin 81 MG tablet Take 81 mg by mouth daily       metFORMIN (GLUCOPHAGE) 500 MG tablet Take 2 tablets (1,000 mg) by mouth 2 times daily (with meals) 360 tablet 0     blood glucose monitoring (ONE TOUCH DELICA) lancets Use to test blood sugars 1 times daily or as directed. 100 each 11     blood glucose monitoring (ONE TOUCH VERIO IQ) test strip Use to test blood sugars 1 times daily or as directed. 50 each 11     simvastatin (ZOCOR) 20 MG tablet Take 1 tablet (20 mg) by mouth At Bedtime 90 tablet 2     lisinopril (PRINIVIL/ZESTRIL) 40 MG tablet Take 1 tablet (40 mg) by mouth daily 90 tablet 2     Coenzyme Q10 (CO Q 10 PO) Take 100 mg by mouth daily         blood glucose monitoring (ONETOUCH VERIO) meter device kit Use to test blood sugars 1 times daily or as directed. 1 kit 0     Cholecalciferol (VITAMIN D3 PO) Take 4,000 Units by mouth daily.       cyanocolbalamin (VITAMIN  B-12) 1000 MCG tablet Take 1 tablet by mouth daily. 100 tablet 3     Allergies   Allergen Reactions     Dust Mites      Seasonal Allergies      Recent Labs   Lab Test  10/10/17   1033  08/10/17   1551  07/13/17   1002  04/06/17   0928  10/12/16   1038   04/15/16   1020   10/15/15   1114   04/23/15   0400   04/10/15   0910  10/14/14   0940   A1C  8.4*   --   7.5*  7.8*  7.1*   < >  7.6*   --    --    < >   --    --   6.5*  6.3*   LDL   --    --    --   34   --    --   27   --    --    --    --    --   26   --    HDL   --    --    --   34*   --    --   33*   --    --    --    --    --   35*   --    TRIG   --    --    --   314*   --    --   331*   --   367*   < >   --    --   382*   --    ALT   --    --    --   45   --    --    --    --    --    --   29   --    --   19   CR   --    --    --   0.88  0.83   --    --    < >   --    --   0.75   < >   --   0.80   GFRESTIMATED   --   71   --   63  68   --    --    < >   --    --   77   < >   --   72   GFRESTBLACK   --   85   --   77  82   --    --    < >   --    --   >90   GFR Calc     < >   --   87   POTASSIUM   --    --    --   4.7  4.4   < >   --    < >   --    --   4.1   < >   --   4.2   TSH   --    --    --    --   1.58   --    --    --    --    --    --    --    --   2.20    < > = values in this interval not displayed.      BP Readings from Last 3 Encounters:   10/10/17 138/78   08/25/17 110/72   08/08/17 132/80    Wt Readings from Last 3 Encounters:   10/10/17 96.7 kg (213 lb 3.2 oz)   08/25/17 97.5 kg (215 lb)   08/08/17 97.5 kg (215 lb)                          Reviewed and updated as needed this visit by clinical staffTobacco  Allergies  Meds  Med Hx  Surg Hx  Fam Hx  Soc Hx      Reviewed and updated as needed this visit by  "Provider         ROS:  Constitutional, HEENT, cardiovascular, pulmonary, GI, , musculoskeletal, neuro, skin, endocrine and psych systems are negative, except as otherwise noted.    NEURO POSITIVE for numbness and paraesthesia in feet       This document serves as a record of the services and decisions personally performed and made by Niesha Dietrich MD. It was created on her behalf by Norma Knowles, a trained medical scribe. The creation of this document is based the provider's statements to the medical scribe.    Norma Knowles October 10, 2017 11:26 AM  OBJECTIVE:   /78 (BP Location: Right arm, Patient Position: Sitting, Cuff Size: Adult Regular)  Pulse 91  Temp 95.4  F (35.2  C) (Tympanic)  Ht 1.626 m (5' 4\")  Wt 96.7 kg (213 lb 3.2 oz)  SpO2 98%  BMI 36.6 kg/m2  Body mass index is 36.6 kg/(m^2).  GENERAL:  alert and no distress  HENT: ear canals and TM's normal, nose and mouth without ulcers or lesions  RESP: lungs clear to auscultation - no rales, rhonchi or wheezes  CV: regular rate and rhythm, normal S1 S2, no S3 or S4, no murmur, click or rub, no peripheral edema and peripheral pulses strong  ABDOMEN: soft, nontender, no hepatosplenomegaly, no masses and bowel sounds normal  PSYCH: mentation appears normal, affect normal/bright  Diabetic foot exam: normal DP and PT pulses, no trophic changes or ulcerative lesions, normal sensory exam and normal monofilament exam    Diagnostic Test Results:  Results for orders placed or performed in visit on 10/10/17 (from the past 24 hour(s))   Hemoglobin A1c   Result Value Ref Range    Hemoglobin A1C 8.4 (H) 4.3 - 6.0 %       ASSESSMENT/PLAN:     (E11.42) Type 2 diabetes mellitus with diabetic polyneuropathy, without long-term current use of insulin (H)  (primary encounter diagnosis)  -currently on metformin, ace and zocor, as well as asa  -- A1C was 8.4 today   -discussed option of starting new medications- in particular discussed sulfonyluria, actos or " GLP-1 inhibitor.    -pt wishes to work on diet and exercise x 3 moths, if a1c not at goal of below 8 would consider GLP 1 inhibitor   -- will have patient check blood sugars 1x per day;   -- advised diet and exercise ;offered appt with dm education but she refused  -eye exam and foot exam utd      (I12.9,  N18.1) Benign hypertension with CKD (chronic kidney disease) stage I  -- BP in clinic was 138/78  -- advised to watch salt in diet   -monitor bp at home  -on ace  -renal function normal on labs 4/2017    (E78.5) Hyperlipidemia LDL goal <70  -on statin, lipids at goal on check 4/2017  -- advised regular diet and exercise       (Z23) Need for influenza vaccination  -- vaccine administered today   Plan: FLU VACCINE, INCREASED ANTIGEN, PRESV FREE             Follow up January  or as needed     The information in this document, created by the medical scribe for me, accurately reflects the services I personally performed and the decisions made by me. I have reviewed and approved this document for accuracy prior to leaving the patient care area.  Niesha Dietrcih MD  [facility]

## 2017-10-10 NOTE — NURSING NOTE
"Chief Complaint   Patient presents with     Diabetes       Initial /78 (BP Location: Right arm, Patient Position: Sitting, Cuff Size: Adult Regular)  Pulse 91  Temp 95.4  F (35.2  C) (Tympanic)  Ht 5' 4\" (1.626 m)  Wt 213 lb 3.2 oz (96.7 kg)  SpO2 98%  BMI 36.6 kg/m2 Estimated body mass index is 36.6 kg/(m^2) as calculated from the following:    Height as of this encounter: 5' 4\" (1.626 m).    Weight as of this encounter: 213 lb 3.2 oz (96.7 kg).  Medication Reconciliation: complete     Lynette Caal      "

## 2017-10-10 NOTE — MR AVS SNAPSHOT
After Visit Summary   10/10/2017    Debi Mo    MRN: [id number]           Patient Information     Date Of Birth          1945        Visit Information        Provider Department      10/10/2017 11:00 AM Niesha Dietrich MD [facility]        Today's Diagnoses     Type 2 diabetes mellitus with diabetic polyneuropathy, without long-term current use of insulin (H)    -  1    Benign hypertension with CKD (chronic kidney disease) stage I        Hyperlipidemia LDL goal <70        Need for influenza vaccination          Care Instructions     I would like you to check sugars at home once a day for 3 months. If the A1C is not under 8 then we will have to start on more medication.     Plan on how you will exercise when it gets cold and snowy outside. Know what you will do.     Work on decreasing salt in diet.     Check the bottom of your feet every night.     Appointment scheduled January 10th at 11:00 AM; Lab appointment January 10th at 10:40 AM.           Follow-ups after your visit        Your next 10 appointments already scheduled     Vladimir 10, 2018 10:40 AM CST   LAB with EA LAB   [facility] ([facility]    [address] [address] [phone]           Patient must bring picture ID. Patient should be prepared to give a urine specimen  Please do not eat 10-12 hours before your appointment if you are coming in fasting for labs on lipids, cholesterol, or glucose (sugar). Pregnant women should follow their Care Team instructions. Water with medications is okay. Do not drink coffee or other fluids. If you have concerns about taking  your medications, please ask at office or if scheduling via WeddingLovely, send a message by clicking on Secure Messaging, Message Your Care Team.            Vladimir 10, 2018 11:00 AM CST   Office Visit with Niesha Dietrich MD   [facility] ([facility]    [address]  [address]  [place] [id number] [phone]           Bring a current list of meds and any records pertaining to this visit. For Physicals, please bring immunization records and any forms needing to be filled out. Please arrive 10 minutes early to complete paperwork.              Future tests that were ordered for you today     Open Future Orders        Priority Expected Expires Ordered    HEMOGLOBIN A1C Routine  2/10/2018 10/10/2017            Who to contact     If you have questions or need follow up information about today's clinic visit or your schedule please contact [facility] directly at [phone].  Normal or non-critical lab and imaging results will be communicated to you by Tudouhart, letter or phone within 4 business days after the clinic has received the results. If you do not hear from us within 7 days, please contact the clinic through Lotarist or phone. If you have a critical or abnormal lab result, we will notify you by phone as soon as possible.  Submit refill requests through Karmasphere or call your pharmacy and they will forward the refill request to us. Please allow 3 business days for your refill to be completed.          Additional Information About Your Visit        Tudouhart Information     Karm[employer]here gives you secure access to your electronic health record. If you see a primary care provider, you can also send messages to your care team and make appointments. If you have questions, please call your primary care clinic.  If you do not have a primary care provider, please call [phone] and they will assist you.        Care EveryWhere ID     This is your Care EveryWhere ID. This could be used by other organizations to access your [facility] medical records  [id number]        Your Vitals Were     Pulse Temperature Height Pulse Oximetry BMI (Body Mass Index)       91 95.4  F (35.2  C) (Tympanic) 5' 4\" (1.626 m) 98% 36.6 kg/m2        Blood Pressure from Last 3 " Encounters:   10/10/17 138/78   08/25/17 110/72   08/08/17 132/80    Weight from Last 3 Encounters:   10/10/17 213 lb 3.2 oz (96.7 kg)   08/25/17 215 lb (97.5 kg)   08/08/17 215 lb (97.5 kg)              We Performed the Following     FLU VACCINE, INCREASED ANTIGEN, PRESV FREE          Where to get your medicines      These medications were sent to [place] [id number] [facility] - [place], [place] - [address]  [address] [address]     Phone:  [phone]     metFORMIN 500 MG tablet          Primary Care Provider    None Specified       No primary provider on file.        Equal Access to Services     [facility] : Beryl Wiseman, romy chang, yon breen, laura talley. So [facility] [phone].    ATENCIÓN: Si habla español, tiene a grissom disposición servicios gratuitos de asistencia lingüística. Llame al [phone].    We comply with applicable federal civil rights laws and [place] laws. We do not discriminate on the basis of race, color, national origin, age, disability, sex, sexual orientation, or gender identity.            Thank you!     Thank you for choosing [facility]  for your care. Our goal is always to provide you with excellent care. Hearing back from our patients is one way we can continue to improve our services. Please take a few minutes to complete the written survey that you may receive in the mail after your visit with us. Thank you!             Your Updated Medication List - Protect others around you: Learn how to safely use, store and throw away your medicines at www.disposemymeds.org.          This list is accurate as of: 10/10/17 11:47 AM.  Always use your most recent med list.                   Brand Name Dispense Instructions for use Diagnosis    aspirin 81 MG tablet      Take 81 mg by mouth daily        blood glucose monitoring lancets     100 each    Use to test blood sugars 1 times daily or as directed.     Type 2 diabetes mellitus with diabetic polyneuropathy, without long-term current use of insulin (H)       blood glucose monitoring meter device kit     1 kit    Use to test blood sugars 1 times daily or as directed.    Type 2 diabetes, controlled, with peripheral neuropathy (H)       blood glucose monitoring test strip    ONE TOUCH VERIO IQ    50 each    Use to test blood sugars 1 times daily or as directed.    Type 2 diabetes mellitus with diabetic polyneuropathy, without long-term current use of insulin (H)       CO Q 10 PO      Take 100 mg by mouth daily        cyanocobalamin 1000 MCG tablet    vitamin  B-12    100 tablet    Take 1 tablet by mouth daily.    Vitamin B12 deficiency (non anaemic)       lisinopril 40 MG tablet    PRINIVIL/ZESTRIL    90 tablet    Take 1 tablet (40 mg) by mouth daily    Microalbuminuria       metFORMIN 500 MG tablet    GLUCOPHAGE    360 tablet    Take 2 tablets (1,000 mg) by mouth 2 times daily (with meals)    Type 2 diabetes mellitus with diabetic polyneuropathy, without long-term current use of insulin (H)       potassium citrate 10 MEQ (1080 MG) CR tablet    UROCIT-K    270 tablet    Take 1 tablet (10 mEq) by mouth 3 times daily (with meals)    Calculus of kidney       simvastatin 20 MG tablet    ZOCOR    90 tablet    Take 1 tablet (20 mg) by mouth At Bedtime    Hyperlipidemia LDL goal <70       VITAMIN D3 PO      Take 4,000 Units by mouth daily.

## 2017-10-16 ENCOUNTER — TELEPHONE (OUTPATIENT)
Dept: PEDIATRICS | Facility: CLINIC | Age: 72
End: 2017-10-16

## 2017-10-16 NOTE — TELEPHONE ENCOUNTER
Reason for Call:  Form, our goal is to have forms completed with 72 hours, however, some forms may require a visit or additional information.    Type of letter, form or note:  $25.00    Who is the form from?: Patient    Where did the form come from: Patient or family brought in       What clinic location was the form placed at?: [place]    Where the form was placed: 's Box    What number is listed as a contact on the form?: [phone]       Additional comments: please mail    Call taken on 10/16/2017 at 1:00 PM by Thu Hanson

## 2017-10-30 DIAGNOSIS — R80.9 MICROALBUMINURIA: ICD-10-CM

## 2017-11-02 RX ORDER — LISINOPRIL 40 MG/1
TABLET ORAL
Qty: 90 TABLET | Refills: 1 | Status: SHIPPED | OUTPATIENT
Start: 2017-11-02 | End: 2018-04-24

## 2017-11-15 DIAGNOSIS — E78.5 HYPERLIPIDEMIA LDL GOAL <70: ICD-10-CM

## 2017-11-20 RX ORDER — SIMVASTATIN 20 MG
TABLET ORAL
Qty: 90 TABLET | Refills: 1 | Status: SHIPPED | OUTPATIENT
Start: 2017-11-20 | End: 2018-04-24

## 2017-11-20 NOTE — TELEPHONE ENCOUNTER
Requested Prescriptions   Pending Prescriptions Disp Refills     simvastatin (ZOCOR) 20 MG tablet [Pharmacy Med Name: SIMVASTATIN TABS 20MG] 90 tablet 2     Sig: TAKE 1 TABLET AT BEDTIME    Statins Protocol Passed    11/15/2017  8:21 PM       Passed - LDL on file in past 12 months    Recent Labs   Lab Test  04/06/17   0928   LDL  34            Passed - No abnormal creatine kinase in past 12 months    No lab results found.         Passed - Recent or future visit with authorizing provider    Patient had office visit in the last year or has a visit in the next 30 days with authorizing provider.  See chart review.              Passed - Patient is age 18 or older       Passed - No active pregnancy on record       Passed - No positive pregnancy test in past 12 months

## 2017-11-20 NOTE — TELEPHONE ENCOUNTER
Prescription approved per [facility] Refill Protocol.    Ratna Young RN -- [facility]

## 2018-01-10 ENCOUNTER — OFFICE VISIT (OUTPATIENT)
Dept: PEDIATRICS | Facility: CLINIC | Age: 73
End: 2018-01-10
Payer: COMMERCIAL

## 2018-01-10 VITALS
HEART RATE: 89 BPM | RESPIRATION RATE: 14 BRPM | HEIGHT: 63 IN | OXYGEN SATURATION: 97 % | DIASTOLIC BLOOD PRESSURE: 62 MMHG | WEIGHT: 203.5 LBS | SYSTOLIC BLOOD PRESSURE: 118 MMHG | BODY MASS INDEX: 36.06 KG/M2

## 2018-01-10 DIAGNOSIS — E78.5 HYPERLIPIDEMIA LDL GOAL <70: ICD-10-CM

## 2018-01-10 DIAGNOSIS — I12.9 BENIGN HYPERTENSION WITH CKD (CHRONIC KIDNEY DISEASE) STAGE I: ICD-10-CM

## 2018-01-10 DIAGNOSIS — E11.42 TYPE 2 DIABETES MELLITUS WITH DIABETIC POLYNEUROPATHY, WITHOUT LONG-TERM CURRENT USE OF INSULIN (H): ICD-10-CM

## 2018-01-10 DIAGNOSIS — E11.42 TYPE 2 DIABETES MELLITUS WITH DIABETIC POLYNEUROPATHY, WITHOUT LONG-TERM CURRENT USE OF INSULIN (H): Primary | ICD-10-CM

## 2018-01-10 DIAGNOSIS — N18.1 BENIGN HYPERTENSION WITH CKD (CHRONIC KIDNEY DISEASE) STAGE I: ICD-10-CM

## 2018-01-10 LAB — HBA1C MFR BLD: 6.9 % (ref 4.3–6)

## 2018-01-10 PROCEDURE — 83036 HEMOGLOBIN GLYCOSYLATED A1C: CPT | Performed by: INTERNAL MEDICINE

## 2018-01-10 PROCEDURE — 36415 COLL VENOUS BLD VENIPUNCTURE: CPT | Performed by: INTERNAL MEDICINE

## 2018-01-10 PROCEDURE — 99214 OFFICE O/P EST MOD 30 MIN: CPT | Performed by: INTERNAL MEDICINE

## 2018-01-10 RX ORDER — LORATADINE 10 MG/1
10 TABLET ORAL DAILY
COMMUNITY
End: 2018-04-24

## 2018-01-10 NOTE — MR AVS SNAPSHOT
After Visit Summary   1/10/2018    Debi Mo    MRN: [id number]           Patient Information     Date Of Birth          1945        Visit Information        Provider Department      1/10/2018 11:00 AM Niesha Dietrich MD [facility]        Today's Diagnoses     Type 2 diabetes mellitus with diabetic polyneuropathy, without long-term current use of insulin (H)    -  1    Benign hypertension with CKD (chronic kidney disease) stage I        Hyperlipidemia LDL goal <70          Care Instructions    Get a stationary bike, elliptical or stair stepper for exercise when it is cold outside and make a plan.     Think about putting the equipment in a place where you will use it.     If your blood pressure is really low then don't take your blood pressure medication or 1/2 a pill for a day.     If you are consistently running in the 90's then let me know.     Follow up on April 24th at 10:00 AM.     Fasting lab appointment on April 20th at 10:00 AM.             Follow-ups after your visit        Your next 10 appointments already scheduled     Apr 20, 2018 10:00 AM CDT   LAB with EA LAB   [facility]    [address] [place] [address]  [address]  [facility] [address]   [phone]           Please do not eat 10-12 hours before your appointment if you are coming in fasting for labs on lipids, cholesterol, or glucose (sugar). This does not apply to pregnant women. Water, hot tea and black coffee (with nothing added) are okay. Do not drink other fluids, diet soda or chew gum.            Apr 24, 2018 10:00 AM CDT   PHYSICAL with Niesha Dietrich MD   [facility]    [address] [place] [address]  [address]  [facility] [address]   [phone]              Future tests that were ordered for you today     Open Future Orders        Priority Expected Expires Ordered    Hemoglobin A1c Routine  5/10/2018 1/10/2018  "   Lipid panel reflex to direct LDL Fasting Routine  5/10/2018 1/10/2018    Comprehensive metabolic panel Routine  5/10/2018 1/10/2018    Albumin Random Urine Quantitative with Creat Ratio Routine  5/10/2018 1/10/2018            Who to contact     If you have questions or need follow up information about today's clinic visit or your schedule please contact [facility] [place] directly at [phone].  Normal or non-critical lab and imaging results will be communicated to you by "[employer]hart, letter or phone within 4 business days after the clinic has received the results. If you do not hear from us within 7 days, please contact the clinic through Affibody or phone. If you have a critical or abnormal lab result, we will notify you by phone as soon as possible.  Submit refill requests through Affibody or call your pharmacy and they will forward the refill request to us. Please allow 3 business days for your refill to be completed.          Additional Information About Your Visit        "[employer]harClearCycle Information     Aff[employer]ody gives you secure access to your electronic health record. If you see a primary care provider, you can also send messages to your care team and make appointments. If you have questions, please call your primary care clinic.  If you do not have a primary care provider, please call [phone] and they will assist you.        Care EveryWhere ID     This is your Care EveryWhere ID. This could be used by other organizations to access your [facility] medical records  [id number]        Your Vitals Were     Pulse Respirations Height Pulse Oximetry BMI (Body Mass Index)       89 14 5' 3\" (1.6 m) 97% 36.05 kg/m2        Blood Pressure from Last 3 Encounters:   01/10/18 118/62   10/10/17 138/78   08/25/17 110/72    Weight from Last 3 Encounters:   01/10/18 203 lb 8 oz (92.3 kg)   10/10/17 213 lb 3.2 oz (96.7 kg)   08/25/17 215 lb (97.5 kg)                 Where to get your medicines      These medications were sent to " [id number] - [place], [place] - [address]  [address] [facility] [address] [place] [address]     Phone:  [phone]     metFORMIN 500 MG tablet          Primary Care Provider Office Phone # Fax #    Niesha Dietrich [phone] [address] [place] [address]        Equal Access to Services     [facility]: Hadii aad ku hadasho Soomaali, waaxda luqadaha, qaybta kaalmada adeegyada, waxay idiin hayaan adeeg kharash la'aan ah. So [facility] [phone].    ATENCIÓN: Si habla espguzman, tiene a grissom disposición servicios gratuitos de asistencia lingüística. Llame al [phone].    We comply with applicable federal civil rights laws and [place] laws. We do not discriminate on the basis of race, color, national origin, age, disability, sex, sexual orientation, or gender identity.            Thank you!     Thank you for choosing [facility]  for your care. Our goal is always to provide you with excellent care. Hearing back from our patients is one way we can continue to improve our services. Please take a few minutes to complete the written survey that you may receive in the mail after your visit with us. Thank you!             Your Updated Medication List - Protect others around you: Learn how to safely use, store and throw away your medicines at www.disposemymeds.org.          This list is accurate as of: 1/10/18 11:25 AM.  Always use your most recent med list.                   Brand Name Dispense Instructions for use Diagnosis    aspirin 81 MG tablet      Take 81 mg by mouth daily        blood glucose monitoring lancets     100 each    Use to test blood sugars 1 times daily or as directed.    Type 2 diabetes mellitus with diabetic polyneuropathy, without long-term current use of insulin (H)       blood glucose monitoring meter device kit     1 kit    Use to test blood sugars 1 times daily or as directed.    Type 2 diabetes, controlled, with peripheral neuropathy (H)        blood glucose monitoring test strip    ONETOUCH VERIO IQ    50 each    Use to test blood sugars 1 times daily or as directed.    Type 2 diabetes mellitus with diabetic polyneuropathy, without long-term current use of insulin (H)       CLARITIN 10 MG tablet   Generic drug:  loratadine      Take 10 mg by mouth daily        CO Q 10 PO      Take 100 mg by mouth daily        cyanocobalamin 1000 MCG tablet    vitamin  B-12    100 tablet    Take 1 tablet by mouth daily.    Vitamin B12 deficiency (non anaemic)       lisinopril 40 MG tablet    PRINIVIL/ZESTRIL    90 tablet    TAKE 1 TABLET DAILY    Microalbuminuria       metFORMIN 500 MG tablet    GLUCOPHAGE    360 tablet    Take 2 tablets (1,000 mg) by mouth 2 times daily (with meals)    Type 2 diabetes mellitus with diabetic polyneuropathy, without long-term current use of insulin (H)       potassium citrate 10 MEQ (1080 MG) CR tablet    UROCIT-K    270 tablet    Take 1 tablet (10 mEq) by mouth 3 times daily (with meals)    Calculus of kidney       simvastatin 20 MG tablet    ZOCOR    90 tablet    TAKE 1 TABLET AT BEDTIME    Hyperlipidemia LDL goal <70       VITAMIN D3 PO      Take 4,000 Units by mouth daily.

## 2018-01-10 NOTE — PROGRESS NOTES
SUBJECTIVE:   Debi Mo is a 72 year old female who presents to clinic today for the following health issues:    Debi presents to the clinic for a diabetes follow up. Patient reports sugars have been better and average in the 130's. A1C today was 6.9; notes she has been watching her diet and writes down everything she eats. Patient is looking for an exercise mana on her phone to do when it is cold.     Notes when she was ill with a cold her blood pressure was averaging 90's/50's but then returned to normal after 2 days. She is concerned that she is on too much blood pressure medication      Diabetes Follow-up    Patient is checking blood sugars: 2-3 times a week    Diabetic concerns: None     Symptoms of hypoglycemia (low blood sugar): none     Paresthesias (numbness or burning in feet) or sores: Yes     Date of last diabetic eye exam: March/April 2017    Hyperlipidemia Follow-Up    Rate your low fat/cholesterol diet?: not monitoring fat    Taking statin?  Yes, has been having frequent leg cramps so started taking COQ10 and that seemed to help     Other lipid medications/supplements?:  none    Hypertension Follow-up    Outpatient blood pressures are being checked at home if feeling dizzy or being really tired.  Results are 93/58, 98/53, 97/65, 98/59 when dizzy or having excessive tiredness - these numbers happened over a 3-4 day period of time.    Low Salt Diet: not monitoring salt    BP Readings from Last 2 Encounters:   10/10/17 138/78   08/25/17 110/72     Hemoglobin A1C (%)   Date Value   01/10/2018 6.9 (H)   10/10/2017 8.4 (H)     LDL Cholesterol Calculated (mg/dL)   Date Value   04/06/2017 34   04/15/2016 27     Vascular Disease Follow-up:  Coronary Artery Disease (CAD)    Chest pain or pressure, left side neck or arm pain: No    Shortness of breath/increased sweats/nausea with exertion: Yes for a while has been getting out of breath with inclines    Pain in calves walking 1-2 blocks:  No    Worsened or new symptoms since last visit: No    Nitroglycerin use: no    Daily aspirin use: Yes          Amount of exercise or physical activity: None    Problems taking medications regularly: No    Medication side effects: none  Diet: carbohydrate counting          Problem list and histories reviewed & adjusted, as indicated.  Additional history: as documented    Patient Active Problem List   Diagnosis     Seasonal allergic rhinitis     Advance Care Planning     Vitamin B12 deficiency without anemia     High triglycerides     Benign hypertension with CKD (chronic kidney disease) stage I     History of nephrolithiasis     Serum calcium elevated     Hyperlipidemia LDL goal <70     Peripheral neuropathy     Health Care Home     LBBB (left bundle branch block)     Vitamin D deficiency     Microalbuminuria     Coronary artery disease involving native coronary artery without angina pectoris     Morbid obesity due to excess calories (H)     Type 2 diabetes mellitus with diabetic polyneuropathy, without long-term current use of insulin (H)     Past Surgical History:   Procedure Laterality Date     CARDIAC SURGERY      ANGIOGRAM     CHOLECYSTECTOMY       COLONOSCOPY       COMBINED CYSTOSCOPY, RETROGRADES, URETEROSCOPY, LASER HOLMIUM LITHOTRIPSY URETER(S), INSERT STENT Bilateral 9/28/2016    Procedure: COMBINED CYSTOSCOPY, RETROGRADES, URETEROSCOPY, LASER HOLMIUM LITHOTRIPSY URETER(S), INSERT STENT;  Surgeon: Lester Orona MD;  Location:  OR     CYSTOSCOPY       CYSTOSCOPY, RETROGRADES, COMBINED  4/17/2013    Procedure: COMBINED CYSTOSCOPY, RETROGRADES;;  Surgeon: Lester Orona MD;  Location:  OR     CYSTOSCOPY, URETEROSCOPY, COMBINED  11/12/2013    Procedure: COMBINED CYSTOSCOPY, URETEROSCOPY;  CYSTOSCOPY, LEFT URETEROSCOPY, LEFT EXTRACORPOREAL SHOCKWAVE LITHOTRIPSY  ;  Surgeon: Lester Orona MD;  Location:  OR     EXTRACORPOREAL SHOCK WAVE LITHOTRIPSY (ESWL)  11/12/2013     Procedure: EXTRACORPOREAL SHOCK WAVE LITHOTRIPSY (ESWL);;  Surgeon: Lester Orona MD;  Location: [facility] OR     EXTRACORPOREAL SHOCK WAVE LITHOTRIPSY (ESWL) Bilateral 4/28/2015    Procedure: EXTRACORPOREAL SHOCK WAVE LITHOTRIPSY (ESWL);  Surgeon: Lester Orona MD;  Location: [facility] OR     EXTRACORPOREAL SHOCK WAVE LITHOTRIPSY, CYSTOSCOPY, INSERT STENT URETER(S), COMBINED  1/10/2012    Procedure:COMBINED EXTRACORPOREAL SHOCK WAVE LITHOTRIPSY, CYSTOSCOPY, INSERT STENT URETER(S); LEFT EXTRACORPOREAL SHOCKWAVE LITHOTRIPSY, LEFT STENT; Surgeon:LESTER ORONA; Location:[facility] OR     GALLBLADDER SURGERY  2009     HYSTERECTOMY VAGINAL  1993    left  the ovaries     LASER HOLMIUM LITHOTRIPSY URETER(S), INSERT STENT, COMBINED  4/17/2013    Procedure: COMBINED CYSTOSCOPY, URETEROSCOPY, LASER HOLMIUM LITHOTRIPSY URETER(S), INSERT STENT;  CYSTOSCOPY, Right URETEROSCOPY, LASER HOLMIUM LITHOTRIPSY URETER(S) standby only,Stone basketing, Right Retrogrades, ;  Surgeon: Lester Orona MD;  Location: [facility] OR     SHOULDER SURGERY  2009    left rotator cuff       Social History   Substance Use Topics     Smoking status: Never Smoker     Smokeless tobacco: Never Used     Alcohol use No     Family History   Problem Relation Age of Onset     Respiratory Mother      CHF     Alzheimer Disease Mother      Dementia     HEART DISEASE Father      Heart Attack     Respiratory Father      CHF     Breast Cancer Maternal Grandmother      Breast Cancer Paternal Grandmother      Arthritis Brother      Arthritis Sister      Hypertension Son      DIABETES Daughter      Arthritis Sister      Arthritis Sister          Current Outpatient Prescriptions   Medication Sig Dispense Refill     loratadine (CLARITIN) 10 MG tablet Take 10 mg by mouth daily       simvastatin (ZOCOR) 20 MG tablet TAKE 1 TABLET AT BEDTIME 90 tablet 1     lisinopril (PRINIVIL/ZESTRIL) 40 MG tablet TAKE 1 TABLET DAILY 90 tablet 1     metFORMIN (GLUCOPHAGE) 500 MG  tablet Take 2 tablets (1,000 mg) by mouth 2 times daily (with meals) 360 tablet 0     potassium citrate (UROCIT-K) 10 MEQ (1080 MG) CR tablet Take 1 tablet (10 mEq) by mouth 3 times daily (with meals) 270 tablet 1     aspirin 81 MG tablet Take 81 mg by mouth daily       blood glucose monitoring (ONE TOUCH DELICA) lancets Use to test blood sugars 1 times daily or as directed. 100 each 11     blood glucose monitoring (ONE TOUCH VERIO IQ) test strip Use to test blood sugars 1 times daily or as directed. 50 each 11     Coenzyme Q10 (CO Q 10 PO) Take 100 mg by mouth daily        blood glucose monitoring (ONETOUCH VERIO) meter device kit Use to test blood sugars 1 times daily or as directed. 1 kit 0     Cholecalciferol (VITAMIN D3 PO) Take 4,000 Units by mouth daily.       cyanocolbalamin (VITAMIN  B-12) 1000 MCG tablet Take 1 tablet by mouth daily. 100 tablet 3     Allergies   Allergen Reactions     Dust Mites      Seasonal Allergies      Recent Labs   Lab Test  01/10/18   1038  10/10/17   1033  08/10/17   1551  07/13/17   1002  04/06/17   0928  10/12/16   1038   04/15/16   1020   10/15/15   1114   04/23/15   0400   04/10/15   0910  10/14/14   0940   A1C  6.9*  8.4*   --   7.5*  7.8*  7.1*   < >  7.6*   --    --    < >   --    --   6.5*  6.3*   LDL   --    --    --    --   34   --    --   27   --    --    --    --    --   26   --    HDL   --    --    --    --   34*   --    --   33*   --    --    --    --    --   35*   --    TRIG   --    --    --    --   314*   --    --   331*   --   367*   < >   --    --   382*   --    ALT   --    --    --    --   45   --    --    --    --    --    --   29   --    --   19   CR   --    --    --    --   0.88  0.83   --    --    < >   --    --   0.75   < >   --   0.80   GFRESTIMATED   --    --   71   --   63  68   --    --    < >   --    --   77   < >   --   72   GFRESTBLACK   --    --   85   --   77  82   --    --    < >   --    --   >90   GFR Calc     < >   --   87  "  POTASSIUM   --    --    --    --   4.7  4.4   < >   --    < >   --    --   4.1   < >   --   4.2   TSH   --    --    --    --    --   1.58   --    --    --    --    --    --    --    --   2.20    < > = values in this interval not displayed.      BP Readings from Last 3 Encounters:   01/10/18 118/62   10/10/17 138/78   08/25/17 110/72    Wt Readings from Last 3 Encounters:   01/10/18 92.3 kg (203 lb 8 oz)   10/10/17 96.7 kg (213 lb 3.2 oz)   08/25/17 97.5 kg (215 lb)                  Labs reviewed in EPIC          Reviewed and updated as needed this visit by clinical staffTobacco  Med Hx  Surg Hx  Fam Hx  Soc Hx      Reviewed and updated as needed this visit by Provider         ROS:  Constitutional, HEENT, cardiovascular, pulmonary, GI, , musculoskeletal, neuro, skin, endocrine and psych systems are negative, except as otherwise noted.    This document serves as a record of the services and decisions personally performed and made by Niesha Dietrich MD. It was created on her behalf by Norma Knowles, a trained medical scribe. The creation of this document is based the provider's statements to the medical scribe.    Norma Knowles January 10, 2018 11:05 AM  OBJECTIVE:   /62 (BP Location: Left arm, Patient Position: Chair, Cuff Size: Adult Large)  Pulse 89  Resp 14  Ht 1.6 m (5' 3\")  Wt 92.3 kg (203 lb 8 oz)  SpO2 97%  BMI 36.05 kg/m2  Body mass index is 36.05 kg/(m^2).  GENERAL: healthy, alert and no distress  HENT: ear canals and TM's normal, nose and mouth without ulcers or lesions  NECK: no adenopathy, no asymmetry, masses, or scars and thyroid normal to palpation  RESP: lungs clear to auscultation - no rales, rhonchi or wheezes  CV: regular rate and rhythm, normal S1 S2, no S3 or S4, no murmur, click or rub, no peripheral edema  ABDOMEN: soft, nontender, no hepatosplenomegaly, no masses and bowel sounds normal  MS: no gross musculoskeletal defects noted, no edema  PSYCH: mentation appears " normal, affect normal/bright  Diabetic foot exam: normal DP and PT pulses, no trophic changes or ulcerative lesions, normal sensory exam and normal monofilament exam      Diagnostic Test Results:  Results for orders placed or performed in visit on 01/10/18 (from the past 24 hour(s))   HEMOGLOBIN A1C   Result Value Ref Range    Hemoglobin A1C 6.9 (H) 4.3 - 6.0 %       ASSESSMENT/PLAN:   (E11.42) Type 2 diabetes mellitus with diabetic polyneuropathy, without long-term current use of insulin (H)  (primary encounter diagnosis)  -- A1C decreased to 6.9 today   -- encouraged continued diet and lifestyle modifications   -- recommended stationary bike for exercise during winter months   -- refill medication for extended vacation   -on metformin, ace, statin and asa  -eye exam and foot exam utd  -recheck in 3 months.   Plan: metFORMIN (GLUCOPHAGE) 500 MG tablet,         Hemoglobin A1c, Comprehensive metabolic panel,         Albumin Random Urine Quantitative with Creat         Ratio       (I12.9,  N18.1) Benign hypertension with CKD (chronic kidney disease) stage I  -- advised patient if BP is low, she can hold medication for a day; call if it is consistently under 100 systolic  -- controlled; continue current care plan   Plan: Comprehensive metabolic panel        (E78.5) Hyperlipidemia LDL goal <70  -due for labs prior to next visit  Plan: Lipid panel reflex to direct LDL Fasting         Follow up on April 24th or as needed     The information in this document, created by the medical scribe for me, accurately reflects the services I personally performed and the decisions made by me. I have reviewed and approved this document for accuracy prior to leaving the patient care area.  Niesha Dietrich MD  [facility]

## 2018-01-10 NOTE — PATIENT INSTRUCTIONS
Get a stationary bike, elliptical or stair stepper for exercise when it is cold outside and make a plan.     Think about putting the equipment in a place where you will use it.     If your blood pressure is really low then don't take your blood pressure medication or 1/2 a pill for a day.     If you are consistently running in the 90's then let me know.     Follow up on April 24th at 10:00 AM.     Fasting lab appointment on April 20th at 10:00 AM.

## 2018-01-10 NOTE — NURSING NOTE
"Chief Complaint   Patient presents with     Lipids     Diabetes     Coronary Artery Disease     Hypertension       Initial /62 (BP Location: Left arm, Patient Position: Chair, Cuff Size: Adult Large)  Pulse 89  Resp 14  Ht 5' 3\" (1.6 m)  Wt 203 lb 8 oz (92.3 kg)  SpO2 97%  BMI 36.05 kg/m2 Estimated body mass index is 36.05 kg/(m^2) as calculated from the following:    Height as of this encounter: 5' 3\" (1.6 m).    Weight as of this encounter: 203 lb 8 oz (92.3 kg).  Medication Reconciliation: complete  April Shirley CMA  "

## 2018-04-20 DIAGNOSIS — E11.42 TYPE 2 DIABETES MELLITUS WITH DIABETIC POLYNEUROPATHY, WITHOUT LONG-TERM CURRENT USE OF INSULIN (H): ICD-10-CM

## 2018-04-20 DIAGNOSIS — N18.1 BENIGN HYPERTENSION WITH CKD (CHRONIC KIDNEY DISEASE) STAGE I: ICD-10-CM

## 2018-04-20 DIAGNOSIS — E78.5 HYPERLIPIDEMIA LDL GOAL <70: ICD-10-CM

## 2018-04-20 DIAGNOSIS — I12.9 BENIGN HYPERTENSION WITH CKD (CHRONIC KIDNEY DISEASE) STAGE I: ICD-10-CM

## 2018-04-20 LAB
ALBUMIN SERPL-MCNC: 3.3 G/DL (ref 3.4–5)
ALP SERPL-CCNC: 84 U/L (ref 40–150)
ALT SERPL W P-5'-P-CCNC: 17 U/L (ref 0–50)
ANION GAP SERPL CALCULATED.3IONS-SCNC: 10 MMOL/L (ref 3–14)
AST SERPL W P-5'-P-CCNC: 21 U/L (ref 0–45)
BILIRUB SERPL-MCNC: 0.5 MG/DL (ref 0.2–1.3)
BUN SERPL-MCNC: 15 MG/DL (ref 7–30)
CALCIUM SERPL-MCNC: 10 MG/DL (ref 8.5–10.1)
CHLORIDE SERPL-SCNC: 111 MMOL/L (ref 94–109)
CHOLEST SERPL-MCNC: 120 MG/DL
CO2 SERPL-SCNC: 20 MMOL/L (ref 20–32)
CREAT SERPL-MCNC: 0.8 MG/DL (ref 0.52–1.04)
CREAT UR-MCNC: 164 MG/DL
GFR SERPL CREATININE-BSD FRML MDRD: 71 ML/MIN/1.7M2
GLUCOSE SERPL-MCNC: 166 MG/DL (ref 70–99)
HBA1C MFR BLD: 7 % (ref 0–5.6)
HDLC SERPL-MCNC: 28 MG/DL
LDLC SERPL CALC-MCNC: 42 MG/DL
MICROALBUMIN UR-MCNC: 19 MG/L
MICROALBUMIN/CREAT UR: 11.77 MG/G CR (ref 0–25)
NONHDLC SERPL-MCNC: 92 MG/DL
POTASSIUM SERPL-SCNC: 4.4 MMOL/L (ref 3.4–5.3)
PROT SERPL-MCNC: 7.1 G/DL (ref 6.8–8.8)
SODIUM SERPL-SCNC: 141 MMOL/L (ref 133–144)
TRIGL SERPL-MCNC: 252 MG/DL

## 2018-04-20 PROCEDURE — 80061 LIPID PANEL: CPT | Performed by: INTERNAL MEDICINE

## 2018-04-20 PROCEDURE — 36415 COLL VENOUS BLD VENIPUNCTURE: CPT | Performed by: INTERNAL MEDICINE

## 2018-04-20 PROCEDURE — 80053 COMPREHEN METABOLIC PANEL: CPT | Performed by: INTERNAL MEDICINE

## 2018-04-20 PROCEDURE — 83036 HEMOGLOBIN GLYCOSYLATED A1C: CPT | Performed by: INTERNAL MEDICINE

## 2018-04-20 PROCEDURE — 82043 UR ALBUMIN QUANTITATIVE: CPT | Performed by: INTERNAL MEDICINE

## 2018-04-24 ENCOUNTER — OFFICE VISIT (OUTPATIENT)
Dept: PEDIATRICS | Facility: CLINIC | Age: 73
End: 2018-04-24
Payer: COMMERCIAL

## 2018-04-24 VITALS
OXYGEN SATURATION: 97 % | HEIGHT: 63 IN | SYSTOLIC BLOOD PRESSURE: 122 MMHG | DIASTOLIC BLOOD PRESSURE: 64 MMHG | TEMPERATURE: 97.4 F | HEART RATE: 102 BPM | WEIGHT: 202.4 LBS | BODY MASS INDEX: 35.86 KG/M2

## 2018-04-24 DIAGNOSIS — E11.42 TYPE 2 DIABETES MELLITUS WITH DIABETIC POLYNEUROPATHY, WITHOUT LONG-TERM CURRENT USE OF INSULIN (H): ICD-10-CM

## 2018-04-24 DIAGNOSIS — E78.5 HYPERLIPIDEMIA LDL GOAL <70: ICD-10-CM

## 2018-04-24 DIAGNOSIS — Z12.31 ENCOUNTER FOR SCREENING MAMMOGRAM FOR BREAST CANCER: ICD-10-CM

## 2018-04-24 DIAGNOSIS — I25.10 CORONARY ARTERY DISEASE INVOLVING NATIVE CORONARY ARTERY OF NATIVE HEART WITHOUT ANGINA PECTORIS: ICD-10-CM

## 2018-04-24 DIAGNOSIS — Z00.00 ROUTINE GENERAL MEDICAL EXAMINATION AT A HEALTH CARE FACILITY: Primary | ICD-10-CM

## 2018-04-24 DIAGNOSIS — I10 BENIGN ESSENTIAL HYPERTENSION: ICD-10-CM

## 2018-04-24 DIAGNOSIS — E53.8 VITAMIN B12 DEFICIENCY WITHOUT ANEMIA: ICD-10-CM

## 2018-04-24 DIAGNOSIS — J30.1 CHRONIC SEASONAL ALLERGIC RHINITIS DUE TO POLLEN: ICD-10-CM

## 2018-04-24 DIAGNOSIS — E66.01 MORBID OBESITY DUE TO EXCESS CALORIES (H): ICD-10-CM

## 2018-04-24 PROCEDURE — 99397 PER PM REEVAL EST PAT 65+ YR: CPT | Performed by: INTERNAL MEDICINE

## 2018-04-24 PROCEDURE — 99213 OFFICE O/P EST LOW 20 MIN: CPT | Mod: 25 | Performed by: INTERNAL MEDICINE

## 2018-04-24 RX ORDER — LORATADINE 10 MG/1
10 TABLET ORAL DAILY
Qty: 90 TABLET | Refills: 3 | Status: SHIPPED | OUTPATIENT
Start: 2018-04-24 | End: 2019-04-24

## 2018-04-24 RX ORDER — LISINOPRIL 40 MG/1
40 TABLET ORAL DAILY
Qty: 90 TABLET | Refills: 3 | Status: SHIPPED | OUTPATIENT
Start: 2018-04-24 | End: 2019-04-24

## 2018-04-24 RX ORDER — SIMVASTATIN 20 MG
20 TABLET ORAL AT BEDTIME
Qty: 90 TABLET | Refills: 3 | Status: SHIPPED | OUTPATIENT
Start: 2018-04-24 | End: 2019-04-24

## 2018-04-24 RX ORDER — LANOLIN ALCOHOL/MO/W.PET/CERES
1000 CREAM (GRAM) TOPICAL DAILY
Qty: 100 TABLET | Refills: 3 | Status: SHIPPED | OUTPATIENT
Start: 2018-04-24 | End: 2019-04-24

## 2018-04-24 RX ORDER — POTASSIUM CITRATE 10 MEQ/1
10 TABLET, EXTENDED RELEASE ORAL
Qty: 270 TABLET | Refills: 1 | Status: CANCELLED | OUTPATIENT
Start: 2018-04-24

## 2018-04-24 ASSESSMENT — ENCOUNTER SYMPTOMS
DIARRHEA: 0
NAUSEA: 0
ABDOMINAL PAIN: 0
HEADACHES: 0
SORE THROAT: 0
PALPITATIONS: 0
CONSTIPATION: 0
DIZZINESS: 0
MYALGIAS: 0
ARTHRALGIAS: 0
JOINT SWELLING: 0
PARESTHESIAS: 0
HEARTBURN: 0
FEVER: 0
SHORTNESS OF BREATH: 0
HEMATOCHEZIA: 0
DYSURIA: 0
FREQUENCY: 0
COUGH: 0
HEMATURIA: 0
WEAKNESS: 0
NERVOUS/ANXIOUS: 0
CHILLS: 0
EYE PAIN: 0

## 2018-04-24 ASSESSMENT — ACTIVITIES OF DAILY LIVING (ADL)
CURRENT_FUNCTION: NO ASSISTANCE NEEDED
I_NEED_ASSISTANCE_FOR_THE_FOLLOWING_DAILY_ACTIVITIES:: NO ASSISTANCE IS NEEDED

## 2018-04-24 NOTE — MR AVS SNAPSHOT
After Visit Summary   4/24/2018    Debi Mo    MRN: [id number]           Patient Information     Date Of Birth          1945        Visit Information        Provider Department      4/24/2018 10:00 AM Niesha Dietrich MD [facility]        Today's Diagnoses     Routine general medical examination at a health care facility    -  1    Type 2 diabetes mellitus with diabetic polyneuropathy, without long-term current use of insulin (H)        Benign essential hypertension        Coronary artery disease involving native coronary artery of native heart without angina pectoris        Hyperlipidemia LDL goal <70        Vitamin B12 deficiency without anemia        Morbid obesity due to excess calories (H)        Chronic seasonal allergic rhinitis due to pollen        Encounter for screening mammogram for breast cancer          Care Instructions      Preventive Health Recommendations    Female Ages 65 +    Schedule eye examination.     Get shingles vaccination at pharmacy.     Yearly exam:     See your health care provider every year in order to  o Review health changes.   o Discuss preventive care.    o Review your medicines if your doctor has prescribed any.      You no longer need a yearly Pap test unless you've had an abnormal Pap test in the past 10 years. If you have vaginal symptoms, such as bleeding or discharge, be sure to talk with your provider about a Pap test.      Every 1 to 2 years, have a mammogram.  If you are over 69, talk with your health care provider about whether or not you want to continue having screening mammograms.      Every 10 years, have a colonoscopy. Or, have a yearly FIT test (stool test). These exams will check for colon cancer.       Have a cholesterol test every 5 years, or more often if your doctor advises it.       Have a diabetes test (fasting glucose) every three years. If you are at risk for diabetes, you should have this test more often.        At age 65, have a bone density scan (DEXA) to check for osteoporosis (brittle bone disease).    Shots:    Get a flu shot each year.    Get a tetanus shot every 10 years.    Talk to your doctor about your pneumonia vaccines. There are now two you should receive - Pneumovax (PPSV 23) and Prevnar (PCV 13).    Talk to your doctor about the shingles vaccine.    Talk to your doctor about the hepatitis B vaccine.    Nutrition:     Eat at least 5 servings of fruits and vegetables each day.      Eat whole-grain bread, whole-wheat pasta and brown rice instead of white grains and rice.      Talk to your provider about Calcium and Vitamin D.     Lifestyle    Exercise at least 150 minutes a week (30 minutes a day, 5 days a week). This will help you control your weight and prevent disease.      Limit alcohol to one drink per day.      No smoking.       Wear sunscreen to prevent skin cancer.       See your dentist twice a year for an exam and cleaning.      See your eye doctor every 1 to 2 years to screen for conditions such as glaucoma, macular degeneration and cataracts.          Follow-ups after your visit        Follow-up notes from your care team     Return in about 6 months (around 10/23/2018) for Diabetes Follow-up with A1c 10 min prior.      Your next 10 appointments already scheduled     Oct 23, 2018  9:40 AM CDT   LAB with [facility] LAB   [facility])    [address] [place] [id number] [phone]           Please do not eat 10-12 hours before your appointment if you are coming in fasting for labs on lipids, cholesterol, or glucose (sugar). This does not apply to pregnant women. Water, hot tea and black coffee (with nothing added) are okay. Do not drink other fluids, diet soda or chew gum.            Oct 23, 2018 10:00 AM CDT   Office Visit with Niesha Dietrich MD   [facility])    [address] [place] [address]  Drive  [address]  [facility] [address] [phone]           Bring a current list of meds and any records pertaining to this visit. For Physicals, please bring immunization records and any forms needing to be filled out. Please arrive 10 minutes early to complete paperwork.              Future tests that were ordered for you today     Open Future Orders        Priority Expected Expires Ordered    Vitamin B12 Routine  11/24/2018 4/24/2018    Hemoglobin A1c Routine  11/24/2018 4/24/2018    *MA Screening Digital Bilateral Routine  4/24/2019 4/24/2018            Who to contact     If you have questions or need follow up information about today's clinic visit or your schedule please contact [facility] directly at [phone].  Normal or non-critical lab and imaging results will be communicated to you by [employer], letter or phone within 4 business days after the clinic has received the results. If you do not hear from us within 7 days, please contact the clinic through [employer] or phone. If you have a critical or abnormal lab result, we will notify you by phone as soon as possible.  Submit refill requests through [employer] or call your pharmacy and they will forward the refill request to us. Please allow 3 business days for your refill to be completed.          Additional Information About Your Visit        [employer] Information     [employer] gives you secure access to your electronic health record. If you see a primary care provider, you can also send messages to your care team and make appointments. If you have questions, please call your primary care clinic.  If you do not have a primary care provider, please call [phone] and they will assist you.        Care EveryWhere ID     This is your Care EveryWhere ID. This could be used by other organizations to access your [facility] medical records  [id number]        Your Vitals Were     Pulse Temperature Height Pulse Oximetry BMI (Body Mass Index)       102 97.4  " F (36.3  C) (Oral) 5' 3\" (1.6 m) 97% 35.85 kg/m2        Blood Pressure from Last 3 Encounters:   04/24/18 122/64   01/10/18 118/62   10/10/17 138/78    Weight from Last 3 Encounters:   04/24/18 202 lb 6.4 oz (91.8 kg)   01/10/18 203 lb 8 oz (92.3 kg)   10/10/17 213 lb 3.2 oz (96.7 kg)                 Today's Medication Changes          These changes are accurate as of 4/24/18 10:45 AM.  If you have any questions, ask your nurse or doctor.               These medicines have changed or have updated prescriptions.        Dose/Directions    lisinopril 40 MG tablet   Commonly known as:  PRINIVIL/ZESTRIL   This may have changed:  See the new instructions.   Changed by:  Niesha Dietrich MD        Dose:  40 mg   Take 1 tablet (40 mg) by mouth daily   Quantity:  90 tablet   Refills:  3       simvastatin 20 MG tablet   Commonly known as:  ZOCOR   This may have changed:  See the new instructions.   Used for:  Hyperlipidemia LDL goal <70   Changed by:  Niesha Dietrich MD        Dose:  20 mg   Take 1 tablet (20 mg) by mouth At Bedtime   Quantity:  90 tablet   Refills:  3            Where to get your medicines      These medications were sent to [place] [address] [place] [facility] - [place], [place] - [address]  [address] [place] [address]     Phone:  [phone]     cyanocobalamin 1000 MCG tablet    lisinopril 40 MG tablet    loratadine 10 MG tablet    metFORMIN 500 MG tablet    simvastatin 20 MG tablet                Primary Care Provider Office Phone # Fax #    Niesha Dietrich [phone]       [facility] [place] [address]        Equal Access to Services     [facility] MOSES AH: Beryl Wiseman, romy chang, yon kaalmada arsenioda, laura talley. So [facility] [phone].    ATENCIÓN: Si habla español, tiene a grissom disposición servicios gratuitos de asistencia lingüística. Llame al [phone].    We comply with applicable " federal civil rights laws and [place] laws. We do not discriminate on the basis of race, color, national origin, age, disability, sex, sexual orientation, or gender identity.            Thank you!     Thank you for choosing [facility] [place]  for your care. Our goal is always to provide you with excellent care. Hearing back from our patients is one way we can continue to improve our services. Please take a few minutes to complete the written survey that you may receive in the mail after your visit with us. Thank you!             Your Updated Medication List - Protect others around you: Learn how to safely use, store and throw away your medicines at www.disposemymeds.org.          This list is accurate as of 4/24/18 10:45 AM.  Always use your most recent med list.                   Brand Name Dispense Instructions for use Diagnosis    aspirin 81 MG tablet      Take 81 mg by mouth daily        blood glucose monitoring lancets     100 each    Use to test blood sugars 1 times daily or as directed.    Type 2 diabetes mellitus with diabetic polyneuropathy, without long-term current use of insulin (H)       blood glucose monitoring meter device kit     1 kit    Use to test blood sugars 1 times daily or as directed.    Type 2 diabetes, controlled, with peripheral neuropathy (H)       blood glucose monitoring test strip    ONETOUCH VERIO IQ    50 each    Use to test blood sugars 1 times daily or as directed.    Type 2 diabetes mellitus with diabetic polyneuropathy, without long-term current use of insulin (H)       CO Q 10 PO      Take 100 mg by mouth daily        cyanocobalamin 1000 MCG tablet    vitamin  B-12    100 tablet    Take 1 tablet (1,000 mcg) by mouth daily    Vitamin B12 deficiency without anemia       lisinopril 40 MG tablet    PRINIVIL/ZESTRIL    90 tablet    Take 1 tablet (40 mg) by mouth daily        loratadine 10 MG tablet    CLARITIN    90 tablet    Take 1 tablet (10 mg) by mouth daily    Chronic  seasonal allergic rhinitis due to pollen       metFORMIN 500 MG tablet    GLUCOPHAGE    360 tablet    Take 2 tablets (1,000 mg) by mouth 2 times daily (with meals)    Type 2 diabetes mellitus with diabetic polyneuropathy, without long-term current use of insulin (H)       potassium citrate 10 MEQ (1080 MG) CR tablet    UROCIT-K    270 tablet    Take 1 tablet (10 mEq) by mouth 3 times daily (with meals)    Calculus of kidney       simvastatin 20 MG tablet    ZOCOR    90 tablet    Take 1 tablet (20 mg) by mouth At Bedtime    Hyperlipidemia LDL goal <70       VITAMIN D3 PO      Take 4,000 Units by mouth daily.

## 2018-04-24 NOTE — NURSING NOTE
"Chief Complaint   Patient presents with     Physical       Initial /64 (BP Location: Right arm, Patient Position: Sitting, Cuff Size: Adult Regular)  Pulse 102  Temp 97.4  F (36.3  C) (Oral)  Ht 5' 3\" (1.6 m)  Wt 202 lb 6.4 oz (91.8 kg)  SpO2 97%  BMI 35.85 kg/m2 Estimated body mass index is 35.85 kg/(m^2) as calculated from the following:    Height as of this encounter: 5' 3\" (1.6 m).    Weight as of this encounter: 202 lb 6.4 oz (91.8 kg).  Medication Reconciliation: complete     Lynette Caal      "

## 2018-04-24 NOTE — PATIENT INSTRUCTIONS
Preventive Health Recommendations    Female Ages 65 +    Schedule eye examination.     Get shingles vaccination at pharmacy.     Yearly exam:     See your health care provider every year in order to  o Review health changes.   o Discuss preventive care.    o Review your medicines if your doctor has prescribed any.      You no longer need a yearly Pap test unless you've had an abnormal Pap test in the past 10 years. If you have vaginal symptoms, such as bleeding or discharge, be sure to talk with your provider about a Pap test.      Every 1 to 2 years, have a mammogram.  If you are over 69, talk with your health care provider about whether or not you want to continue having screening mammograms.      Every 10 years, have a colonoscopy. Or, have a yearly FIT test (stool test). These exams will check for colon cancer.       Have a cholesterol test every 5 years, or more often if your doctor advises it.       Have a diabetes test (fasting glucose) every three years. If you are at risk for diabetes, you should have this test more often.       At age 65, have a bone density scan (DEXA) to check for osteoporosis (brittle bone disease).    Shots:    Get a flu shot each year.    Get a tetanus shot every 10 years.    Talk to your doctor about your pneumonia vaccines. There are now two you should receive - Pneumovax (PPSV 23) and Prevnar (PCV 13).    Talk to your doctor about the shingles vaccine.    Talk to your doctor about the hepatitis B vaccine.    Nutrition:     Eat at least 5 servings of fruits and vegetables each day.      Eat whole-grain bread, whole-wheat pasta and brown rice instead of white grains and rice.      Talk to your provider about Calcium and Vitamin D.     Lifestyle    Exercise at least 150 minutes a week (30 minutes a day, 5 days a week). This will help you control your weight and prevent disease.      Limit alcohol to one drink per day.      No smoking.       Wear sunscreen to prevent skin cancer.        See your dentist twice a year for an exam and cleaning.      See your eye doctor every 1 to 2 years to screen for conditions such as glaucoma, macular degeneration and cataracts.

## 2018-04-24 NOTE — PROGRESS NOTES
SUBJECTIVE:   Debi Mo is a 73 year old female who presents for Preventive Visit.  Debi is a patient with a complex PMHx who presents to the clinic for her annual wellness visit. Patient reports sugars and weight have been stable. Notes she has not been checking blood sugars at home and A1c today was 7.0. Denies hypoglycemia.   Patient denies symptoms of low blood pressure. Denies side effects from medications. Denies chest pain, shortness of breath, swelling in LE.  BP in clinic was 122/64; weight was 202 lbs.     Are you in the first 12 months of your Medicare coverage?  No    Physical   Annual:     Getting at least 3 servings of Calcium per day::  NO    Bi-annual eye exam::  Yes    Dental care twice a year::  Yes    Sleep apnea or symptoms of sleep apnea::  Daytime drowsiness    Diet::  Regular (no restrictions)    Taking medications regularly::  Yes    Medication side effects::  No muscle aches and No significant flushing    Additional concerns today::  YES    Ability to successfully perform activities of daily living: no assistance needed  Home Safety:  Lack of grab bars in the bathroom  Hearing Impairment: difficulty following a conversation in a noisy restaurant or crowded room and difficulty understanding soft or whispered speech      Fall risk:  Fallen 2 or more times in the past year?: No  Any fall with injury in the past year?: No    COGNITIVE SCREEN  1) Repeat 3 items (Banana, Sunrise, Chair)    2) Clock draw: NORMAL  3) 3 item recall: Recalls 3 objects  Results: 3 items recalled: COGNITIVE IMPAIRMENT LESS LIKELY    Mini-CogTM Derek [employer]. Licensed by the author for use in [facility]; reprinted with permission ([email or web address]). All rights reserved.        Reviewed and updated as needed this visit by clinical staff         Reviewed and updated as needed this visit by Provider        Social History   Substance Use Topics     Smoking status: Never Smoker     Smokeless tobacco:  Never Used     Alcohol use No       Alcohol Use 4/24/2018   If you drink alcohol do you typically have greater than 3 drinks per day OR greater than 7 drinks per week? Not Applicable   No flowsheet data found.            Today's PHQ-2 Score:   PHQ-2 ( 1999 [employer]) 4/24/2018   Q1: Little interest or pleasure in doing things 0   Q2: Feeling down, depressed or hopeless 0   PHQ-2 Score 0   Q1: Little interest or pleasure in doing things Not at all   Q2: Feeling down, depressed or hopeless Not at all   PHQ-2 Score 0       Do you feel safe in your environment - Yes    Do you have a Health Care Directive?: Yes: Advance Directive has been received and scanned.    Current providers sharing in care for this patient include:   Patient Care Team:  Niesha Dietrich MD as PCP - General (Pediatrics)  Niesha Dietrich MD as MD (Pediatrics)    The following health maintenance items are reviewed in Epic and correct as of today:  Health Maintenance   Topic Date Due     EYE EXAM Q1 YEAR  04/21/2018     A1C Q3 MO  07/20/2018     DEXA Q5 YR  10/10/2018     TSH W/ FREE T4 REFLEX Q2 YEAR  10/12/2018     FOOT EXAM Q1 YEAR  01/10/2019     FALL RISK ASSESSMENT  01/10/2019     CREATININE Q1 YEAR  04/20/2019     LIPID MONITORING Q1 YEAR  04/20/2019     MICROALBUMIN Q1 YEAR  04/20/2019     MAMMO SCREEN Q2 YR (SYSTEM ASSIGNED)  04/20/2019     TETANUS IMMUNIZATION (SYSTEM ASSIGNED)  06/01/2019     COLON CANCER SCREEN (SYSTEM ASSIGNED)  01/01/2020     ADVANCE DIRECTIVE PLANNING Q5 YRS  12/06/2021     PNEUMOCOCCAL  Completed     INFLUENZA VACCINE  Completed     HEPATITIS C SCREENING  Completed     Labs reviewed in EPIC  BP Readings from Last 3 Encounters:   01/10/18 118/62   10/10/17 138/78   08/25/17 110/72    Wt Readings from Last 3 Encounters:   01/10/18 203 lb 8 oz (92.3 kg)   10/10/17 213 lb 3.2 oz (96.7 kg)   08/25/17 215 lb (97.5 kg)                  Patient Active Problem List   Diagnosis     Seasonal allergic rhinitis      Advance Care Planning     Vitamin B12 deficiency without anemia     High triglycerides     Benign hypertension with CKD (chronic kidney disease) stage I     History of nephrolithiasis     Serum calcium elevated     Hyperlipidemia LDL goal <70     Peripheral neuropathy     Health Care Home     LBBB (left bundle branch block)     Vitamin D deficiency     Microalbuminuria     Coronary artery disease involving native coronary artery without angina pectoris     Morbid obesity due to excess calories (H)     Type 2 diabetes mellitus with diabetic polyneuropathy, without long-term current use of insulin (H)     Past Surgical History:   Procedure Laterality Date     CARDIAC SURGERY      ANGIOGRAM     CHOLECYSTECTOMY       COLONOSCOPY       COMBINED CYSTOSCOPY, RETROGRADES, URETEROSCOPY, LASER HOLMIUM LITHOTRIPSY URETER(S), INSERT STENT Bilateral 9/28/2016    Procedure: COMBINED CYSTOSCOPY, RETROGRADES, URETEROSCOPY, LASER HOLMIUM LITHOTRIPSY URETER(S), INSERT STENT;  Surgeon: Lester Orona MD;  Location: [facility]     CYSTOSCOPY       CYSTOSCOPY, RETROGRADES, COMBINED  4/17/2013    Procedure: COMBINED CYSTOSCOPY, RETROGRADES;;  Surgeon: Lester Orona MD;  Location: [facility] OR     CYSTOSCOPY, URETEROSCOPY, COMBINED  11/12/2013    Procedure: COMBINED CYSTOSCOPY, URETEROSCOPY;  CYSTOSCOPY, LEFT URETEROSCOPY, LEFT EXTRACORPOREAL SHOCKWAVE LITHOTRIPSY  ;  Surgeon: Lester Orona MD;  Location: [facility] OR     EXTRACORPOREAL SHOCK WAVE LITHOTRIPSY (ESWL)  11/12/2013    Procedure: EXTRACORPOREAL SHOCK WAVE LITHOTRIPSY (ESWL);;  Surgeon: Lester Orona MD;  Location: [facility]     EXTRACORPOREAL SHOCK WAVE LITHOTRIPSY (ESWL) Bilateral 4/28/2015    Procedure: EXTRACORPOREAL SHOCK WAVE LITHOTRIPSY (ESWL);  Surgeon: Lester Orona MD;  Location: [facility]     EXTRACORPOREAL SHOCK WAVE LITHOTRIPSY, CYSTOSCOPY, INSERT STENT URETER(S), COMBINED  1/10/2012    Procedure:COMBINED EXTRACORPOREAL SHOCK WAVE LITHOTRIPSY,  CYSTOSCOPY, INSERT STENT URETER(S); LEFT EXTRACORPOREAL SHOCKWAVE LITHOTRIPSY, LEFT STENT; Surgeon:LESTER ORONA; Location:[facility] OR     GALLBLADDER SURGERY  2009     HYSTERECTOMY VAGINAL  1993    left  the ovaries     LASER HOLMIUM LITHOTRIPSY URETER(S), INSERT STENT, COMBINED  4/17/2013    Procedure: COMBINED CYSTOSCOPY, URETEROSCOPY, LASER HOLMIUM LITHOTRIPSY URETER(S), INSERT STENT;  CYSTOSCOPY, Right URETEROSCOPY, LASER HOLMIUM LITHOTRIPSY URETER(S) standby only,Stone basketing, Right Retrogrades, ;  Surgeon: Lester Orona MD;  Location: [facility]     SHOULDER SURGERY  2009    left rotator cuff       Social History   Substance Use Topics     Smoking status: Never Smoker     Smokeless tobacco: Never Used     Alcohol use No     Family History   Problem Relation Age of Onset     Respiratory Mother      CHF     Alzheimer Disease Mother      Dementia     HEART DISEASE Father      Heart Attack     Respiratory Father      CHF     Breast Cancer Maternal Grandmother      Breast Cancer Paternal Grandmother      Arthritis Brother      Arthritis Sister      Hypertension Son      DIABETES Daughter      Arthritis Sister      Arthritis Sister          Current Outpatient Prescriptions   Medication Sig Dispense Refill     aspirin 81 MG tablet Take 81 mg by mouth daily       blood glucose monitoring (ONE TOUCH DELICA) lancets Use to test blood sugars 1 times daily or as directed. 100 each 11     blood glucose monitoring (ONE TOUCH VERIO IQ) test strip Use to test blood sugars 1 times daily or as directed. 50 each 11     blood glucose monitoring (ONETOUCH VERIO) meter device kit Use to test blood sugars 1 times daily or as directed. 1 kit 0     Cholecalciferol (VITAMIN D3 PO) Take 4,000 Units by mouth daily.       Coenzyme Q10 (CO Q 10 PO) Take 100 mg by mouth daily        cyanocolbalamin (VITAMIN  B-12) 1000 MCG tablet Take 1 tablet by mouth daily. 100 tablet 3     lisinopril (PRINIVIL/ZESTRIL) 40 MG tablet TAKE 1  "TABLET DAILY 90 tablet 1     loratadine (CLARITIN) 10 MG tablet Take 10 mg by mouth daily       metFORMIN (GLUCOPHAGE) 500 MG tablet Take 2 tablets (1,000 mg) by mouth 2 times daily (with meals) 360 tablet 0     potassium citrate (UROCIT-K) 10 MEQ (1080 MG) CR tablet Take 1 tablet (10 mEq) by mouth 3 times daily (with meals) 270 tablet 1     simvastatin (ZOCOR) 20 MG tablet TAKE 1 TABLET AT BEDTIME 90 tablet 1     Allergies   Allergen Reactions     Dust Mites      Seasonal Allergies            Review of Systems   Constitutional: Negative for chills and fever.   HENT: Negative for congestion, ear pain, hearing loss and sore throat.    Eyes: Negative for pain and visual disturbance.   Respiratory: Negative for cough and shortness of breath.    Cardiovascular: Negative for chest pain, palpitations and peripheral edema.   Gastrointestinal: Negative for abdominal pain, constipation, diarrhea, heartburn, hematochezia and nausea.   Genitourinary: Negative for dysuria, frequency, genital sores, hematuria, pelvic pain, urgency, vaginal bleeding and vaginal discharge.   Musculoskeletal: Negative for arthralgias, joint swelling and myalgias.   Skin: Negative for rash.   Neurological: Negative for dizziness, weakness, headaches and paresthesias.   Psychiatric/Behavioral: Negative for mood changes. The patient is not nervous/anxious.      This document serves as a record of the services and decisions personally performed and made by Niesha Dietrich MD. It was created on her behalf by Norma Knowles, a trained medical scribe. The creation of this document is based the provider's statements to the medical scribe.    Norma Knowles April 24, 2018 7:55 AM  OBJECTIVE:   /64 (BP Location: Right arm, Patient Position: Sitting, Cuff Size: Adult Regular)  Pulse 102  Temp 97.4  F (36.3  C) (Oral)  Ht 5' 3\" (1.6 m)  Wt 202 lb 6.4 oz (91.8 kg)  SpO2 97%  BMI 35.85 kg/m2 Estimated body mass index is 36.05 kg/(m^2) as " "calculated from the following:    Height as of 1/10/18: 5' 3\" (1.6 m).    Weight as of 1/10/18: 203 lb 8 oz (92.3 kg).  Physical Exam  GENERAL APPEARANCE:  alert and no distress  EYES: Eyes grossly normal to inspection, PERRL and conjunctivae and sclerae normal  HENT: ear canals and TM's normal, nose and mouth without ulcers or lesions, oropharynx clear and oral mucous membranes moist  NECK: no adenopathy, no asymmetry, masses, or scars and thyroid normal to palpation  RESP: lungs clear to auscultation - no rales, rhonchi or wheezes  BREAST: normal without masses, tenderness or nipple discharge and no palpable axillary masses or adenopathy  CV: regular rate and rhythm, normal S1 S2, no S3 or S4, no murmur, click or rub, no peripheral edema   ABDOMEN: soft, nontender, no hepatosplenomegaly, no masses and bowel sounds normal  MS: no musculoskeletal defects are noted and gait is age appropriate without ataxia  SKIN: no suspicious lesions or rashes  NEURO: Normal strength and tone, sensory exam grossly normal, mentation intact and speech normal  PSYCH: mentation appears normal and affect normal/bright  Diabetic Foot Exam: no edema or swelling, posterior tibial pulse and dorsalis pedis pulses intact, no sores or fissures; normal sensory and monofilament exam    Lab Results   Component Value Date    A1C 7.0 04/20/2018    A1C 6.9 01/10/2018    A1C 8.4 10/10/2017    A1C 7.5 07/13/2017    A1C 7.8 04/06/2017     ASSESSMENT / PLAN:   (Z00.00) Routine general medical examination at a health care facility  (primary encounter diagnosis)  -- immunizations utd; patient to get shingles vaccination at pharmacy   -- schedule mammogram   -- schedule eye exam   -- colonoscopy utd     (E11.42) Type 2 diabetes mellitus with diabetic polyneuropathy, without long-term current use of insulin (H)  -- A1c today at 7.0; no symptoms of low blood sugars   -- on metformin, ace, statin and asa; tolerating meds well, no change to medications.    -- " "reviewed labs with patient  Plan: metFORMIN (GLUCOPHAGE) 500 MG tablet       Benign hypertension   -renal function normal, microalbuminuria resolved.   Plan: lisinopril (PRINIVIL/ZESTRIL) 40 MG tablet      (I25.10) Coronary artery disease involving native coronary artery of native heart without angina pectoris  -asymptomatic  -manage risk factors- on statin, asa    (E78.5) Hyperlipidemia LDL goal <70  -reviewed labs with patient, LDL at goal   Plan: simvastatin (ZOCOR) 20 MG tablet            (E53.8) Vitamin B12 deficiency without anemia  -- recheck levels   Plan: cyanocobalamin (VITAMIN  B-12) 1000 MCG tablet,        Vitamin B12                (E66.01) Morbid obesity due to excess calories (H)  -- encouraged continued diet and regular exercise   -- discussed with patient weight in combination with diabetes puts her at risk for weight related health issues       (J30.1) Chronic seasonal allergic rhinitis due to pollen  Plan: loratadine (CLARITIN) 10 MG tablet            (Z12.31) Encounter for screening mammogram for breast cancer  Plan: *MA Screening Digital Bilateral          Follow up on October 23rd for diabetes follow up with A1c 10 min prior, or as needed.     End of Life Planning:  Patient currently has an advanced directive: Yes.  Practitioner is supportive of decision.    COUNSELING:  Reviewed preventive health counseling, as reflected in patient instructions       Regular exercise       Healthy diet/nutrition       Vision screening       Immunizations    Discussed side effects of Shingrx vaccination       Estimated body mass index is 36.05 kg/(m^2) as calculated from the following:    Height as of 1/10/18: 5' 3\" (1.6 m).    Weight as of 1/10/18: 203 lb 8 oz (92.3 kg).  Weight management plan: Discussed healthy diet and exercise guidelines and patient will follow up in 12 months in clinic to re-evaluate.   reports that she has never smoked. She has never used smokeless tobacco.      Appropriate preventive " services were discussed with this patient, including applicable screening as appropriate for cardiovascular disease, diabetes, osteopenia/osteoporosis, and glaucoma.  As appropriate for age/gender, discussed screening for colorectal cancer, prostate cancer, breast cancer, and cervical cancer. Checklist reviewing preventive services available has been given to the patient.    Reviewed patients plan of care and provided an AVS. The Basic Care Plan (routine screening as documented in Health Maintenance) for Debi meets the Care Plan requirement. This Care Plan has been established and reviewed with the Patient.    Counseling Resources:  ATP IV Guidelines  Pooled Cohorts Equation Calculator  Breast Cancer Risk Calculator  FRAX Risk Assessment  ICSI Preventive Guidelines  Dietary Guidelines for Americans, 2010  [employer] MyPlate  ASA Prophylaxis  Lung CA Screening    The information in this document, created by the medical scribe for me, accurately reflects the services I personally performed and the decisions made by me. I have reviewed and approved this document for accuracy prior to leaving the patient care area.  Answers for HPI/ROS submitted by the patient on 4/24/2018   PHQ-2 Score: 0    The information in this document, created by the medical scribe for me, accurately reflects the services I personally performed and the decisions made by me. I have reviewed and approved this document for accuracy prior to leaving the patient care area.  Niesha Dietrich MD  [facility]

## 2018-04-25 ENCOUNTER — RADIANT APPOINTMENT (OUTPATIENT)
Dept: MAMMOGRAPHY | Facility: CLINIC | Age: 73
End: 2018-04-25
Payer: COMMERCIAL

## 2018-04-25 DIAGNOSIS — Z12.31 VISIT FOR SCREENING MAMMOGRAM: ICD-10-CM

## 2018-04-25 DIAGNOSIS — Z12.31 ENCOUNTER FOR SCREENING MAMMOGRAM FOR BREAST CANCER: ICD-10-CM

## 2018-04-25 PROCEDURE — 77067 SCR MAMMO BI INCL CAD: CPT | Mod: TC

## 2018-04-27 ENCOUNTER — TRANSFERRED RECORDS (OUTPATIENT)
Dept: HEALTH INFORMATION MANAGEMENT | Facility: CLINIC | Age: 73
End: 2018-04-27

## 2018-05-30 DIAGNOSIS — N20.0 CALCULUS OF KIDNEY: ICD-10-CM

## 2018-05-31 RX ORDER — POTASSIUM CITRATE 10 MEQ/1
TABLET, EXTENDED RELEASE ORAL
Qty: 270 TABLET | Refills: 1 | Status: SHIPPED | OUTPATIENT
Start: 2018-05-31 | End: 2018-12-21

## 2018-10-23 ENCOUNTER — OFFICE VISIT (OUTPATIENT)
Dept: PEDIATRICS | Facility: CLINIC | Age: 73
End: 2018-10-23
Payer: COMMERCIAL

## 2018-10-23 VITALS
BODY MASS INDEX: 36.8 KG/M2 | HEART RATE: 95 BPM | WEIGHT: 207.7 LBS | OXYGEN SATURATION: 97 % | HEIGHT: 63 IN | DIASTOLIC BLOOD PRESSURE: 64 MMHG | SYSTOLIC BLOOD PRESSURE: 118 MMHG | TEMPERATURE: 97.6 F

## 2018-10-23 DIAGNOSIS — E11.42 TYPE 2 DIABETES MELLITUS WITH DIABETIC POLYNEUROPATHY, WITHOUT LONG-TERM CURRENT USE OF INSULIN (H): Primary | ICD-10-CM

## 2018-10-23 DIAGNOSIS — E11.42 TYPE 2 DIABETES MELLITUS WITH DIABETIC POLYNEUROPATHY, WITHOUT LONG-TERM CURRENT USE OF INSULIN (H): ICD-10-CM

## 2018-10-23 DIAGNOSIS — Z13.820 SCREENING FOR OSTEOPOROSIS: ICD-10-CM

## 2018-10-23 DIAGNOSIS — I25.10 CORONARY ARTERY DISEASE INVOLVING NATIVE CORONARY ARTERY OF NATIVE HEART WITHOUT ANGINA PECTORIS: ICD-10-CM

## 2018-10-23 DIAGNOSIS — Z23 FLU VACCINE NEED: ICD-10-CM

## 2018-10-23 DIAGNOSIS — E53.8 VITAMIN B12 DEFICIENCY WITHOUT ANEMIA: ICD-10-CM

## 2018-10-23 DIAGNOSIS — M72.2 PLANTAR FASCIITIS: ICD-10-CM

## 2018-10-23 DIAGNOSIS — I10 BENIGN ESSENTIAL HYPERTENSION: ICD-10-CM

## 2018-10-23 DIAGNOSIS — E78.5 HYPERLIPIDEMIA LDL GOAL <70: ICD-10-CM

## 2018-10-23 DIAGNOSIS — E66.01 MORBID OBESITY DUE TO EXCESS CALORIES (H): ICD-10-CM

## 2018-10-23 LAB
HBA1C MFR BLD: 6.9 % (ref 0–5.6)
TSH SERPL DL<=0.005 MIU/L-ACNC: 1.91 MU/L (ref 0.4–4)
VIT B12 SERPL-MCNC: 476 PG/ML (ref 193–986)

## 2018-10-23 PROCEDURE — 83036 HEMOGLOBIN GLYCOSYLATED A1C: CPT | Performed by: INTERNAL MEDICINE

## 2018-10-23 PROCEDURE — 90662 IIV NO PRSV INCREASED AG IM: CPT | Performed by: INTERNAL MEDICINE

## 2018-10-23 PROCEDURE — G0008 ADMIN INFLUENZA VIRUS VAC: HCPCS | Performed by: INTERNAL MEDICINE

## 2018-10-23 PROCEDURE — 82607 VITAMIN B-12: CPT | Performed by: INTERNAL MEDICINE

## 2018-10-23 PROCEDURE — 99207 C PAF COMPLETED  NO CHARGE: CPT | Performed by: INTERNAL MEDICINE

## 2018-10-23 PROCEDURE — 36415 COLL VENOUS BLD VENIPUNCTURE: CPT | Performed by: INTERNAL MEDICINE

## 2018-10-23 PROCEDURE — 99214 OFFICE O/P EST MOD 30 MIN: CPT | Mod: 25 | Performed by: INTERNAL MEDICINE

## 2018-10-23 PROCEDURE — 84443 ASSAY THYROID STIM HORMONE: CPT | Performed by: INTERNAL MEDICINE

## 2018-10-23 NOTE — PATIENT INSTRUCTIONS
Cut Lisinopril in 1/2. Continue to check blood pressures in the morning, 4-5 days or when you feel dizzy or fuzzy.  Send them to me in 1 week.     Schedule DEXA (bone density) scanning.     Try to increase exercise to 4-5 days per week.     Place tennis ball in the freezer and then roll your foot over the ball to stretch the muscles in your foot. If not improving then let me know and we will send you to podiatry.     There are medications that can help with the burning and pain in your feet if it gets to a point you want to try. Let me know you wish to try.

## 2018-10-23 NOTE — MR AVS SNAPSHOT
After Visit Summary   10/23/2018    Debi Mo    MRN: [id number]           Patient Information     Date Of Birth          1945        Visit Information        Provider Department      10/23/2018 10:00 AM Niesha Dietrich MD [facility]        Today's Diagnoses     Type 2 diabetes mellitus with diabetic polyneuropathy, without long-term current use of insulin (H)    -  1    Benign essential hypertension        Plantar fasciitis        Coronary artery disease involving native coronary artery of native heart without angina pectoris        Morbid obesity due to excess calories (H)        Flu vaccine need        Screening for osteoporosis        Hyperlipidemia LDL goal <70          Care Instructions    Cut Lisinopril in 1/2. Continue to check blood pressures in the morning, 4-5 days or when you feel dizzy or fuzzy.  Send them to me in 1 week.     Schedule DEXA (bone density) scanning.     Try to increase exercise to 4-5 days per week.     Place tennis ball in the freezer and then roll your foot over the ball to stretch the muscles in your foot. If not improving then let me know and we will send you to podiatry.     There are medications that can help with the burning and pain in your feet if it gets to a point you want to try. Let me know you wish to try.           Follow-ups after your visit        Follow-up notes from your care team     Return in 6 months (on 4/24/2019) for Physical Exam.      Your next 10 appointments already scheduled     Apr 19, 2019 10:00 AM CDT   LAB with EA LAB   [facility] [place] ([facility])    [address] [place] [id number] [phone]           Please do not eat 10-12 hours before your appointment if you are coming in fasting for labs on lipids, cholesterol, or glucose (sugar). This does not apply to pregnant women. Water, hot tea and black coffee (with nothing added) are okay. Do not drink other  fluids, diet soda or chew gum.            Apr 24, 2019 11:00 AM CDT   PHYSICAL with Niesha Dietrich MD   [facility] [place] ([facility])    [address]  [address]  [place] [address] [phone]              Future tests that were ordered for you today     Open Future Orders        Priority Expected Expires Ordered    Hemoglobin A1c Routine  5/23/2019 10/23/2018    Comprehensive metabolic panel Routine  5/23/2019 10/23/2018    Lipid panel reflex to direct LDL Fasting Routine  5/23/2019 10/23/2018    Albumin Random Urine Quantitative with Creat Ratio Routine  5/23/2019 10/23/2018    DX Hip/Pelvis/Spine Routine  10/22/2019 10/23/2018            Who to contact     If you have questions or need follow up information about today's clinic visit or your schedule please contact [facility] directly at [phone].  Normal or non-critical lab and imaging results will be communicated to you by MyChart, letter or phone within 4 business days after the clinic has received the results. If you do not hear from us within 7 days, please contact the clinic through [employer] or phone. If you have a critical or abnormal lab result, we will notify you by phone as soon as possible.  Submit refill requests through [employer] or call your pharmacy and they will forward the refill request to us. Please allow 3 business days for your refill to be completed.          Additional Information About Your Visit        [employer] Information     [employer] gives you secure access to your electronic health record. If you see a primary care provider, you can also send messages to your care team and make appointments. If you have questions, please call your primary care clinic.  If you do not have a primary care provider, please call [phone] and they will assist you.        Care EveryWhere ID     This is your Care EveryWhere ID. This could be used by other organizations to access your [facility]  "medical records  [id number]        Your Vitals Were     Pulse Temperature Height Pulse Oximetry BMI (Body Mass Index)       95 97.6  F (36.4  C) (Oral) 5' 3\" (1.6 m) 97% 36.79 kg/m2        Blood Pressure from Last 3 Encounters:   10/23/18 118/64   04/24/18 122/64   01/10/18 118/62    Weight from Last 3 Encounters:   10/23/18 207 lb 11.2 oz (94.2 kg)   04/24/18 202 lb 6.4 oz (91.8 kg)   01/10/18 203 lb 8 oz (92.3 kg)              We Performed the Following     FLU VACCINE, INCREASED ANTIGEN, PRESV FREE          Where to get your medicines      These medications were sent to [place] [id number] [place] [facility] [place], [place] - [address]  [address]  [place] [address]     Phone:  [phone]     metFORMIN 500 MG tablet          Primary Care Provider Office Phone # Fax #    Niesha Dietrich [phone] [address] [place] [address]        Equal Access to Services     [facility]: Hadii jay tao hadashsinai Sosin, waaxda luqadaha, qaybta kaalaletha breen, laura talley. So [facility] [phone].    ATENCIÓN: Si habla español, tiene a girssom disposición servicios gratuitos de asistencia lingüística. [facility] [phone].    We comply with applicable federal civil rights laws and [place] laws. We do not discriminate on the basis of race, color, national origin, age, disability, sex, sexual orientation, or gender identity.            Thank you!     Thank you for choosing [facility]  for your care. Our goal is always to provide you with excellent care. Hearing back from our patients is one way we can continue to improve our services. Please take a few minutes to complete the written survey that you may receive in the mail after your visit with us. Thank you!             Your Updated Medication List - Protect others around you: Learn how to safely use, store and throw away your medicines at www.disposemymeds.org.          This list is accurate " as of 10/23/18 10:38 AM.  Always use your most recent med list.                   Brand Name Dispense Instructions for use Diagnosis    aspirin 81 MG tablet      Take 81 mg by mouth daily        blood glucose monitoring lancets     100 each    Use to test blood sugars 1 times daily or as directed.    Type 2 diabetes mellitus with diabetic polyneuropathy, without long-term current use of insulin (H)       blood glucose monitoring meter device kit     1 kit    Use to test blood sugars 1 times daily or as directed.    Type 2 diabetes, controlled, with peripheral neuropathy (H)       blood glucose monitoring test strip    ONETOUCH VERIO IQ    50 each    Use to test blood sugars 1 times daily or as directed.    Type 2 diabetes mellitus with diabetic polyneuropathy, without long-term current use of insulin (H)       CO Q 10 PO      Take 100 mg by mouth daily        cyanocobalamin 1000 MCG tablet    vitamin  B-12    100 tablet    Take 1 tablet (1,000 mcg) by mouth daily    Vitamin B12 deficiency without anemia       lisinopril 40 MG tablet    PRINIVIL/ZESTRIL    90 tablet    Take 1 tablet (40 mg) by mouth daily        loratadine 10 MG tablet    CLARITIN    90 tablet    Take 1 tablet (10 mg) by mouth daily    Chronic seasonal allergic rhinitis due to pollen       metFORMIN 500 MG tablet    GLUCOPHAGE    360 tablet    Take 2 tablets (1,000 mg) by mouth 2 times daily (with meals)    Type 2 diabetes mellitus with diabetic polyneuropathy, without long-term current use of insulin (H)       potassium citrate 10 MEQ (1080 MG) CR tablet    UROCIT-K    270 tablet    TAKE 1 TABLET (10 MEQ) BY MOUTH 3 TIMES DAILY (WITH MEALS)    Calculus of kidney       simvastatin 20 MG tablet    ZOCOR    90 tablet    Take 1 tablet (20 mg) by mouth At Bedtime    Hyperlipidemia LDL goal <70       VITAMIN D3 PO      Take 4,000 Units by mouth daily.

## 2018-10-23 NOTE — PROGRESS NOTES
"  SUBJECTIVE:   Debi Mo is a 73 year old female who presents to clinic today for the following health issues:      Diabetes Follow-up      Patient is checking blood sugars: not at all    Diabetic concerns: None     Symptoms of hypoglycemia (low blood sugar): none     Paresthesias (numbness or burning in feet) or sores: Yes \"a lot of trouble\"      Date of last diabetic eye exam: 04/2018    Diabetes Management Resources      Hypertension Follow-up      Outpatient blood pressures are being checked at home.  Results are varied. Been experiencing dizziness and light headedness     Low Salt Diet: no added salt    BP Readings from Last 2 Encounters:   10/23/18 118/64   04/24/18 122/64     Hemoglobin A1C (%)   Date Value   10/23/2018 6.9 (H)   04/20/2018 7.0 (H)     LDL Cholesterol Calculated (mg/dL)   Date Value   04/20/2018 42   04/06/2017 34       Amount of exercise or physical activity: \"some\"     Problems taking medications regularly: No    Medication side effects: none    Diet: low salt    Diabetes   A1c today was 6.9. Patient reports she is not checking her blood sugars at home. Notes she is doing well balancing her diet, but has her \"days\". Notes her bilateral feet are numb most of the time R>L. States she has some shooting pains in her feet. Believes she has some plantar fasciitis since in the morning when she gets out of bed her R great toe is painful. She makes sure to wear supportive shoes.Has pain at all times per day, but does not bother her enough to start medication. Got herself an exercise bike that she uses for exercise. Patient does this a few times a week.     Hypertension   Patient reports over the weekend she was having issues with hypotension. Took her BPs and brought in log. States she was feeling dizzy when climbing stairs with laundry basket. She is wondering if she needs to decrease her lisinopril dose. Has some shortness of breath with stairs and inclines; no change over past 2 years. "     Skin   Patient wondering about spots on her back that are brown and round.     Problem list and histories reviewed & adjusted, as indicated.  Additional history: as documented    Patient Active Problem List   Diagnosis     Seasonal allergic rhinitis     Advance Care Planning     Vitamin B12 deficiency without anemia     High triglycerides     History of nephrolithiasis     Serum calcium elevated     Hyperlipidemia LDL goal <70     Peripheral neuropathy     Health Care Home     LBBB (left bundle branch block)     Vitamin D deficiency     Coronary artery disease involving native coronary artery without angina pectoris     Morbid obesity due to excess calories (H)     Type 2 diabetes mellitus with diabetic polyneuropathy, without long-term current use of insulin (H)     Benign essential hypertension     Past Surgical History:   Procedure Laterality Date     CARDIAC SURGERY      ANGIOGRAM     CHOLECYSTECTOMY       COLONOSCOPY       COMBINED CYSTOSCOPY, RETROGRADES, URETEROSCOPY, LASER HOLMIUM LITHOTRIPSY URETER(S), INSERT STENT Bilateral 9/28/2016    Procedure: COMBINED CYSTOSCOPY, RETROGRADES, URETEROSCOPY, LASER HOLMIUM LITHOTRIPSY URETER(S), INSERT STENT;  Surgeon: Lester Orona MD;  Location: [facility] OR     CYSTOSCOPY       CYSTOSCOPY, RETROGRADES, COMBINED  4/17/2013    Procedure: COMBINED CYSTOSCOPY, RETROGRADES;;  Surgeon: Lester Orona MD;  Location: [facility] OR     CYSTOSCOPY, URETEROSCOPY, COMBINED  11/12/2013    Procedure: COMBINED CYSTOSCOPY, URETEROSCOPY;  CYSTOSCOPY, LEFT URETEROSCOPY, LEFT EXTRACORPOREAL SHOCKWAVE LITHOTRIPSY  ;  Surgeon: Lester Orona MD;  Location: [facility] OR     EXTRACORPOREAL SHOCK WAVE LITHOTRIPSY (ESWL)  11/12/2013    Procedure: EXTRACORPOREAL SHOCK WAVE LITHOTRIPSY (ESWL);;  Surgeon: Lester Orona MD;  Location: [facility] OR     EXTRACORPOREAL SHOCK WAVE LITHOTRIPSY (ESWL) Bilateral 4/28/2015    Procedure: EXTRACORPOREAL SHOCK WAVE LITHOTRIPSY (ESWL);  Surgeon:  Lester Orona MD;  Location: [facility] OR     EXTRACORPOREAL SHOCK WAVE LITHOTRIPSY, CYSTOSCOPY, INSERT STENT URETER(S), COMBINED  1/10/2012    Procedure:COMBINED EXTRACORPOREAL SHOCK WAVE LITHOTRIPSY, CYSTOSCOPY, INSERT STENT URETER(S); LEFT EXTRACORPOREAL SHOCKWAVE LITHOTRIPSY, LEFT STENT; Surgeon:LESTER ORONA; Location:[facility] OR     GALLBLADDER SURGERY  2009     HYSTERECTOMY VAGINAL  1993    left  the ovaries     LASER HOLMIUM LITHOTRIPSY URETER(S), INSERT STENT, COMBINED  4/17/2013    Procedure: COMBINED CYSTOSCOPY, URETEROSCOPY, LASER HOLMIUM LITHOTRIPSY URETER(S), INSERT STENT;  CYSTOSCOPY, Right URETEROSCOPY, LASER HOLMIUM LITHOTRIPSY URETER(S) standby only,Stone basketing, Right Retrogrades, ;  Surgeon: Lester Orona MD;  Location: [facility] OR     SHOULDER SURGERY  2009    left rotator cuff       Social History   Substance Use Topics     Smoking status: Never Smoker     Smokeless tobacco: Never Used     Alcohol use No     Family History   Problem Relation Age of Onset     Respiratory Mother      CHF     Alzheimer Disease Mother      Dementia     HEART DISEASE Father      Heart Attack     Respiratory Father      CHF     Breast Cancer Maternal Grandmother      Breast Cancer Paternal Grandmother      Arthritis Brother      Arthritis Sister      Hypertension Son      Diabetes Daughter      Arthritis Sister      Arthritis Sister          Current Outpatient Prescriptions   Medication Sig Dispense Refill     aspirin 81 MG tablet Take 81 mg by mouth daily       blood glucose monitoring (ONE TOUCH DELICA) lancets Use to test blood sugars 1 times daily or as directed. 100 each 11     blood glucose monitoring (ONE TOUCH VERIO IQ) test strip Use to test blood sugars 1 times daily or as directed. 50 each 11     blood glucose monitoring (ONETOUCH VERIO) meter device kit Use to test blood sugars 1 times daily or as directed. 1 kit 0     Cholecalciferol (VITAMIN D3 PO) Take 4,000 Units by mouth daily.        Coenzyme Q10 (CO Q 10 PO) Take 100 mg by mouth daily        cyanocobalamin (VITAMIN  B-12) 1000 MCG tablet Take 1 tablet (1,000 mcg) by mouth daily 100 tablet 3     lisinopril (PRINIVIL/ZESTRIL) 40 MG tablet Take 1 tablet (40 mg) by mouth daily 90 tablet 3     loratadine (CLARITIN) 10 MG tablet Take 1 tablet (10 mg) by mouth daily 90 tablet 3     metFORMIN (GLUCOPHAGE) 500 MG tablet Take 2 tablets (1,000 mg) by mouth 2 times daily (with meals) 360 tablet 1     potassium citrate (UROCIT-K) 10 MEQ (1080 MG) CR tablet TAKE 1 TABLET (10 MEQ) BY MOUTH 3 TIMES DAILY (WITH MEALS) 270 tablet 1     simvastatin (ZOCOR) 20 MG tablet Take 1 tablet (20 mg) by mouth At Bedtime 90 tablet 3     Allergies   Allergen Reactions     Dust Mites      Seasonal Allergies      Recent Labs   Lab Test  10/23/18   0937  04/20/18   1018  01/10/18   1038   08/10/17   1551   04/06/17   0928  10/12/16   1038   04/15/16   1020   04/23/15   0400   10/14/14   0940   A1C  6.9*  7.0*  6.9*   < >   --    < >  7.8*  7.1*   < >  7.6*   < >   --    < >  6.3*   LDL   --   42   --    --    --    --   34   --    --   27   --    --    < >   --    HDL   --   28*   --    --    --    --   34*   --    --   33*   --    --    < >   --    TRIG   --   252*   --    --    --    --   314*   --    --   331*   < >   --    < >   --    ALT   --   17   --    --    --    --   45   --    --    --    --   29   --   19   CR   --   0.80   --    --    --    --   0.88  0.83   --    --    < >  0.75   < >  0.80   GFRESTIMATED   --   71   --    --   71   --   63  68   --    --    < >  77   < >  72   GFRESTBLACK   --   86   --    --   85   --   77  82   --    --    < >  >90   GFR Calc     < >  87   POTASSIUM   --   4.4   --    --    --    --   4.7  4.4   < >   --    < >  4.1   < >  4.2   TSH   --    --    --    --    --    --    --   1.58   --    --    --    --    --   2.20    < > = values in this interval not displayed.      BP Readings from Last 3 Encounters:  "  10/23/18 118/64   04/24/18 122/64   01/10/18 118/62    Wt Readings from Last 3 Encounters:   10/23/18 94.2 kg (207 lb 11.2 oz)   04/24/18 91.8 kg (202 lb 6.4 oz)   01/10/18 92.3 kg (203 lb 8 oz)                  Labs reviewed in EPIC    Reviewed and updated as needed this visit by clinical staff  Tobacco  Allergies  Meds  Med Hx  Surg Hx  Fam Hx  Soc Hx      Reviewed and updated as needed this visit by Provider         ROS:  Constitutional, HEENT, cardiovascular, pulmonary, GI, , musculoskeletal, neuro, skin, endocrine and psych systems are negative, except as otherwise noted.    This document serves as a record of the services and decisions personally performed and made by Niesha Dietrich MD. It was created on her behalf by Norma Knowles, a trained medical scribe. The creation of this document is based the provider's statements to the medical scribe.    Norma Knowles October 23, 2018 10:16 AM  OBJECTIVE:     /64 (BP Location: Right arm, Patient Position: Sitting, Cuff Size: Adult Regular)  Pulse 95  Temp 97.6  F (36.4  C) (Oral)  Ht 1.6 m (5' 3\")  Wt 94.2 kg (207 lb 11.2 oz)  SpO2 97%  BMI 36.79 kg/m2  Body mass index is 36.79 kg/(m^2).  GENERAL: alert and no distress  HENT: ear canals and TM's normal, nose and mouth without ulcers or lesions  RESP: lungs clear to auscultation - no rales, rhonchi or wheezes  CV: regular rate and rhythm, normal S1 S2, no S3 or S4, no murmur, click or rub, no peripheral edema   ABDOMEN: soft, nontender,  and bowel sounds normal. Unable to assess HSM or masses due to body habitus.    MS: no gross musculoskeletal defects noted, no edema  PSYCH: mentation appears normal, affect normal/bright  Diabetic foot exam: 1+ DP pulses bilaterally and PT pulses normal, no trophic changes or ulcerative lesions, normal sensory exam and normal monofilament exam      Diagnostic Test Results:  Results for orders placed or performed in visit on 10/23/18 (from the past 24 " hour(s))   Hemoglobin A1c   Result Value Ref Range    Hemoglobin A1C 6.9 (H) 0 - 5.6 %       ASSESSMENT/PLAN:     (E11.42) Type 2 diabetes mellitus with diabetic polyneuropathy, without long-term current use of insulin (H)  (primary encounter diagnosis)  -- A1c 6.9 today; sugars stable   --currently taking metformin 1000mg BID   --on ACE, asa and statin  -- discussed medication options for neuropathy; patient does not wish to start medications at this time   -- advised patient to contact me if she wishes to start on medications for neuropathy  -- continue medications without changes   Plan: TSH with free T4 reflex, metFORMIN (GLUCOPHAGE)        500 MG tablet            (I10) Benign essential hypertension  -- patient with BP log and dizziness   -- will reduce lisinopril to 1/2 pill (20mg)  -- patient to continue to check BP over next week and send me via "[employer]   --unclear why patient is on potassium, may try to stop it and see if she still needs.    Plan: Comprehensive metabolic panel, Albumin Random         Urine Quantitative with Creat Ratio    (M72.2) Plantar fasciitis  -- recommended supportive shoes at all times; not going barefoot   -- exercises provided to help with plantar fasciitis     (I25.10) Coronary artery disease involving native coronary artery of native heart without angina pectoris  -- stable, followed by cardiology   -- continue without changes     (E66.01) Morbid obesity due to excess calories (H)  -- encouraged regular exercise and balanced diet       (Z23) Flu vaccine need  Plan: FLU VACCINE, INCREASED ANTIGEN, PRESV FREE        (Z13.820) Screening for osteoporosis  -- due for DEXA   Plan: DX Hip/Pelvis/Spine      (E78.5) Hyperlipidemia LDL goal <70  -- future scheduled   Plan: Comprehensive metabolic panel, Lipid panel         reflex to direct LDL Fasting         Follow up on 4/24 for physical or as needed.       The information in this document, created by the medical scribe for me,  accurately reflects the services I personally performed and the decisions made by me. I have reviewed and approved this document for accuracy prior to leaving the patient care area.  Niesha Dietrich MD  [facility]

## 2018-10-30 ENCOUNTER — MYC MEDICAL ADVICE (OUTPATIENT)
Dept: PEDIATRICS | Facility: CLINIC | Age: 73
End: 2018-10-30

## 2018-10-30 DIAGNOSIS — I10 BENIGN ESSENTIAL HYPERTENSION: Primary | ICD-10-CM

## 2018-10-30 RX ORDER — LISINOPRIL 20 MG/1
20 TABLET ORAL DAILY
Qty: 90 TABLET | Refills: 3 | Status: SHIPPED | OUTPATIENT
Start: 2018-10-30 | End: 2019-10-25

## 2018-10-30 NOTE — TELEPHONE ENCOUNTER
Per office appointment note of 10/23/18-Benign essential hypertension  -- patient with BP log and dizziness   -- will reduce lisinopril to 1/2 pill (20mg)    Order pended for 20 mg dose.  Please review and sign if in agreement.  VALERIE [place] RN

## 2018-12-21 DIAGNOSIS — N20.0 CALCULUS OF KIDNEY: ICD-10-CM

## 2018-12-21 RX ORDER — POTASSIUM CITRATE 10 MEQ/1
TABLET, EXTENDED RELEASE ORAL
Qty: 90 TABLET | Refills: 1 | Status: SHIPPED | OUTPATIENT
Start: 2018-12-21 | End: 2019-01-09

## 2019-01-08 ENCOUNTER — MYC MEDICAL ADVICE (OUTPATIENT)
Dept: PEDIATRICS | Facility: CLINIC | Age: 74
End: 2019-01-08

## 2019-01-08 DIAGNOSIS — N20.0 CALCULUS OF KIDNEY: ICD-10-CM

## 2019-01-09 RX ORDER — POTASSIUM CITRATE 10 MEQ/1
TABLET, EXTENDED RELEASE ORAL
Qty: 270 TABLET | Refills: 3 | Status: SHIPPED | OUTPATIENT
Start: 2019-01-09 | End: 2019-09-05

## 2019-01-09 NOTE — TELEPHONE ENCOUNTER
Potassium Citrate 10 meq  Last Written Prescription Date:  12/21/18  Last Fill Quantity: 90,  # refills: 1   Last office visit: 10/23/2018 with prescribing provider:  10/23/18   Future Office Visit:

## 2019-01-09 NOTE — TELEPHONE ENCOUNTER
Requested Prescriptions   Pending Prescriptions Disp Refills     potassium citrate (UROCIT-K) 10 MEQ (1080 MG) CR tablet 90 tablet 1     Sig: TAKE 1 TABLET (10 MEQ) BY MOUTH 3 TIMES DAILY (WITH MEALS)    There is no refill protocol information for this order        Routing refill request to provider for review/approval because:  Drug not on the [facility] refill protocol   VALERIE [place] RN

## 2019-01-09 NOTE — TELEPHONE ENCOUNTER
Notified patient via [employer].  Radha Barrow CMA ([facility])

## 2019-03-16 ENCOUNTER — OFFICE VISIT (OUTPATIENT)
Dept: URGENT CARE | Facility: URGENT CARE | Age: 74
End: 2019-03-16
Payer: COMMERCIAL

## 2019-03-16 VITALS
OXYGEN SATURATION: 96 % | TEMPERATURE: 97.9 F | WEIGHT: 209.2 LBS | BODY MASS INDEX: 37.06 KG/M2 | SYSTOLIC BLOOD PRESSURE: 108 MMHG | HEART RATE: 102 BPM | DIASTOLIC BLOOD PRESSURE: 60 MMHG

## 2019-03-16 DIAGNOSIS — L03.039 INFECTION OF TOENAIL: Primary | ICD-10-CM

## 2019-03-16 DIAGNOSIS — E11.42 TYPE 2 DIABETES MELLITUS WITH DIABETIC POLYNEUROPATHY, WITHOUT LONG-TERM CURRENT USE OF INSULIN (H): ICD-10-CM

## 2019-03-16 PROCEDURE — 99214 OFFICE O/P EST MOD 30 MIN: CPT | Performed by: PHYSICIAN ASSISTANT

## 2019-03-16 RX ORDER — CEPHALEXIN 500 MG/1
500 CAPSULE ORAL 3 TIMES DAILY
Qty: 21 CAPSULE | Refills: 0 | Status: SHIPPED | OUTPATIENT
Start: 2019-03-16 | End: 2019-04-24

## 2019-03-16 NOTE — PROGRESS NOTES
Debi Mo is a 73 y.o woman, with a pmhx that includes neuropathy and DM, who presents to  today for evaluation of tenderness along edge of right great toenail x several days duration.   She is concerned about possible toenail infection.     Toenail Problem -     Onset: First noted pain several days ago right distal and lateral edge right great toe.      Description:     Location: right distal and lateral edge right great toe.                     Toe pain  YES       Nail Changes (yellowing, thickening) NO                 RednessYES-                  Drainage no       History of trauma No    Accompanying Signs & Symptoms:   Fevers No   Other joint pain: NO   History of prior toenail problems or procedures? No   Therapies tried- none     ROS:     Mus/skel: positive as per above   Rheum: denies any new red, warm or swollen joint   Neuro: positive hx of neuropathy. 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     Dyspnea on exertion      Gastro-oesophageal reflux disease     in past     Heart attack (H)     possible silent MI or may be developing  cardiomyopathy     Hyperlipidemia LDL goal <100 2/11/2011     Kidney stone 2/11/2011    3 episodes (1993,2012 and presently)     Microalbuminuria 10/15/2014     Mumps      Numbness and tingling     diab neuropathy feet     Palpitations     upon exertion     PONV (postoperative nausea and vomiting)     1993     Reflux      Renal cyst, left 2/11/2011     Snores      Tuberculosis     as a child - negative now     Type 2 diabetes, HbA1c goal < 7% (H) 2/11/2011       Current Outpatient Medications   Medication     aspirin 81 MG tablet      blood glucose monitoring (ONE TOUCH DELICA) lancets     blood glucose monitoring (ONE TOUCH VERIO IQ) test strip     blood glucose monitoring (ONETOUCH VERIO) meter device kit     Cholecalciferol (VITAMIN D3 PO)     Coenzyme Q10 (CO Q 10 PO)     cyanocobalamin (VITAMIN  B-12) 1000 MCG tablet     lisinopril (PRINIVIL/ZESTRIL) 20 MG tablet     lisinopril (PRINIVIL/ZESTRIL) 40 MG tablet     loratadine (CLARITIN) 10 MG tablet     metFORMIN (GLUCOPHAGE) 500 MG tablet     potassium citrate (UROCIT-K) 10 MEQ (1080 MG) CR tablet     simvastatin (ZOCOR) 20 MG tablet     No current facility-administered medications for this visit.        Allergies   Allergen Reactions     Dust Mites      Seasonal Allergies           OBJECTIVE   /60   Pulse 102   Temp 97.9  F (36.6  C) (Oral)   Wt 94.9 kg (209 lb 3.2 oz)   SpO2 96%   BMI 37.06 kg/m          GENERAL:  Very pleasant, comfortable and generally well appearing.    RT FOOT: Positive for erythema distal nail edge and lateral nail edge (dorsum of toe only). There is no plantar surface involvement. There is NO interdigital involvement. Circulation and sensation intact. Remainder of foot exam unremarkable.   SKIN: Positive as per above. Remainder of skin unremarkable   NEURO: Alert and oriented.  Normal speech and mentation.  CN II/XII grossly intact.  Gait within normal limits.    PSYCH: NAD     ASSESSMENT/PLAN:      (L03.039) Infection of toenail  (primary encounter diagnosis)  MDM: Infected toenail with associated infection of surrounding skin and subcutaneous tissue (dorsal aspect only). No infection proximal to DIP crease. NO evidence of Cellulitis. NO plantar surface involvement (no felon).   Plan: cephALEXin (KEFLEX) 500 MG capsule,         PODIATRY/FOOT & ANKLE SURGERY REFERRAL        3/16/19 Urgent Care Plan:     1. Start the Keflex (Cephelexin) antibiotic I prescribed for you today.     2. Do warm, soapy foot soaks three times daily     3. Because of your diabetes  and neuropathy, it is very important for you to visually inspect your foot daily for any worsening as you cannot rely on pain alone for worsening of your infection.     3.  Follow-up immediately with your primary care provider or a podiatrist (foot specialist) for re-evaluation if your symptoms do not improve or if you develop any of the below symptoms after the treatment you have been provided here today:       Increasing redness, pain, or swelling of the toe    Red streaks in the skin leading away from the wound    Pus or fluid drainage    Fever of 100.4 F (38 C) or higher, or as directed by your provider    4. Continue taking your oral DM medications as prescribed by Dr. Dietrich         (E11.42) Type 2 diabetes mellitus with diabetic polyneuropathy, without long-term current use of insulin (H)

## 2019-03-16 NOTE — PATIENT INSTRUCTIONS
Patient Education     Ingrown Toenail, Infected (Antibiotics, No Excision)  An ingrown toenail occurs when the nail grows sideways into the skin alongside the nail. This can cause pain. It can also lead to an infection with redness, swelling, and sometimes drainage.  The most common cause of an ingrown toenail is trimming your nails wrong. Most people trim the nails too close to the skin and try to round the nail too tightly around the shape of the toe. When you do this, the nail can grow into the skin of your toe. It is safer to trim the nail ending in a straight line rather than a curve.  Other causes include injury or wearing shoes that are too short or tight. This can cause the same problem that happens when trimming your nails. Your genetics can also make this more likely to happen.  The following are the most common symptoms of an ingrown toenail:     Pain    Redness    Swelling    Drainage  If the infection is mild, you may be able to take care of it at home with the following measures:    Frequent warm water soaks    Keeping it clean    Wearing loose, comfortable shoes or sandals  Another method involves using a small piece of cotton or waxed dental floss to gently lift up the corner of the problem nail. Change the cotton or floss frequently, especially if it gets dirty.  If your infection is mild, and the above methods aren t working, or if the infection gets worse, see your healthcare provider. Signs of worsening infection include:    Swelling    Redness    Pus drainage  In some cases, you may need antibiotics along with warm soaks. If after 2 to 3 days of antibiotics the toenail doesn't get better or gets worse, part of the nail may need to be removed to drain the infection. With treatment, it can take 1 to 2 weeks to clear up completely.  Home care  Wound care  For the next 3 days, soak and clean your toe in warm water a few times a day.    Twice a day for the first 3 days, clean and soak the toe as  follows:  1. Soak your foot in a tub of warm water for 5 minutes. Or, hold your toe under a faucet of warm running water for 5 minute  2. Clean any remaining crust away with soap and water using a cotton swab.  3. Put a small amount of antibiotic ointment on the infected area.    Change the dressing or bandage every time you soak or clean it, or whenever it becomes wet or dirty.    If you were prescribed antibiotics, take them as directed until they are all gone.    Wear comfortable shoes with a lot of toe room, or open-toe sandals, while your toe is healing.  Medicines    You can take over-the-counter medicine for pain, unless you were given a different pain medicine to use. Note: Talk with your provider before using these medicines if you have chronic liver or kidney disease, ever had a stomach ulcer or GI (gastrointestinal) bleeding, or are taking blood thinner medicines.    If you were given antibiotics, take them until they are used up or your provider tells you to stop, even if the wound looks better. This ensures that the infection clears up.  Prevention  To prevent ingrown toenails:    Wear shoes that fit well. Avoid shoes that pinch the toes together.    When you trim your toenails, do not cut them too short. Cut straight across at the top and don t round the edges.    Don t use a sharp object to clean under your nail since this might cause an infection.    If the toenail starts to grow into the skin again, put a small piece of waxed dental floss or cotton under that side of the nail to help it grow out straight.  Follow-up care  Follow up with your healthcare provider, or as advised.  When to seek medical advice  Call your healthcare provider right away if any of the following occur:    Increasing redness, pain, or swelling of the toe    Red streaks in the skin leading away from the wound    Pus or fluid drainage    Fever of 100.4 F (38 C) or higher, or as directed by your provider  Date Last Reviewed:  12/1/2016 2000-2018 [employer]. [address] [place] [address], [place] [address]. All rights reserved. This information is not intended as a substitute for professional medical care. Always follow your healthcare professional's instructions.           3/16/19 Urgent Care Plan:     1. Start the Keflex (Cephelexin) antibiotic I prescribed for you today.     2. Do warm, soapy foot soaks three times daily     3. Because of your diabetes and neuropathy, it is very important for you to visually inspect your foot daily for any worsening as you cannot rely on pain alone for worsening of your infection.     3.  Follow-up immediately with your primary care provider or a podiatrist (foot specialist) for re-evaluation if your symptoms do not improve or if you develop any of the below symptoms after the treatment you have been provided here today:       Increasing redness, pain, or swelling of the toe    Red streaks in the skin leading away from the wound    Pus or fluid drainage    Fever of 100.4 F (38 C) or higher, or as directed by your provider    4. Continue taking your oral DM medications as prescribed by Dr. Dietrich

## 2019-03-19 ENCOUNTER — OFFICE VISIT (OUTPATIENT)
Dept: PEDIATRICS | Facility: CLINIC | Age: 74
End: 2019-03-19
Payer: COMMERCIAL

## 2019-03-19 VITALS
HEIGHT: 63 IN | DIASTOLIC BLOOD PRESSURE: 60 MMHG | WEIGHT: 210.2 LBS | SYSTOLIC BLOOD PRESSURE: 120 MMHG | HEART RATE: 101 BPM | BODY MASS INDEX: 37.25 KG/M2 | OXYGEN SATURATION: 98 % | TEMPERATURE: 97.3 F

## 2019-03-19 DIAGNOSIS — L03.031 CELLULITIS OF GREAT TOE, RIGHT: Primary | ICD-10-CM

## 2019-03-19 PROCEDURE — 99213 OFFICE O/P EST LOW 20 MIN: CPT | Performed by: FAMILY MEDICINE

## 2019-03-19 RX ORDER — CIPROFLOXACIN 500 MG/1
500 TABLET, FILM COATED ORAL 2 TIMES DAILY
Qty: 14 TABLET | Refills: 0 | Status: SHIPPED | OUTPATIENT
Start: 2019-03-19 | End: 2019-04-24

## 2019-03-19 ASSESSMENT — MIFFLIN-ST. JEOR: SCORE: 1427.59

## 2019-03-19 NOTE — PROGRESS NOTES
SUBJECTIVE:   Debi Mo is a 73 year old female who presents to clinic today for the following health issues:      ED/UC Followup:    Facility:  [facility]  Date of visit: 3/16/19  Reason for visit: toe pain  Current Status: Right big toe infected on antibiotics. The toe is wollen and red x 1 day pain almost all gone       HISTORY    Patient is in to recheck her right great toe.  She was started on cephalexin about 3 days ago due to possible infection.  Toe is primarily tender and red distally along the nail especially.  Currently no drainage.  She has been applying bacitracin and Band-Aid as well as soaking.    Patient does have diabetes and neuropathy.      Patient Active Problem List   Diagnosis     Seasonal allergic rhinitis     Advance Care Planning     Vitamin B12 deficiency without anemia     High triglycerides     History of nephrolithiasis     Serum calcium elevated     Hyperlipidemia LDL goal <70     Peripheral neuropathy     Health Care Home     LBBB (left bundle branch block)     Vitamin D deficiency     Coronary artery disease involving native coronary artery without angina pectoris     Morbid obesity due to excess calories (H)     Type 2 diabetes mellitus with diabetic polyneuropathy, without long-term current use of insulin (H)     Benign essential hypertension       REVIEW OF SYSTEMS    No fevers.  No breathing problems.  No chest pains.  No edema.      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     Dyspnea on exertion      Gastro-oesophageal reflux disease     in past     Heart attack (H)     possible silent MI or may be developing  cardiomyopathy     Hyperlipidemia LDL goal <100 2/11/2011     Kidney stone 2/11/2011    3 episodes (1993,2012 and presently)      "Microalbuminuria 10/15/2014     Mumps      Numbness and tingling     diab neuropathy feet     Palpitations     upon exertion     PONV (postoperative nausea and vomiting)     1993     Reflux      Renal cyst, left 2/11/2011     Snores      Tuberculosis     as a child - negative now     Type 2 diabetes, HbA1c goal < 7% (H) 2/11/2011         EXAM  /60 (BP Location: Right arm, Patient Position: Chair, Cuff Size: Adult Regular)   Pulse 101   Temp 97.3  F (36.3  C) (Tympanic)   Ht 1.6 m (5' 3\")   Wt 95.3 kg (210 lb 3.2 oz)   SpO2 98%   BMI 37.24 kg/m      R foot:  No edema  Distal portion of right great toe is reddened from about the level of the base of the nail distally.  Nail is slightly ingrown.  No purulent drainage.  No lymphangitis.      (L03.031) Cellulitis of great toe, right  (primary encounter diagnosis)  Comment:   Will add additional antibiotic coverage.  Have her checked by Podiatry also.    Plan: ciprofloxacin (CIPRO) 500 MG tablet,         PODIATRY/FOOT & ANKLE SURGERY REFERRAL                "

## 2019-04-19 DIAGNOSIS — E11.42 TYPE 2 DIABETES MELLITUS WITH DIABETIC POLYNEUROPATHY, WITHOUT LONG-TERM CURRENT USE OF INSULIN (H): ICD-10-CM

## 2019-04-19 DIAGNOSIS — I10 BENIGN ESSENTIAL HYPERTENSION: ICD-10-CM

## 2019-04-19 DIAGNOSIS — E78.5 HYPERLIPIDEMIA LDL GOAL <70: ICD-10-CM

## 2019-04-19 LAB
ALBUMIN SERPL-MCNC: 3.3 G/DL (ref 3.4–5)
ALP SERPL-CCNC: 79 U/L (ref 40–150)
ALT SERPL W P-5'-P-CCNC: 21 U/L (ref 0–50)
ANION GAP SERPL CALCULATED.3IONS-SCNC: 9 MMOL/L (ref 3–14)
AST SERPL W P-5'-P-CCNC: 19 U/L (ref 0–45)
BILIRUB SERPL-MCNC: 0.6 MG/DL (ref 0.2–1.3)
BUN SERPL-MCNC: 20 MG/DL (ref 7–30)
CALCIUM SERPL-MCNC: 10.7 MG/DL (ref 8.5–10.1)
CHLORIDE SERPL-SCNC: 108 MMOL/L (ref 94–109)
CHOLEST SERPL-MCNC: 137 MG/DL
CO2 SERPL-SCNC: 20 MMOL/L (ref 20–32)
CREAT SERPL-MCNC: 0.98 MG/DL (ref 0.52–1.04)
CREAT UR-MCNC: 143 MG/DL
GFR SERPL CREATININE-BSD FRML MDRD: 57 ML/MIN/{1.73_M2}
GLUCOSE SERPL-MCNC: 150 MG/DL (ref 70–99)
HBA1C MFR BLD: 7 % (ref 0–5.6)
HDLC SERPL-MCNC: 33 MG/DL
LDLC SERPL CALC-MCNC: 31 MG/DL
MICROALBUMIN UR-MCNC: 27 MG/L
MICROALBUMIN/CREAT UR: 18.74 MG/G CR (ref 0–25)
NONHDLC SERPL-MCNC: 104 MG/DL
POTASSIUM SERPL-SCNC: 4.7 MMOL/L (ref 3.4–5.3)
PROT SERPL-MCNC: 7.3 G/DL (ref 6.8–8.8)
SODIUM SERPL-SCNC: 137 MMOL/L (ref 133–144)
TRIGL SERPL-MCNC: 363 MG/DL

## 2019-04-19 PROCEDURE — 80053 COMPREHEN METABOLIC PANEL: CPT | Performed by: INTERNAL MEDICINE

## 2019-04-19 PROCEDURE — 83036 HEMOGLOBIN GLYCOSYLATED A1C: CPT | Performed by: INTERNAL MEDICINE

## 2019-04-19 PROCEDURE — 80061 LIPID PANEL: CPT | Performed by: INTERNAL MEDICINE

## 2019-04-19 PROCEDURE — 36415 COLL VENOUS BLD VENIPUNCTURE: CPT | Performed by: INTERNAL MEDICINE

## 2019-04-19 PROCEDURE — 82043 UR ALBUMIN QUANTITATIVE: CPT | Performed by: INTERNAL MEDICINE

## 2019-04-23 NOTE — PROGRESS NOTES
"SUBJECTIVE:   Debi Mo is a 74 year old female who presents for Preventive Visit.      Are you in the first 12 months of your Medicare coverage?  No    Healthy Habits:     In general, how would you rate your overall health?  Good    Frequency of exercise:  1 day/week    Duration of exercise:  Less than 15 minutes    Do you usually eat at least 4 servings of fruit and vegetables a day, include whole grains    & fiber and avoid regularly eating high fat or \"junk\" foods?  Yes    Taking medications regularly:  Yes    Medication side effects:  Muscle aches    Ability to successfully perform activities of daily living:  No assistance needed    Home Safety:  Lack of grab bars in the bathroom    Hearing Impairment:  Difficulty following a conversation in a noisy restaurant or crowded room and difficulty understanding soft or whispered speech    In the past 6 months, have you been bothered by leaking of urine? Yes    In general, how would you rate your overall mental or emotional health?  Good      PHQ-2 Total Score: 0    Additional concerns today:  Yes    Patient reports in October she would need a DEXA scan and was never called to schedule.     Has been on aspirin due to her diabetes and is wondering if she should continue to take it since she has read in the paper that you should not need to be on over 70.     Had an infection in Her L big toe in march and was getting worse. Since then her toe has not stopped hurting. Has been preventing her from a lot of exercise.     Wondering about getting some special shoes for diabetes.       Shortly after getting 2nd shingrix shot she has been getting scaly rash on her bilateral forearms and she picks them and they bleed. There are a few on her legs as well. \"they do not hurt they are just ugly\".        Do you feel safe in your environment? Yes    Do you have a Health Care Directive? Yes: Advance Directive has been received and scanned.      Fall risk  Fallen 2 or more times " in the past year?: No  Any fall with injury in the past year?: No    Cognitive Screening   1) Repeat 3 items (Leader, Season, Table)    2) Clock draw: NORMAL  3) 3 item recall: Recalls 2 objects   Results: NORMAL clock, 1-2 items recalled: COGNITIVE IMPAIRMENT LESS LIKELY    Mini-CogTM Copyright LUIS A Spencer. Licensed by the author for use in [facility]; reprinted with permission (jonathon@[email or web address]). All rights reserved.          Reviewed and updated as needed this visit by clinical staff  Tobacco  Allergies  Meds         Reviewed and updated as needed this visit by Provider        Social History     Tobacco Use     Smoking status: Never Smoker     Smokeless tobacco: Never Used   Substance Use Topics     Alcohol use: No     If you drink alcohol do you typically have >3 drinks per day or >7 drinks per week? No      Current providers sharing in care for this patient include:   Patient Care Team:  Niesha Dietrich MD as PCP - General (Pediatrics)  Niesha Dietrich MD as Assigned PCP  Niesha Dietrich MD as MD (Pediatrics)    The following health maintenance items are reviewed in Epic and correct as of today:  Health Maintenance   Topic Date Due     DEXA Q5 YR  10/10/2018     PHQ-2  01/01/2019     FOOT EXAM Q1 YEAR  01/10/2019     MEDICARE ANNUAL WELLNESS VISIT  04/24/2019     FALL RISK ASSESSMENT  04/24/2019     MAMMO Q1 YR  04/25/2019     EYE EXAM Q1 YEAR  04/27/2019     DTAP/TDAP/TD IMMUNIZATION (4 - Td) 06/01/2019     A1C Q6 MO  10/19/2019     COLON CANCER SCREEN (SYSTEM ASSIGNED)  01/01/2020     CREATININE Q1 YEAR  04/19/2020     LIPID MONITORING Q1 YEAR  04/19/2020     MICROALBUMIN Q1 YEAR  04/19/2020     TSH W/ FREE T4 REFLEX Q2 YEAR  10/23/2020     ADVANCE DIRECTIVE PLANNING Q5 YRS  12/06/2021     INFLUENZA VACCINE  Completed     ZOSTER IMMUNIZATION  Completed     HEPATITIS C SCREENING  Completed     IPV IMMUNIZATION  Aged Out     MENINGITIS IMMUNIZATION  Aged Out     Labs  reviewed in EPIC  BP Readings from Last 3 Encounters:   04/24/19 130/72   03/19/19 120/60   03/16/19 108/60    Wt Readings from Last 3 Encounters:   04/24/19 94.6 kg (208 lb 8 oz)   03/19/19 95.3 kg (210 lb 3.2 oz)   03/16/19 94.9 kg (209 lb 3.2 oz)                  Patient Active Problem List   Diagnosis     Seasonal allergic rhinitis     Advance Care Planning     Vitamin B12 deficiency without anemia     High triglycerides     History of nephrolithiasis     Serum calcium elevated     Hyperlipidemia LDL goal <70     Peripheral neuropathy     Health Care Home     LBBB (left bundle branch block)     Vitamin D deficiency     Coronary artery disease involving native coronary artery without angina pectoris     Morbid obesity due to excess calories (H)     Type 2 diabetes mellitus with diabetic polyneuropathy, without long-term current use of insulin (H)     Benign essential hypertension     Past Surgical History:   Procedure Laterality Date     CARDIAC SURGERY      ANGIOGRAM     CHOLECYSTECTOMY       COLONOSCOPY       COMBINED CYSTOSCOPY, RETROGRADES, URETEROSCOPY, LASER HOLMIUM LITHOTRIPSY URETER(S), INSERT STENT Bilateral 9/28/2016    Procedure: COMBINED CYSTOSCOPY, RETROGRADES, URETEROSCOPY, LASER HOLMIUM LITHOTRIPSY URETER(S), INSERT STENT;  Surgeon: Lester Orona MD;  Location: [facility] OR     CYSTOSCOPY       CYSTOSCOPY, RETROGRADES, COMBINED  4/17/2013    Procedure: COMBINED CYSTOSCOPY, RETROGRADES;;  Surgeon: Lester Orona MD;  Location: [facility] OR     CYSTOSCOPY, URETEROSCOPY, COMBINED  11/12/2013    Procedure: COMBINED CYSTOSCOPY, URETEROSCOPY;  CYSTOSCOPY, LEFT URETEROSCOPY, LEFT EXTRACORPOREAL SHOCKWAVE LITHOTRIPSY  ;  Surgeon: Lester Orona MD;  Location:  OR     EXTRACORPOREAL SHOCK WAVE LITHOTRIPSY (ESWL)  11/12/2013    Procedure: EXTRACORPOREAL SHOCK WAVE LITHOTRIPSY (ESWL);;  Surgeon: Lester Orona MD;  Location: [facility] OR     EXTRACORPOREAL SHOCK WAVE LITHOTRIPSY (ESWL)  Bilateral 4/28/2015    Procedure: EXTRACORPOREAL SHOCK WAVE LITHOTRIPSY (ESWL);  Surgeon: Lester Orona MD;  Location: [facility]     EXTRACORPOREAL SHOCK WAVE LITHOTRIPSY, CYSTOSCOPY, INSERT STENT URETER(S), COMBINED  1/10/2012    Procedure:COMBINED EXTRACORPOREAL SHOCK WAVE LITHOTRIPSY, CYSTOSCOPY, INSERT STENT URETER(S); LEFT EXTRACORPOREAL SHOCKWAVE LITHOTRIPSY, LEFT STENT; Surgeon:LESTER ORONA; Location:[facility] OR     GALLBLADDER SURGERY  2009     HYSTERECTOMY VAGINAL  1993    left  the ovaries     LASER HOLMIUM LITHOTRIPSY URETER(S), INSERT STENT, COMBINED  4/17/2013    Procedure: COMBINED CYSTOSCOPY, URETEROSCOPY, LASER HOLMIUM LITHOTRIPSY URETER(S), INSERT STENT;  CYSTOSCOPY, Right URETEROSCOPY, LASER HOLMIUM LITHOTRIPSY URETER(S) standby only,Stone basketing, Right Retrogrades, ;  Surgeon: Lester Orona MD;  Location: [facility] OR     SHOULDER SURGERY  2009    left rotator cuff       Social History     Tobacco Use     Smoking status: Never Smoker     Smokeless tobacco: Never Used   Substance Use Topics     Alcohol use: No     Family History   Problem Relation Age of Onset     Respiratory Mother         CHF     Alzheimer Disease Mother         Dementia     Heart Disease Father         Heart Attack     Respiratory Father         CHF     Breast Cancer Maternal Grandmother      Breast Cancer Paternal Grandmother      Arthritis Brother      Arthritis Sister      Hypertension Son      Diabetes Daughter      Arthritis Sister      Arthritis Sister          Current Outpatient Medications   Medication Sig Dispense Refill     aspirin 81 MG tablet Take 81 mg by mouth daily       blood glucose monitoring (ONE TOUCH DELICA) lancets Use to test blood sugars 1 times daily or as directed. 100 each 11     blood glucose monitoring (ONE TOUCH VERIO IQ) test strip Use to test blood sugars 1 times daily or as directed. 50 each 11     blood glucose monitoring (ONETOUCH VERIO) meter device kit Use to test blood sugars  "1 times daily or as directed. 1 kit 0     Cholecalciferol (VITAMIN D3 PO) Take 4,000 Units by mouth daily.       Coenzyme Q10 (CO Q 10 PO) Take 100 mg by mouth daily        cyanocobalamin (VITAMIN B-12) 1000 MCG tablet Take 1 tablet (1,000 mcg) by mouth daily 100 tablet 3     lisinopril (PRINIVIL/ZESTRIL) 20 MG tablet Take 1 tablet (20 mg) by mouth daily 90 tablet 3     loratadine (CLARITIN) 10 MG tablet Take 1 tablet (10 mg) by mouth daily 90 tablet 3     metFORMIN (GLUCOPHAGE) 500 MG tablet Take 2 tablets (1,000 mg) by mouth 2 times daily (with meals) 360 tablet 1     potassium citrate (UROCIT-K) 10 MEQ (1080 MG) CR tablet TAKE 1 TABLET (10 MEQ) BY MOUTH 3 TIMES DAILY (WITH MEALS) 270 tablet 3     simvastatin (ZOCOR) 20 MG tablet Take 1 tablet (20 mg) by mouth At Bedtime 90 tablet 3     Allergies   Allergen Reactions     Dust Mites      Seasonal Allergies          Review of Systems   Breasts:  Negative for tenderness, breast mass and discharge.   Genitourinary: Negative for pelvic pain, vaginal bleeding and vaginal discharge.   Musculoskeletal: Positive for myalgias.   Skin: Positive for rash.       This document serves as a record of the services and decisions personally performed and made by Niesha Dietrich MD. It was created on her behalf by Norma Knowles, a trained medical scribe. The creation of this document is based the provider's statements to the medical scribe.    Norma Knowles April 24, 2019 11:23 AM  OBJECTIVE:   /72 (BP Location: Right arm, Patient Position: Chair, Cuff Size: Adult Regular)   Pulse 104   Temp 98.2  F (36.8  C) (Oral)   Ht 1.6 m (5' 3\")   Wt 94.6 kg (208 lb 8 oz)   SpO2 96%   BMI 36.93 kg/m   Estimated body mass index is 36.93 kg/m  as calculated from the following:    Height as of this encounter: 1.6 m (5' 3\").    Weight as of this encounter: 94.6 kg (208 lb 8 oz).  Physical Exam  GENERAL:  alert and no distress  HENT: ear canals and TM's normal, nose and mouth " without ulcers or lesions  RESP: lungs clear to auscultation - no rales, rhonchi or wheezes  CV: regular rate and rhythm, normal S1 S2, no S3 or S4, no murmur, click or rub, no peripheral edema   ABDOMEN: soft, nontender,  and bowel sounds normal  MS: no gross musculoskeletal defects noted, no edema  PSYCH: mentation appears normal, affect normal/bright  Diabetic foot exam: normal DP and PT pulses, normal sensory exam and normal monofilament exam; Some callusing bilateral heels and ingrown toenail outer edge on the R great toe     Diagnostic Test Results:  No results found for this or any previous visit (from the past 24 hour(s)).    ASSESSMENT / PLAN:   (Z00.00) Encounter for Medicare annual wellness exam  (primary encounter diagnosis)  -- imm utd   -- mammo due   -- DEXA due   -- aged out of pap   -colonoscopy UTD   -- encouraged regular exercise and balanced diet       (E11.42) Type 2 diabetes mellitus with diabetic polyneuropathy, without long-term current use of insulin (H)  -- A1c up from 6.9 to 7.0. On metformin 1000mg BID.    -- has been borderline above goal for a while now; although considering CAD goal is below 8.   -- will not change medications at this point but stressed importance of diet/lifestyle modifications to control diabetes  -- hopefully taking care of ingrown toe nail will help increase exercise   -- referral to podiatry to discuss diabetic shoes   -- on ace, asa and statin   -- eye exam utd   Plan: metFORMIN (GLUCOPHAGE) 500 MG tablet      (I10) Benign essential hypertension  -- BP at goal on lisinopril  --renal function slightly lower than typical, continue to monitor   -- no changes to medications     (I25.10) Coronary artery disease involving native coronary artery of native heart without angina pectoris  -- asymptomatic and appears euvolemic  -- risk factor control:  Bp and lipids at goal, improve a1c with diet/exercise  -discussed importance of diet and exercise with patient at length  "    (E78.5) Hyperlipidemia LDL goal <70  -- stable on simvastatin; LDL at goal   -- no changes to medications   -- encouraged healthy fats and increasing exercise  to increase HDL  Plan: simvastatin (ZOCOR) 20 MG tablet      (E66.01) Morbid obesity due to excess calories (H)  -- encouraged regular exercise and balanced diet   -- hopefully getting her ingrown toenail taken care of will help increase movement     (E53.8) Vitamin B12 deficiency without anemia  Plan: cyanocobalamin (VITAMIN B-12) 1000 MCG tablet      (Z78.0) Asymptomatic postmenopausal status  Plan: DEXA HIP/PELVIS/SPINE - Future      (Z12.31) Visit for screening mammogram  Plan: MA SCREENING DIGITAL BILAT - Future  (s+30)      (J30.1) Chronic seasonal allergic rhinitis due to pollen  -- controlled with Claritin; no changes   Plan: loratadine (CLARITIN) 10 MG tablet       (L60.0) Ingrown toenail  -- referral to podiatry   Plan: PODIATRY/FOOT & ANKLE SURGERY REFERRAL    Follow up on October 24th for diabetes follow up with A1c 10 min prior or as needed.       End of Life Planning:  Patient currently has an advanced directive: Yes.  Practitioner is supportive of decision.    COUNSELING:  Reviewed preventive health counseling, as reflected in patient instructions       Regular exercise       Healthy diet/nutrition       Vision screening       Dental care    BP Readings from Last 1 Encounters:   04/24/19 130/72     Estimated body mass index is 36.93 kg/m  as calculated from the following:    Height as of this encounter: 1.6 m (5' 3\").    Weight as of this encounter: 94.6 kg (208 lb 8 oz).      Weight management plan: Discussed healthy diet and exercise guidelines     reports that she has never smoked. She has never used smokeless tobacco.      Appropriate preventive services were discussed with this patient, including applicable screening as appropriate for cardiovascular disease, diabetes, osteopenia/osteoporosis, and glaucoma.  As appropriate for " age/gender, discussed screening for colorectal cancer, prostate cancer, breast cancer, and cervical cancer. Checklist reviewing preventive services available has been given to the patient.    Reviewed patients plan of care and provided an AVS. The Complex Care Plan (for patients with higher acuity and needing more deliberate coordination of services) for Debi meets the Care Plan requirement. This Care Plan has been established and reviewed with the Patient.    Counseling Resources:  ATP IV Guidelines  Pooled Cohorts Equation Calculator  Breast Cancer Risk Calculator  FRAX Risk Assessment  ICSI Preventive Guidelines  Dietary Guidelines for Americans, 2010  USDA's MyPlate  ASA Prophylaxis  Lung CA Screening    The information in this document, created by the medical scribe for me, accurately reflects the services I personally performed and the decisions made by me. I have reviewed and approved this document for accuracy prior to leaving the patient care area.  Niesha Dietrich MD  [facility]    Identified Health Risks:

## 2019-04-23 NOTE — PATIENT INSTRUCTIONS
You can schedule the DEXA scan here.     Stay on the daily aspirin due to your diabetes.     See the podiatrist about your ingrown toenail and the special shoes; schedule with them when you schedule the DEXA.     Drink more water!     Work on increasing the good fats in your diet and increase exercise.     Can try stopping at the pharmacy to see what the cost might be to you for tetanus shot.     Work on stretching for calves before bed. 30 sec on each side toe on the wall.     Can see dermatology if you wish.     Patient Education   Personalized Prevention Plan  You are due for the preventive services outlined below.  Your care team is available to assist you in scheduling these services.  If you have already completed any of these items, please share that information with your care team to update in your medical record.  Health Maintenance Due   Topic Date Due     Osteoporosis Screening (Dexa) - every 5 years   10/10/2018     PHQ-2  01/01/2019     Diabetic Foot Exam - yearly  01/10/2019     Annual Wellness Visit  04/24/2019     FALL RISK ASSESSMENT  04/24/2019     Mammogram - yearly  04/25/2019     Eye Exam - yearly  04/27/2019        Patient Education   Personalized Prevention Plan  You are due for the preventive services outlined below.  Your care team is available to assist you in scheduling these services.  If you have already completed any of these items, please share that information with your care team to update in your medical record.  Health Maintenance Due   Topic Date Due     Osteoporosis Screening (Dexa) - every 5 years   10/10/2018     PHQ-2  01/01/2019     Diabetic Foot Exam - yearly  01/10/2019     Annual Wellness Visit  04/24/2019     FALL RISK ASSESSMENT  04/24/2019     Mammogram - yearly  04/25/2019     Eye Exam - yearly  04/27/2019        Patient Education   Personalized Prevention Plan  You are due for the preventive services outlined below.  Your care team is available to assist you in  scheduling these services.  If you have already completed any of these items, please share that information with your care team to update in your medical record.  Health Maintenance Due   Topic Date Due     Osteoporosis Screening (Dexa) - every 5 years   10/10/2018     PHQ-2  01/01/2019     Diabetic Foot Exam - yearly  01/10/2019     Annual Wellness Visit  04/24/2019     FALL RISK ASSESSMENT  04/24/2019     Mammogram - yearly  04/25/2019     Eye Exam - yearly  04/27/2019

## 2019-04-24 ENCOUNTER — OFFICE VISIT (OUTPATIENT)
Dept: PEDIATRICS | Facility: CLINIC | Age: 74
End: 2019-04-24
Payer: COMMERCIAL

## 2019-04-24 VITALS
TEMPERATURE: 98.2 F | BODY MASS INDEX: 36.94 KG/M2 | SYSTOLIC BLOOD PRESSURE: 130 MMHG | DIASTOLIC BLOOD PRESSURE: 72 MMHG | HEART RATE: 104 BPM | OXYGEN SATURATION: 96 % | HEIGHT: 63 IN | WEIGHT: 208.5 LBS

## 2019-04-24 DIAGNOSIS — E66.01 MORBID OBESITY DUE TO EXCESS CALORIES (H): ICD-10-CM

## 2019-04-24 DIAGNOSIS — I10 BENIGN ESSENTIAL HYPERTENSION: ICD-10-CM

## 2019-04-24 DIAGNOSIS — E11.42 TYPE 2 DIABETES MELLITUS WITH DIABETIC POLYNEUROPATHY, WITHOUT LONG-TERM CURRENT USE OF INSULIN (H): ICD-10-CM

## 2019-04-24 DIAGNOSIS — Z12.31 VISIT FOR SCREENING MAMMOGRAM: ICD-10-CM

## 2019-04-24 DIAGNOSIS — E53.8 VITAMIN B12 DEFICIENCY WITHOUT ANEMIA: ICD-10-CM

## 2019-04-24 DIAGNOSIS — I25.10 CORONARY ARTERY DISEASE INVOLVING NATIVE CORONARY ARTERY OF NATIVE HEART WITHOUT ANGINA PECTORIS: ICD-10-CM

## 2019-04-24 DIAGNOSIS — L60.0 INGROWN TOENAIL: ICD-10-CM

## 2019-04-24 DIAGNOSIS — Z00.00 ENCOUNTER FOR MEDICARE ANNUAL WELLNESS EXAM: Primary | ICD-10-CM

## 2019-04-24 DIAGNOSIS — E78.5 HYPERLIPIDEMIA LDL GOAL <70: ICD-10-CM

## 2019-04-24 DIAGNOSIS — J30.1 CHRONIC SEASONAL ALLERGIC RHINITIS DUE TO POLLEN: ICD-10-CM

## 2019-04-24 DIAGNOSIS — Z78.0 ASYMPTOMATIC POSTMENOPAUSAL STATUS: ICD-10-CM

## 2019-04-24 PROCEDURE — 99397 PER PM REEVAL EST PAT 65+ YR: CPT | Performed by: INTERNAL MEDICINE

## 2019-04-24 PROCEDURE — 99214 OFFICE O/P EST MOD 30 MIN: CPT | Mod: 25 | Performed by: INTERNAL MEDICINE

## 2019-04-24 RX ORDER — SIMVASTATIN 20 MG
20 TABLET ORAL AT BEDTIME
Qty: 90 TABLET | Refills: 3 | Status: SHIPPED | OUTPATIENT
Start: 2019-04-24 | End: 2020-04-23

## 2019-04-24 RX ORDER — LANOLIN ALCOHOL/MO/W.PET/CERES
1000 CREAM (GRAM) TOPICAL DAILY
Qty: 100 TABLET | Refills: 3 | Status: SHIPPED | OUTPATIENT
Start: 2019-04-24 | End: 2020-04-23

## 2019-04-24 RX ORDER — LORATADINE 10 MG/1
10 TABLET ORAL DAILY
Qty: 90 TABLET | Refills: 3 | Status: SHIPPED | OUTPATIENT
Start: 2019-04-24 | End: 2020-04-23

## 2019-04-24 ASSESSMENT — ENCOUNTER SYMPTOMS
MYALGIAS: 1
BREAST MASS: 0

## 2019-04-24 ASSESSMENT — MIFFLIN-ST. JEOR: SCORE: 1414.88

## 2019-04-24 ASSESSMENT — ACTIVITIES OF DAILY LIVING (ADL): CURRENT_FUNCTION: NO ASSISTANCE NEEDED

## 2019-04-26 ENCOUNTER — ANCILLARY PROCEDURE (OUTPATIENT)
Dept: MAMMOGRAPHY | Facility: CLINIC | Age: 74
End: 2019-04-26
Attending: INTERNAL MEDICINE
Payer: COMMERCIAL

## 2019-04-26 ENCOUNTER — ANCILLARY PROCEDURE (OUTPATIENT)
Dept: BONE DENSITY | Facility: CLINIC | Age: 74
End: 2019-04-26
Attending: INTERNAL MEDICINE
Payer: COMMERCIAL

## 2019-04-26 DIAGNOSIS — Z12.31 VISIT FOR SCREENING MAMMOGRAM: ICD-10-CM

## 2019-04-26 DIAGNOSIS — Z78.0 ASYMPTOMATIC POSTMENOPAUSAL STATUS: ICD-10-CM

## 2019-04-26 PROCEDURE — 77080 DXA BONE DENSITY AXIAL: CPT | Performed by: FAMILY MEDICINE

## 2019-04-26 PROCEDURE — 77067 SCR MAMMO BI INCL CAD: CPT | Mod: TC

## 2019-04-29 ENCOUNTER — OFFICE VISIT (OUTPATIENT)
Dept: PODIATRY | Facility: CLINIC | Age: 74
End: 2019-04-29
Attending: INTERNAL MEDICINE
Payer: COMMERCIAL

## 2019-04-29 VITALS
BODY MASS INDEX: 36.94 KG/M2 | WEIGHT: 208.5 LBS | HEIGHT: 63 IN | SYSTOLIC BLOOD PRESSURE: 122 MMHG | DIASTOLIC BLOOD PRESSURE: 66 MMHG

## 2019-04-29 DIAGNOSIS — L60.0 ONYCHOCRYPTOSIS: Primary | ICD-10-CM

## 2019-04-29 DIAGNOSIS — E11.9 CONTROLLED TYPE 2 DIABETES MELLITUS WITHOUT COMPLICATION, UNSPECIFIED WHETHER LONG TERM INSULIN USE (H): ICD-10-CM

## 2019-04-29 PROCEDURE — 11719 TRIM NAIL(S) ANY NUMBER: CPT | Performed by: PODIATRIST

## 2019-04-29 PROCEDURE — 99203 OFFICE O/P NEW LOW 30 MIN: CPT | Mod: 25 | Performed by: PODIATRIST

## 2019-04-29 ASSESSMENT — MIFFLIN-ST. JEOR: SCORE: 1414.88

## 2019-04-29 NOTE — PATIENT INSTRUCTIONS
"Thank you for choosing [facility]!    DR. BAUTISTA'S CLINIC LOCATIONS:   MONDAY - [place]  TUESDAY - [place]   [address] [facility]  [address]   [place] [address] [place] [address] [phone]       THURSDAY AM - [place] THURSDAY PM - [facility]   [address] [facility] [address]   [place] [address] [place], [place] [address] [phone]       FRIDAY AM - [facility] SET UP SURGERY: [phone] [address] APPOINTMENTS: [phone]   [place] [address] BILLING QUESTIONS: [phone] FAX NUMBER: [phone]     Follow Up: as needed    FOOT CARE NURSES    [facility]   $55 nails - $10 calluses  [phone]  (Travels to your home) [facility] - $40  [phone]  [email or web address] for locations or they can come to your home   Marco Mcbride DPM  [address] [place] [address] [phone] [facility]  [phone]  (Travels to your home)   [facility]  [address] [place], [place] [id number] [phone] Footworks  [phone]  [place] [facility] [place] [facility]   Yessi Aragon DPM  [address], [place], [place] [id number] [phone] [facility]   [phone]  [facility]   [facility]  [phone]  At [facility]  (does not take BCBS) [facility]   [phone]         DIABETES AND YOUR FEET  Diabetes can result in several problems in the feet including ulcers (open sores) and amputations. Two of the most important reasons why people develop foot problems when they have diabetes is : 1. Neuropathy (loss of feeling)  2. Vascular disease (loss or decrease of blood flow).    Neuropathy is a term used to describe a loss of nerve function.  Patients with diabetes are at risk of developing neuropathy if their sugars continue to run high and are above the normal value. One theory for neuropathy is that the \"extra\" " sugar in the body enters the nerves and is broken down. These by-products build up in the nerve causing it to swell and impairing nerve function. Often times, this can be prevented by controlling your sugars, dieting and exercise.    When a person develops neuropathy, they usually begin to feel numbness or tingling in their feet and sometime in their legs.  Other symptoms may include painful burning or hot feet, tingling or feeling like insects or ants are crawling on your feet or legs.  If the diabetes is sever and the sugars run high for long periods of time, neuropathy can also occur in the hands.    Vascular disease  is a term used to describe a loss or decrease in circulation (blood flow). There is a problem in getting blood and oxygen to areas that need it. Similar to neuropathy, sugars can build up in the walls of the arteries (blood vessels) and cause them to become swollen, thickened and hardened. This decreases the amount of blood that can go to an area that needs it. Though this is common in the legs of diabetic patients, it can also affect other arteries (blood vessels) in the body such as in the heart and eyes.    In the legs, vascular disease usually results in cramping. Patients who develop leg cramps after walking the same distance every time (i.e. One block, half a mile, ect.) need to let their doctors know so that their circulation may be checked. Cramps causing severe pain in the feet and/or legs while sleeping and the cramps go away when you stand or hang your legs off the side of the bed, may also be a sign of poor blood circulation.  Occasional cramping in cold weather or on rare occasions with activity may not be due to poor circulation, but you should inform your doctor.    PREVENTION OF THESE DISEASES  The key to prevention is good blood sugar control. Poor blood sugar control is a big reason many of these problems start. Physical activity (exercise) is a very good way to help decrease your  blood sugars. Exercise can lower your blood sugar, blood pressure, and cholesterol. It also reduces your risk for heart disease and stroke, relieves stress, and strengthens your heart, muscles and bones.  In addition, regular activity helps insulin work better, improves your blood circulation, and keeps your joints flexible. If you're trying to lose weight, a combination of exercise and wise food choices can help you reach your target weight and maintain it.      PAIN MANAGEMENT  1.Blood Sugar Control - Most important  2. Medications such as:  Amytriptylline, duloxetine, gabapentin, lyrica, tramadol  3. Nutritional therapy:  Vitamin B6 (100mg daily), Vitamin B12 (75mcg daily), Vitamin D 2000 IU daily), Alpha-Lipoic Acid (600-1800mg daily), Acetyl-L-Carnitine (500-1000mg TID, L-methyl folate (1500mcg daily)    ** Metformin can block Vitamin B6 and B12 so it is important to supplement**    FOOT CARE RECOMMENDATIONS   1. Wash your feet with lukewarm water and a mild soap and then dry them thoroughly, especially between the toes.     2. Examine your feet daily looking for cuts, corns, blisters, cracks, ect, especially after wearing new shoes. Make sure to look between your toes. If you cannot see the bottom of your feet, set a mirror on the floor and hold your foot over it, or ask a spouse, friend or family member to examine your feet for you. Contact your doctor immediately if new problems are noted or if sores are not healing.     3. Immediately apply moisturizer to the tops and bottoms of your feet, avoiding areas between the toes. Hand lotion (Intesive Care, Rosenda, Eucerin, Neutrogena, Curel, ect) is sufficient unless your doctor prescribes a medicated lotion. Apply sunscreen to your feet when going swimming outside.     4. Use clean comfortable shoes, wear white socks (if you have any bleeding or drainage, you will see it on white socks). Socks should not have thick seams or cut off the circulation around the leg.  Break in new shoes slowly and rotate with older shoes until broken in. Check the inside of your shoes with your hand to look for areas of irritation or objects that may have fallen into your shoes.       5. Keep slippers by the side of your bed for use during the night.     6.  Shoes should be fitted by a professional and should not cause areas of irritation.  Check your feet regularly when wearing a new pair of shoes and replace them as needed.     7.  Talk to your doctor about proper exercise. Exercise and stretching stimulate blood flow to your feet and maintain proper glucose levels.     8.  Monitor your blood glucose level as instructed by your doctor. Notify your doctor immediately if your blood sugar is abnormally high or low.    9. Cut your nails straight across, but then gently round any sharp edges with a cardboard nail file. If you have neuropathy, peripheral vascular disease or cannot see that well to trim your own toenails contact [facility] ([phone]) or [facility] ([phone]).      THINGS TO AVOID DOING   1.  Do not soak your feet if you have an open sore. Use only lukewarm water and always check the temperature with your hand as hot water can easily burn your feet.       2.  Never use a hot water bottle or heating pad on your feet. Also do not apply cold compresses to your feet. With decreased sensation, you could burn or freeze your feet.       3.  Do not apply any of these to your feet:    -  Over the counter medicine for corns or warts    -  Harsh chemicals like boric acid    -  Do not self-treat corns, cuts, blisters or infections. Always consult your doctor.       4.  Do not wear sandals, slippers or walk barefoot, especially on hot sand or concrete or other harsh surfaces.     5.  If you smoke, stop!!!            BODY WEIGHT AND YOUR FEET  The following information is included in the after visit summary for all patients. Body weight can be a sensitive issue to discuss in clinic, but we  think the following information is very important. Although we focus on the feet and ankles, we do support the overall health of our patients.     Many things can cause foot and ankle problems. Foot structure, activity level, foot mechanics and injuries are common causes of pain. One very important issue that often goes unmentioned, is body weight. Extra weight can cause increased stress on muscles, ligaments, bones and tendons. Sometimes just a few extra pounds is all it takes to put one over her/his threshold. Without reducing that stress, it can be difficult to alleviate pain. As Foot & Ankle specialists, our job is addressing the lower extremity problem and possible causes. Regarding extra body weight, we encourage patients to discuss diet and weight management plans with their primary care doctors. It is this team approach that gives you the best opportunity for pain relief and getting you back on your feet.      [facility] has a Comprehensive Weight Management Program. This program includes counseling, education, non-surgical and surgical approaches to weight loss. If you are interested in learning more either talk to you primary care provider or call [phone].

## 2019-04-29 NOTE — PROGRESS NOTES
"Foot & Ankle Surgery  April 29, 2019    CC: \"ingrown toenail\"    I was asked to see Debi Mo regarding the chief complaint by:  Dr. Dietrich    HPI:  Pt is a 74 year old female who presents with above complaint.  Ingrown nail medial R hallux x 6 months.  \"Off and on\"x 6 months but has been worsening since March.  She was seen in  in March, and followed up with another provider.  She ended up being on 2 separate antibiotics, and while the infection is improving, she continues to have discomfort.  \"throbbing, shotting\" pain, 7/10 daily, worse with being on my feet.  Has tried bacitracin and bandaid.  She's also diabetic, 2 most recent A1c's were 7.0 and 6.9.  No paresthesias reported    ROS:   Pos for CC.  The patient denies current nausea, vomiting, chills, fevers, belly pain, calf pain, chest pain or SOB.  Complete remainder of ROS is otherwise neg.    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     Dyspnea on exertion      Gastro-oesophageal reflux disease     in past     Heart attack (H)     possible silent MI or may be developing  cardiomyopathy     Hyperlipidemia LDL goal <100 2/11/2011     Kidney stone 2/11/2011    3 episodes (1993,2012 and presently)     Microalbuminuria 10/15/2014     Mumps      Numbness and tingling     diab neuropathy feet     Palpitations     upon exertion     PONV (postoperative nausea and vomiting)     1993     Reflux      Renal cyst, left 2/11/2011     Snores      Tuberculosis     as a child - negative now     Type 2 diabetes, HbA1c goal < 7% (H) 2/11/2011       SXHX:    Past Surgical History:   Procedure Laterality Date     CARDIAC SURGERY      " ANGIOGRAM     CHOLECYSTECTOMY       COLONOSCOPY       COMBINED CYSTOSCOPY, RETROGRADES, URETEROSCOPY, LASER HOLMIUM LITHOTRIPSY URETER(S), INSERT STENT Bilateral 9/28/2016    Procedure: COMBINED CYSTOSCOPY, RETROGRADES, URETEROSCOPY, LASER HOLMIUM LITHOTRIPSY URETER(S), INSERT STENT;  Surgeon: Lester Orona MD;  Location: [facility] OR     CYSTOSCOPY       CYSTOSCOPY, RETROGRADES, COMBINED  4/17/2013    Procedure: COMBINED CYSTOSCOPY, RETROGRADES;;  Surgeon: Lester Orona MD;  Location: [facility] OR     CYSTOSCOPY, URETEROSCOPY, COMBINED  11/12/2013    Procedure: COMBINED CYSTOSCOPY, URETEROSCOPY;  CYSTOSCOPY, LEFT URETEROSCOPY, LEFT EXTRACORPOREAL SHOCKWAVE LITHOTRIPSY  ;  Surgeon: Lester Orona MD;  Location: [facility] OR     EXTRACORPOREAL SHOCK WAVE LITHOTRIPSY (ESWL)  11/12/2013    Procedure: EXTRACORPOREAL SHOCK WAVE LITHOTRIPSY (ESWL);;  Surgeon: Lester Orona MD;  Location: [facility] OR     EXTRACORPOREAL SHOCK WAVE LITHOTRIPSY (ESWL) Bilateral 4/28/2015    Procedure: EXTRACORPOREAL SHOCK WAVE LITHOTRIPSY (ESWL);  Surgeon: Lester Orona MD;  Location: [facility] OR     EXTRACORPOREAL SHOCK WAVE LITHOTRIPSY, CYSTOSCOPY, INSERT STENT URETER(S), COMBINED  1/10/2012    Procedure:COMBINED EXTRACORPOREAL SHOCK WAVE LITHOTRIPSY, CYSTOSCOPY, INSERT STENT URETER(S); LEFT EXTRACORPOREAL SHOCKWAVE LITHOTRIPSY, LEFT STENT; Surgeon:LESTER ORONA; Location:[facility] OR     GALLBLADDER SURGERY  2009     HYSTERECTOMY VAGINAL  1993    left  the ovaries     LASER HOLMIUM LITHOTRIPSY URETER(S), INSERT STENT, COMBINED  4/17/2013    Procedure: COMBINED CYSTOSCOPY, URETEROSCOPY, LASER HOLMIUM LITHOTRIPSY URETER(S), INSERT STENT;  CYSTOSCOPY, Right URETEROSCOPY, LASER HOLMIUM LITHOTRIPSY URETER(S) standby only,Stone basketing, Right Retrogrades, ;  Surgeon: Lester Orona MD;  Location:  OR     SHOULDER SURGERY  2009    left rotator cuff        MEDS:    Current Outpatient Medications   Medication      aspirin 81 MG tablet     blood glucose monitoring (ONE TOUCH DELICA) lancets     blood glucose monitoring (ONE TOUCH VERIO IQ) test strip     blood glucose monitoring (ONETOUCH VERIO) meter device kit     Cholecalciferol (VITAMIN D3 PO)     Coenzyme Q10 (CO Q 10 PO)     cyanocobalamin (VITAMIN B-12) 1000 MCG tablet     lisinopril (PRINIVIL/ZESTRIL) 20 MG tablet     loratadine (CLARITIN) 10 MG tablet     metFORMIN (GLUCOPHAGE) 500 MG tablet     potassium citrate (UROCIT-K) 10 MEQ (1080 MG) CR tablet     simvastatin (ZOCOR) 20 MG tablet     No current facility-administered medications for this visit.        ALL:     Allergies   Allergen Reactions     Dust Mites      Seasonal Allergies        FMH:    Family History   Problem Relation Age of Onset     Respiratory Mother         CHF     Alzheimer Disease Mother         Dementia     Heart Disease Father         Heart Attack     Respiratory Father         CHF     Breast Cancer Maternal Grandmother      Breast Cancer Paternal Grandmother      Arthritis Brother      Arthritis Sister      Hypertension Son      Diabetes Daughter      Arthritis Sister      Arthritis Sister        SocHx:    Social History     Socioeconomic History     Marital status:      Spouse name: Not on file     Number of children: Not on file     Years of education: Not on file     Highest education level: Not on file   Occupational History     Not on file   Social Needs     Financial resource strain: Not on file     Food insecurity:     Worry: Not on file     Inability: Not on file     Transportation needs:     Medical: Not on file     Non-medical: Not on file   Tobacco Use     Smoking status: Never Smoker     Smokeless tobacco: Never Used   Substance and Sexual Activity     Alcohol use: No     Drug use: No     Sexual activity: Never   Lifestyle     Physical activity:     Days per week: Not on file     Minutes per session: Not on file     Stress: Not on file   Relationships     Social connections:      Talks on phone: Not on file     Gets together: Not on file     Attends Scientologist service: Not on file     Active member of club or organization: Not on file     Attends meetings of clubs or organizations: Not on file     Relationship status: Not on file     Intimate partner violence:     Fear of current or ex partner: Not on file     Emotionally abused: Not on file     Physically abused: Not on file     Forced sexual activity: Not on file   Other Topics Concern     Parent/sibling w/ CABG, MI or angioplasty before 65F 55M? No   Social History Narrative     Not on file           EXAMINATION:  Gen:   No apparent distress  Neuro:   A&Ox3, no deficits  Psych:    Answering questions appropriately for age and situation with normal affect  Head:    NCAT  Eye:    Visual scanning without deficit  Ear:    Response to auditory stimuli wnl  Lung:    Non-labored breathing on RA noted  Abd:    NTND per patient report  Lymph:    Neg for pitting/non-pitting edema BLE  Vasc:    Pulses palpable, CFT minimally delayed  Neuro:    Light touch sensation intact to all sensory nerve distributions without paresthesias  Derm:    Onychocryptosis bilateral border lateral > medial, but pain medial > lateral.  Mild inflammation medially but no cellulitis or drainage  MSK:    ROM, strength wnl without limitation, no pain on palpation noted.  Calf:    Neg for redness, swelling or tenderness    Assessment:  74 year old female with onychocryptosis lateral > medial but pain medial > lateral R great toe; DMII without neuropathy      Plan:  Discussed etiologies, anatomy and options  1.  Onychocryptosis bilateral border L great toe  -slantbacks performed, some residual discomfort afterwards.  No  Charge.  -abx ointment and bandaid applied. I don't anticipate any further wound care needed  -no indication for PO abx  -discussed avulsion options if above plan and routine nail debridement insufficient  -nail care handout dispensed for routine nail  care    2.  DMII with neuropathy   -Regarding the patient's diabetes, we dispensed and discussed proper diabetic foot care.  This includes closely monitoring their blood sugars, closely monitoring their blood pressures, and evaluating their feet at least once per day.  We also discussed potential problems associated with barefoot/sock ambulation and soaking their feet.        Follow up:  prn or sooner with acute issues      Patient's medical history was reviewed today    Body mass index is 36.93 kg/m .  Weight management plan: Patient was referred to their PCP to discuss a diet and exercise plan.        Shlomo Booth DPM [facility] FACFAOM  Podiatric Foot & Ankle Surgeon  [facility]  [phone]

## 2019-05-03 ENCOUNTER — TRANSFERRED RECORDS (OUTPATIENT)
Dept: HEALTH INFORMATION MANAGEMENT | Facility: CLINIC | Age: 74
End: 2019-05-03

## 2019-05-03 LAB — RETINOPATHY: NEGATIVE

## 2019-06-08 ENCOUNTER — HOSPITAL ENCOUNTER (EMERGENCY)
Facility: CLINIC | Age: 74
Discharge: HOME | End: 2019-06-08
Attending: EMERGENCY MEDICINE | Admitting: EMERGENCY MEDICINE
Payer: COMMERCIAL

## 2019-06-08 ENCOUNTER — APPOINTMENT (OUTPATIENT)
Dept: CT IMAGING | Facility: CLINIC | Age: 74
End: 2019-06-08
Attending: EMERGENCY MEDICINE
Payer: COMMERCIAL

## 2019-06-08 VITALS
DIASTOLIC BLOOD PRESSURE: 71 MMHG | BODY MASS INDEX: 38.09 KG/M2 | SYSTOLIC BLOOD PRESSURE: 146 MMHG | TEMPERATURE: 98.5 F | OXYGEN SATURATION: 98 % | RESPIRATION RATE: 16 BRPM | HEART RATE: 98 BPM | WEIGHT: 215 LBS

## 2019-06-08 DIAGNOSIS — R91.8 PULMONARY NODULES: ICD-10-CM

## 2019-06-08 DIAGNOSIS — N20.0 KIDNEY STONE: ICD-10-CM

## 2019-06-08 LAB
ALBUMIN UR-MCNC: 10 MG/DL
ANION GAP SERPL CALCULATED.3IONS-SCNC: 11 MMOL/L (ref 3–14)
APPEARANCE UR: CLEAR
BASOPHILS # BLD AUTO: 0 10E9/L (ref 0–0.2)
BASOPHILS NFR BLD AUTO: 0.5 %
BILIRUB UR QL STRIP: NEGATIVE
BUN SERPL-MCNC: 14 MG/DL (ref 7–30)
CALCIUM SERPL-MCNC: 9.5 MG/DL (ref 8.5–10.1)
CHLORIDE SERPL-SCNC: 109 MMOL/L (ref 94–109)
CO2 SERPL-SCNC: 17 MMOL/L (ref 20–32)
COLOR UR AUTO: ABNORMAL
CREAT SERPL-MCNC: 0.92 MG/DL (ref 0.52–1.04)
DIFFERENTIAL METHOD BLD: NORMAL
EOSINOPHIL # BLD AUTO: 0.1 10E9/L (ref 0–0.7)
EOSINOPHIL NFR BLD AUTO: 1.6 %
ERYTHROCYTE [DISTWIDTH] IN BLOOD BY AUTOMATED COUNT: 13.8 % (ref 10–15)
GFR SERPL CREATININE-BSD FRML MDRD: 61 ML/MIN/{1.73_M2}
GLUCOSE SERPL-MCNC: 233 MG/DL (ref 70–99)
GLUCOSE UR STRIP-MCNC: 1000 MG/DL
HCT VFR BLD AUTO: 38.7 % (ref 35–47)
HGB BLD-MCNC: 12.5 G/DL (ref 11.7–15.7)
HGB UR QL STRIP: ABNORMAL
IMM GRANULOCYTES # BLD: 0.1 10E9/L (ref 0–0.4)
IMM GRANULOCYTES NFR BLD: 1.6 %
KETONES UR STRIP-MCNC: 10 MG/DL
LEUKOCYTE ESTERASE UR QL STRIP: ABNORMAL
LYMPHOCYTES # BLD AUTO: 2.1 10E9/L (ref 0.8–5.3)
LYMPHOCYTES NFR BLD AUTO: 29 %
MCH RBC QN AUTO: 28.2 PG (ref 26.5–33)
MCHC RBC AUTO-ENTMCNC: 32.3 G/DL (ref 31.5–36.5)
MCV RBC AUTO: 87 FL (ref 78–100)
MONOCYTES # BLD AUTO: 0.4 10E9/L (ref 0–1.3)
MONOCYTES NFR BLD AUTO: 5.2 %
NEUTROPHILS # BLD AUTO: 4.6 10E9/L (ref 1.6–8.3)
NEUTROPHILS NFR BLD AUTO: 62.1 %
NITRATE UR QL: NEGATIVE
NRBC # BLD AUTO: 0 10*3/UL
NRBC BLD AUTO-RTO: 0 /100
PH UR STRIP: 5 PH (ref 5–7)
PLATELET # BLD AUTO: 196 10E9/L (ref 150–450)
POTASSIUM SERPL-SCNC: 4.2 MMOL/L (ref 3.4–5.3)
RBC # BLD AUTO: 4.44 10E12/L (ref 3.8–5.2)
RBC #/AREA URNS AUTO: 33 /HPF (ref 0–2)
SODIUM SERPL-SCNC: 137 MMOL/L (ref 133–144)
SOURCE: ABNORMAL
SP GR UR STRIP: 1.02 (ref 1–1.03)
SQUAMOUS #/AREA URNS AUTO: <1 /HPF (ref 0–1)
UROBILINOGEN UR STRIP-MCNC: NORMAL MG/DL (ref 0–2)
WBC # BLD AUTO: 7.4 10E9/L (ref 4–11)
WBC #/AREA URNS AUTO: 4 /HPF (ref 0–5)

## 2019-06-08 PROCEDURE — 99285 EMERGENCY DEPT VISIT HI MDM: CPT | Mod: 25

## 2019-06-08 PROCEDURE — 74176 CT ABD & PELVIS W/O CONTRAST: CPT

## 2019-06-08 PROCEDURE — 85025 COMPLETE CBC W/AUTO DIFF WBC: CPT | Performed by: EMERGENCY MEDICINE

## 2019-06-08 PROCEDURE — 80048 BASIC METABOLIC PNL TOTAL CA: CPT | Performed by: EMERGENCY MEDICINE

## 2019-06-08 PROCEDURE — 81001 URINALYSIS AUTO W/SCOPE: CPT | Performed by: EMERGENCY MEDICINE

## 2019-06-08 PROCEDURE — 96375 TX/PRO/DX INJ NEW DRUG ADDON: CPT

## 2019-06-08 PROCEDURE — 25000128 H RX IP 250 OP 636: Performed by: EMERGENCY MEDICINE

## 2019-06-08 PROCEDURE — 96374 THER/PROPH/DIAG INJ IV PUSH: CPT

## 2019-06-08 RX ORDER — OXYCODONE HYDROCHLORIDE 5 MG/1
5-10 TABLET ORAL EVERY 6 HOURS PRN
Qty: 12 TABLET | Refills: 0 | Status: SHIPPED | OUTPATIENT
Start: 2019-06-08 | End: 2019-10-25

## 2019-06-08 RX ORDER — ONDANSETRON 4 MG/1
4 TABLET, ORALLY DISINTEGRATING ORAL EVERY 8 HOURS PRN
Qty: 10 TABLET | Refills: 0 | Status: SHIPPED | OUTPATIENT
Start: 2019-06-08 | End: 2019-09-05

## 2019-06-08 RX ORDER — ONDANSETRON 2 MG/ML
4 INJECTION INTRAMUSCULAR; INTRAVENOUS ONCE
Status: COMPLETED | OUTPATIENT
Start: 2019-06-08 | End: 2019-06-08

## 2019-06-08 RX ORDER — HYDROMORPHONE HCL/0.9% NACL/PF 0.2MG/0.2
0.2 SYRINGE (ML) INTRAVENOUS
Status: DISCONTINUED | OUTPATIENT
Start: 2019-06-08 | End: 2019-06-08 | Stop reason: HOSPADM

## 2019-06-08 RX ORDER — KETOROLAC TROMETHAMINE 15 MG/ML
10 INJECTION, SOLUTION INTRAMUSCULAR; INTRAVENOUS ONCE
Status: COMPLETED | OUTPATIENT
Start: 2019-06-08 | End: 2019-06-08

## 2019-06-08 RX ORDER — HYDROMORPHONE HYDROCHLORIDE 1 MG/ML
0.5 INJECTION, SOLUTION INTRAMUSCULAR; INTRAVENOUS; SUBCUTANEOUS ONCE
Status: COMPLETED | OUTPATIENT
Start: 2019-06-08 | End: 2019-06-08

## 2019-06-08 RX ADMIN — ONDANSETRON 4 MG: 2 INJECTION INTRAMUSCULAR; INTRAVENOUS at 05:43

## 2019-06-08 RX ADMIN — HYDROMORPHONE HYDROCHLORIDE 0.5 MG: 1 INJECTION, SOLUTION INTRAMUSCULAR; INTRAVENOUS; SUBCUTANEOUS at 05:43

## 2019-06-08 RX ADMIN — KETOROLAC TROMETHAMINE 10 MG: 15 INJECTION, SOLUTION INTRAMUSCULAR; INTRAVENOUS at 06:04

## 2019-06-08 ASSESSMENT — ENCOUNTER SYMPTOMS
DYSURIA: 0
DIARRHEA: 0
ABDOMINAL PAIN: 1
DIFFICULTY URINATING: 0
NAUSEA: 1
VOMITING: 1
BLOOD IN STOOL: 0
ANAL BLEEDING: 0
CONSTIPATION: 0
FREQUENCY: 0
FLANK PAIN: 1

## 2019-06-08 NOTE — ED PROVIDER NOTES
History     Chief Complaint:  Left Flank & Abdominal Pain    HPI   Debi Mo is a 74 year old female with a history of multiple kidney stones and left renal cyst who presents with left flank and left lower quadrant abdominal pain. The patient reports that this morning she woke up at 0300 with left flank pain that started to radiate into her left lower quadrant with time. The patient additionally has had associated nausea and vomiting this morning as well. She states that these symptoms are consistent with her past kidney stones, with her most recent one being 3 years ago. The patient adds that she has required interventions for the passage of her kidney stones  in the past. The patient otherwise denies any bowel or bladder changes. She states that she took 2 ibuprofen and 1 hydrocodone leftover from her last kidney stone prior to arrival to the ED this morning.     Allergies:  Dust Mites  Seasonal Allergies     Medications:    Aspirin 81 mg tablet  Vitamin D3 PO  Co Q 10 PO  Vitamin B-12  Lisinopril  Claritin  Glucophage  Urocit-K 1080 mg  Zocor    Past Medical History:    Anemia  Arthritis  Chronic pain  COPD  CAD  Dyspnea on exertion  GERD  MI  Hyperlipidemia  Hypertension  Microalbuminuria  Mumps  Palpitations  PONV  Left renal cyst  Snores  Tuberculosis  Type 2 diabetes  Seasonal allergic rhinitis  Peripheral neuropathy  LBBB (left bundle branch block)  Vitamin D deficiency  Vitamin B12 deficiency without anemia  Kidney stones    Past Surgical History:    Angiogram  Cholecystectomy  Colonoscopy  Cystoscopy  Combined Cystoscopy, retrogrades, ureteroscopy, laser holmium lithotripsy ureter, insert stent  Cystoscopy, retrogrades, combined  ESWL x 2  ESWL, cystoscopy, insert stent ureter, combined  Vaginal Hysterectomy  Laser holmium lithotripsy ureter, insert stent, combined  Left rotator cuff repair    Family History:    CHF  Dementia  MI  Breast Cancer  Arthritis  Hypertension  Diabetes    Social  History:  Smoking Status: Never Smoker  Alcohol Use: No  Patient presents with her son.  Marital Status:       Review of Systems   Gastrointestinal: Positive for abdominal pain, nausea and vomiting. Negative for anal bleeding, blood in stool, constipation and diarrhea.   Genitourinary: Positive for flank pain. Negative for decreased urine volume, difficulty urinating, dysuria, frequency and urgency.   All other systems reviewed and are negative.    Physical Exam     Patient Vitals for the past 24 hrs:   BP Temp Temp src Pulse Heart Rate Resp SpO2 Weight   06/08/19 0527 (!) 152/97 98.5  F (36.9  C) Temporal 85 85 16 98 % 97.5 kg (215 lb)     Physical Exam    HEENT:  mmm  Neck: supple  CV: ppi, regular   Resp: speaking in full sentences with any resp distress  Abd: mild tenderness in the LLQ area  Ext: peripheral edema present:  No  Skin: warm dry well perfused  Neuro: Alert, no gross motor or sensory deficits,  gait stable    Emergency Department Course     Imaging:  Radiographic findings were communicated with the patient who voiced understanding of the findings.    CT Abdomen Pelvis w/o Contrast     HISTORY: Left lower quadrant pain.     COMPARISON: 2/19/2016.     TECHNIQUE: Without intravenous or oral contrast, helical sections were  acquired from the top of the diaphragm through the pubic symphysis.  Coronal reconstructions were generated. Radiation dose for this scan  was reduced using automated exposure control, adjustment of the mA  and/or kV according to the patient's size, or iterative reconstruction  technique. (Renal stone protocol)     FINDINGS:      Right urinary tract: 1.3 x 1.0 cm nonobstructing calculus in the  inferior pole of the kidney. No ureteral calculi. No dilatation of the  intrarenal collecting system or ureter.     Left urinary tract: 0.4 cm calculus in the mid to distal ureter  (series 2, image 70). Mild dilatation of the more proximal ureter and  intrarenal collecting system. Six  nonobstructing calculi in inferior  pole of the kidney, the largest measuring 0.6 cm in diameter. 2.5 cm  cyst in the upper pole of the kidney.     Urinary bladder: No visualized calculi.     Remainder of the abdomen and pelvis: Mild diffuse fatty infiltration  of the liver. The liver, spleen, pancreas and adrenal glands are  otherwise unremarkable to the limits of a noncontrast CT scan. The  gallbladder is present. Small hiatal hernia. The small and large bowel  are normal in caliber. Several colonic diverticula are present without  evidence of diverticulitis. The appendix is unremarkable. No bowel  wall thickening, pneumatosis or free intraperitoneal gas. The uterus  is not visualized. No enlarged lymph nodes or free fluid in the  abdomen or pelvis. Atherosclerotic calcification in the abdominal  aorta.     Scan through the lower chest is significant for a 1 cm nodule in the  right middle lobe (series 2, image 9) that was also present on  2/19/2016 and is therefore consistent with benign etiology. 0.3 cm  nodule in the lateral aspect of the lingula (series 2, image 1) is  within a portion of the lungs that was not imaged on the prior study                                                                      IMPRESSION:   1. 0.4 cm mid to distal left ureteral calculus, resulting in mild  obstruction.  2. A few nonobstructing bilateral renal calculi.  3. Tiny 0.3 cm indeterminate left lung nodule. This is very likely a  benign nodule. Recommend comparison with prior imaging studies, if  available. If not available and the patient is a smoker or has other  significant risk factors, a followup CT scan could be considered in 12  months to confirm stability.     Yamileth BRASHER read by radiology.    Laboratory:  Contains abnormal data UA with Microscopic (Final result)    Component (Lab Inquiry)   Collection Time Result Time COLOR APPEARANCE URINE GLC URINEBILIN URINEKETON Specific Purcell Urine URINE BLOO pH Urine  Protein Albumin Urine Urobilinogen mg/dL   06/08/19 06:36:00 06/08/19 06:59:52 Light Yellow Clear 1000Abnormal  Negative 10Abnormal  1.016 ModerateAbnormal  5.0 10Abnormal  Normal       Collection Time Result Time NITRITE Leukocyte Esterase Urine Source WBC/HPF RBC/HPF Squamous Epithelial /HPF Urine   06/08/19 06:36:00 06/08/19 06:59:52 Negative TraceAbnormal  Midstream Urine 4 33High  <1         Final result                            CBC with platelets differential (Final result)    Component (Lab Inquiry)   Collection Time Result Time WBC RBC HGB HCT MCV MCH MCHC RDW PLT DIFF METHO   06/08/19 05:46:00 06/08/19 05:53:29 7.4 4.44 12.5 38.7 87 28.2 32.3 13.8 196 Automated Method       Collection Time Result Time % Neutrophils % Lymphocytes % Monocytes % Eosinophils % Basophils % Immature Granulocytes Nucleated RBCs Absolute Neutrophil Absolute Lymphocytes Absolute Monocytes   06/08/19 05:46:00 06/08/19 05:53:29 62.1 29.0 5.2 1.6 0.5 1.6 0 4.6 2.1 0.4       Collection Time Result Time Absolute Eosinophils Absolute Basophils Abs Immature Granulocytes Absolute Nucleated RBC   06/08/19 05:46:00 06/08/19 05:53:29 0.1 0.0 0.1 0.0         Final result                          Contains abnormal data Basic metabolic panel (Final result)    Component (Lab Inquiry)   Collection Time Result Time NA POTASSIUM Chloride Carbon Dioxide ANION GAP GLUCOSE BUN CREATININE GFR Estimate GFR Estimate If Black   06/08/19 05:46:00 06/08/19 06:18:36 137 4.2 109 17Low  11 233High  14 0.92 61  Non  G... 71   GFR C...       Collection Time Result Time Calcium   06/08/19 05:46:00 06/08/19 06:18:36 9.5                                       Interventions:  Medications   HYDROmorphone (DILAUDID) injection 0.2 mg (has no administration in time range)   ketorolac (TORADOL) injection 10 mg (has no administration in time range)   HYDROmorphone (PF) (DILAUDID) injection 0.5 mg (0.5 mg Intravenous Given 6/8/19 0543)    ondansetron (ZOFRAN) injection 4 mg (4 mg Intravenous Given 19 0543)     0543: Zofran 4 mg IV  0543: Dilaudid 0.5 mg IV  0604: Toradol 10 mg IV      Emergency Department Course:  Past medical records, nursing notes, and vitals reviewed.  0535: I performed an exam of the patient and obtained history, as documented above.    IV inserted and blood drawn.    The patient was sent for a CT scan while in the emergency department, findings above.         Impression & Plan      Medical Decision Makin-year-old female here with the presentation most consistent with recurrent ureteral obstruction from a stone.  We will do a CT scan, basic blood tests, urinalysis to rule out concomitant infection.  Symptom control with IV antiemetics and analgesics.    4mm distal L stone, no infection, no ARF, sx controlled.  Prefers discharge home.    Diagnosis:    ICD-10-CM    1. Kidney stone N20.0 UA with Microscopic   2. Pulmonary nodules R91.8        Disposition:  discharged to home    Discharge Medications:     Medication List      Started    ondansetron 4 MG ODT tab  Commonly known as:  ZOFRAN ODT  4 mg, Oral, EVERY 8 HOURS PRN     oxyCODONE 5 MG tablet  Commonly known as:  ROXICODONE  5-10 mg, Oral, EVERY 6 HOURS PRN              Fabiola Ackerman  2019   [facility] EMERGENCY DEPARTMENT  I, Fabiola Ackerman, am serving as a scribe at 5:35 AM on 2019 to document services personally performed by Sajan Roldan MD based on my observations and the provider's statements to me.        Sajan Roldan MD  [phone]

## 2019-06-08 NOTE — ED AVS SNAPSHOT
[facility] Emergency Department  [address]  [facility] [address]  Phone:  [phone]  Fax:  [phone]                                    Debi Mo   MRN: [id number]    Department:  [facility] Emergency Department   Date of Visit:  6/8/2019           After Visit Summary Signature Page    I have received my discharge instructions, and my questions have been answered. I have discussed any challenges I see with this plan with the nurse or doctor.    ..........................................................................................................................................  Patient/Patient Representative Signature      ..........................................................................................................................................  Patient Representative Print Name and Relationship to Patient    ..................................................               ................................................  Date                                   Time    ..........................................................................................................................................  Reviewed by Signature/Title    ...................................................              ..............................................  Date                                               Time          22EPIC Rev 08/18

## 2019-06-17 ENCOUNTER — OFFICE VISIT (OUTPATIENT)
Dept: UROLOGY | Facility: CLINIC | Age: 74
End: 2019-06-17
Payer: COMMERCIAL

## 2019-06-17 VITALS
HEIGHT: 63 IN | SYSTOLIC BLOOD PRESSURE: 132 MMHG | DIASTOLIC BLOOD PRESSURE: 70 MMHG | WEIGHT: 215 LBS | BODY MASS INDEX: 38.09 KG/M2 | HEART RATE: 80 BPM

## 2019-06-17 DIAGNOSIS — N20.0 KIDNEY STONE: Primary | ICD-10-CM

## 2019-06-17 PROCEDURE — 99214 OFFICE O/P EST MOD 30 MIN: CPT | Performed by: UROLOGY

## 2019-06-17 PROCEDURE — 99000 SPECIMEN HANDLING OFFICE-LAB: CPT | Performed by: UROLOGY

## 2019-06-17 PROCEDURE — 82365 CALCULUS SPECTROSCOPY: CPT | Mod: 90 | Performed by: UROLOGY

## 2019-06-17 ASSESSMENT — MIFFLIN-ST. JEOR: SCORE: 1444.36

## 2019-06-17 ASSESSMENT — PAIN SCALES - GENERAL: PAINLEVEL: NO PAIN (0)

## 2019-06-17 NOTE — LETTER
6/17/2019       RE: Debi Mo  [address] [place] [address]     Dear Colleague,    Thank you for referring your patient, Debi Mo, to the [facility] [place] [facility]. Please see a copy of my visit note below.    Office Visit Note  [facility]  [phone]    Jun 17, 2019    [unfilled]    1945    UROLOGIC DIAGNOSES:    History of uric acid stones    CURRENT INTERVENTIONS:    Prior stone extractions, potassium citrate    History:    Berna was in the emergency room last week with left-sided flank pain. She was diagnosed with a 4 mm distal left ureteral stone.  She passed a stone within 24 hours and brings it in with her today for analysis. The CT scan also picked up other stones in the kidneys and she is here today to discuss management of her stones.  She is currently asymptomatic since passing stones. She has been taking potassium citrate but says that she often forgets to take the medication. She usually only ends up taking about 20meq daily of the medicine      Imaging:  I reviewed her CT scan images. Small 4 mm stone in the distal left ureter. There is one large stone in each kidney as well, each measuring nearly 1 cm.    Labs:      MEDICATIONS:    Current Outpatient Medications:      aspirin 81 MG tablet, Take 81 mg by mouth daily, Disp: , Rfl:      blood glucose monitoring (ONE TOUCH DELICA) lancets, Use to test blood sugars 1 times daily or as directed., Disp: 100 each, Rfl: 11     blood glucose monitoring (ONE TOUCH VERIO IQ) test strip, Use to test blood sugars 1 times daily or as directed., Disp: 50 each, Rfl: 11     blood glucose monitoring (ONETOUCH VERIO) meter device kit, Use to test blood sugars 1 times daily or as directed., Disp: 1 kit, Rfl: 0     Cholecalciferol (VITAMIN D3 PO), Take 4,000 Units by mouth daily., Disp: , Rfl:      Coenzyme Q10 (CO Q 10 PO), Take 100 mg by mouth  daily , Disp: , Rfl:      cyanocobalamin (VITAMIN B-12) 1000 MCG tablet, Take 1 tablet (1,000 mcg) by mouth daily, Disp: 100 tablet, Rfl: 3     lisinopril (PRINIVIL/ZESTRIL) 20 MG tablet, Take 1 tablet (20 mg) by mouth daily, Disp: 90 tablet, Rfl: 3     loratadine (CLARITIN) 10 MG tablet, Take 1 tablet (10 mg) by mouth daily, Disp: 90 tablet, Rfl: 3     metFORMIN (GLUCOPHAGE) 500 MG tablet, Take 2 tablets (1,000 mg) by mouth 2 times daily (with meals), Disp: 360 tablet, Rfl: 1     oxyCODONE (ROXICODONE) 5 MG tablet, Take 1-2 tablets (5-10 mg) by mouth every 6 hours as needed for pain, Disp: 12 tablet, Rfl: 0     potassium citrate (UROCIT-K) 10 MEQ (1080 MG) CR tablet, TAKE 1 TABLET (10 MEQ) BY MOUTH 3 TIMES DAILY (WITH MEALS), Disp: 270 tablet, Rfl: 3     simvastatin (ZOCOR) 20 MG tablet, Take 1 tablet (20 mg) by mouth At Bedtime, Disp: 90 tablet, Rfl: 3    ALLERGIES:     Allergies   Allergen Reactions     Dust Mites      Seasonal Allergies        REVIEW OF SYSTEMS: Ten point review of systems without change as outlined in HPI    SURGICAL HISTORY:    Past Surgical History:   Procedure Laterality Date     CARDIAC SURGERY      ANGIOGRAM     CHOLECYSTECTOMY       COLONOSCOPY       COMBINED CYSTOSCOPY, RETROGRADES, URETEROSCOPY, LASER HOLMIUM LITHOTRIPSY URETER(S), INSERT STENT Bilateral 9/28/2016    Procedure: COMBINED CYSTOSCOPY, RETROGRADES, URETEROSCOPY, LASER HOLMIUM LITHOTRIPSY URETER(S), INSERT STENT;  Surgeon: Lester Orona MD;  Location: [facility] OR     CYSTOSCOPY       CYSTOSCOPY, RETROGRADES, COMBINED  4/17/2013    Procedure: COMBINED CYSTOSCOPY, RETROGRADES;;  Surgeon: Lester Orona MD;  Location: [facility] OR     CYSTOSCOPY, URETEROSCOPY, COMBINED  11/12/2013    Procedure: COMBINED CYSTOSCOPY, URETEROSCOPY;  CYSTOSCOPY, LEFT URETEROSCOPY, LEFT EXTRACORPOREAL SHOCKWAVE LITHOTRIPSY  ;  Surgeon: Lester Orona MD;  Location:  OR     EXTRACORPOREAL SHOCK WAVE LITHOTRIPSY (ESWL)  11/12/2013     "Procedure: EXTRACORPOREAL SHOCK WAVE LITHOTRIPSY (ESWL);;  Surgeon: Lester Orona MD;  Location: [facility] OR     EXTRACORPOREAL SHOCK WAVE LITHOTRIPSY (ESWL) Bilateral 4/28/2015    Procedure: EXTRACORPOREAL SHOCK WAVE LITHOTRIPSY (ESWL);  Surgeon: Lester Orona MD;  Location: [facility] OR     EXTRACORPOREAL SHOCK WAVE LITHOTRIPSY, CYSTOSCOPY, INSERT STENT URETER(S), COMBINED  1/10/2012    Procedure:COMBINED EXTRACORPOREAL SHOCK WAVE LITHOTRIPSY, CYSTOSCOPY, INSERT STENT URETER(S); LEFT EXTRACORPOREAL SHOCKWAVE LITHOTRIPSY, LEFT STENT; Surgeon:LESTER ORONA; Location:[facility] OR     GALLBLADDER SURGERY  2009     HYSTERECTOMY VAGINAL  1993    left  the ovaries     LASER HOLMIUM LITHOTRIPSY URETER(S), INSERT STENT, COMBINED  4/17/2013    Procedure: COMBINED CYSTOSCOPY, URETEROSCOPY, LASER HOLMIUM LITHOTRIPSY URETER(S), INSERT STENT;  CYSTOSCOPY, Right URETEROSCOPY, LASER HOLMIUM LITHOTRIPSY URETER(S) standby only,Stone basketing, Right Retrogrades, ;  Surgeon: Lester Orona MD;  Location: [facility] OR     SHOULDER SURGERY  2009    left rotator cuff         PHYSICAL EXAM:    /70 (BP Location: Right arm)   Pulse 80   Ht 1.6 m (5' 3\")   Wt 97.5 kg (215 lb)   BMI 38.09 kg/m       Constitutional: Well developed. Conversant and in no acute distress  Eyes: Anicteric sclera, conjunctiva clear, normal extraocular movements  ENT: Normocephalic and atraumatic,   Skin: Warm and dry. No rashes or lesions  Cardiac: No peripheral edema  Back/Flank: Not done  CNS/PNS: Normal musculature and movements, moves all extremities normally  Respiratory: Normal non-labored breathing  Abdomen: Soft nontender and nondistended  Peripheral Vascular: No peripheral edema  Mental Status/Psych: Alert and Oriented x 3. Normal mood and affect      External Genitalia: Not done  Bladder: Not done  Urethra: Not done   Vagina: Not done    Cystoscopy:  Not Done      Urinalysis:  UA RESULTS:  Recent Labs   Lab Test 06/08/19  0636 " 08/25/17  1503   COLOR Light Yellow Yellow   APPEARANCE Clear Clear   URINEGLC 1000* 500*   URINEBILI Negative Negative   URINEKETONE 10* Trace*   SG 1.016 1.020   UBLD Moderate* Trace*   URINEPH 5.0 5.5   PROTEIN 10* Negative   UROBILINOGEN  --  0.2   NITRITE Negative Negative   LEUKEST Trace* Negative   RBCU 33*  --    WBCU 4  --          IMPRESSION:    Bilateral kidney stones    PLAN:    She has successfully passed or ureteral stone. We discussed the remaining kidney stones.  She did undergo a bilateral ureteroscopy to remove the stones. She has done this previously in the past. We discussed this surgery in detail with its risks and expected recovery. We could also  Increase her potassium citrate intake and try to dissolve the stones as we assume is a gas stones again. She would like to try for oral dissolution of possible. I recommended she take 60 meq of the potassium citrate daily (20meq tid). I will see her back in 2 months to check on the pH of her urine and check another CT scan.      Lester Orona M.D.

## 2019-06-17 NOTE — PROGRESS NOTES
Office Visit Note  [facility]  [phone]    Jun 17, 2019    [unfilled]    1945    UROLOGIC DIAGNOSES:    History of uric acid stones    CURRENT INTERVENTIONS:    Prior stone extractions, potassium citrate    History:    Berna was in the emergency room last week with left-sided flank pain. She was diagnosed with a 4 mm distal left ureteral stone.  She passed a stone within 24 hours and brings it in with her today for analysis. The CT scan also picked up other stones in the kidneys and she is here today to discuss management of her stones.  She is currently asymptomatic since passing stones. She has been taking potassium citrate but says that she often forgets to take the medication. She usually only ends up taking about 20meq daily of the medicine      Imaging:  I reviewed her CT scan images. Small 4 mm stone in the distal left ureter. There is one large stone in each kidney as well, each measuring nearly 1 cm.    Labs:      MEDICATIONS:    Current Outpatient Medications:      aspirin 81 MG tablet, Take 81 mg by mouth daily, Disp: , Rfl:      blood glucose monitoring (ONE TOUCH DELICA) lancets, Use to test blood sugars 1 times daily or as directed., Disp: 100 each, Rfl: 11     blood glucose monitoring (ONE TOUCH VERIO IQ) test strip, Use to test blood sugars 1 times daily or as directed., Disp: 50 each, Rfl: 11     blood glucose monitoring (ONETOUCH VERIO) meter device kit, Use to test blood sugars 1 times daily or as directed., Disp: 1 kit, Rfl: 0     Cholecalciferol (VITAMIN D3 PO), Take 4,000 Units by mouth daily., Disp: , Rfl:      Coenzyme Q10 (CO Q 10 PO), Take 100 mg by mouth daily , Disp: , Rfl:      cyanocobalamin (VITAMIN B-12) 1000 MCG tablet, Take 1 tablet (1,000 mcg) by mouth daily, Disp: 100 tablet, Rfl: 3     lisinopril (PRINIVIL/ZESTRIL) 20 MG tablet, Take 1 tablet (20 mg) by mouth daily, Disp: 90 tablet, Rfl: 3     loratadine (CLARITIN) 10 MG tablet, Take 1 tablet (10 mg) by  mouth daily, Disp: 90 tablet, Rfl: 3     metFORMIN (GLUCOPHAGE) 500 MG tablet, Take 2 tablets (1,000 mg) by mouth 2 times daily (with meals), Disp: 360 tablet, Rfl: 1     oxyCODONE (ROXICODONE) 5 MG tablet, Take 1-2 tablets (5-10 mg) by mouth every 6 hours as needed for pain, Disp: 12 tablet, Rfl: 0     potassium citrate (UROCIT-K) 10 MEQ (1080 MG) CR tablet, TAKE 1 TABLET (10 MEQ) BY MOUTH 3 TIMES DAILY (WITH MEALS), Disp: 270 tablet, Rfl: 3     simvastatin (ZOCOR) 20 MG tablet, Take 1 tablet (20 mg) by mouth At Bedtime, Disp: 90 tablet, Rfl: 3    ALLERGIES:     Allergies   Allergen Reactions     Dust Mites      Seasonal Allergies        REVIEW OF SYSTEMS: Ten point review of systems without change as outlined in HPI    SURGICAL HISTORY:    Past Surgical History:   Procedure Laterality Date     CARDIAC SURGERY      ANGIOGRAM     CHOLECYSTECTOMY       COLONOSCOPY       COMBINED CYSTOSCOPY, RETROGRADES, URETEROSCOPY, LASER HOLMIUM LITHOTRIPSY URETER(S), INSERT STENT Bilateral 9/28/2016    Procedure: COMBINED CYSTOSCOPY, RETROGRADES, URETEROSCOPY, LASER HOLMIUM LITHOTRIPSY URETER(S), INSERT STENT;  Surgeon: Lester Orona MD;  Location: [facility]     CYSTOSCOPY       CYSTOSCOPY, RETROGRADES, COMBINED  4/17/2013    Procedure: COMBINED CYSTOSCOPY, RETROGRADES;;  Surgeon: Lester Orona MD;  Location: [facility]     CYSTOSCOPY, URETEROSCOPY, COMBINED  11/12/2013    Procedure: COMBINED CYSTOSCOPY, URETEROSCOPY;  CYSTOSCOPY, LEFT URETEROSCOPY, LEFT EXTRACORPOREAL SHOCKWAVE LITHOTRIPSY  ;  Surgeon: Lester Orona MD;  Location:  OR     EXTRACORPOREAL SHOCK WAVE LITHOTRIPSY (ESWL)  11/12/2013    Procedure: EXTRACORPOREAL SHOCK WAVE LITHOTRIPSY (ESWL);;  Surgeon: Lester Orona MD;  [facility]: [facility]     EXTRACORPOREAL SHOCK WAVE LITHOTRIPSY (ESWL) Bilateral 4/28/2015    Procedure: EXTRACORPOREAL SHOCK WAVE LITHOTRIPSY (ESWL);  Surgeon: Lester Orona MD;  [facility]: [facility]     EXTRACORPOREAL  "SHOCK WAVE LITHOTRIPSY, CYSTOSCOPY, INSERT STENT URETER(S), COMBINED  1/10/2012    Procedure:COMBINED EXTRACORPOREAL SHOCK WAVE LITHOTRIPSY, CYSTOSCOPY, INSERT STENT URETER(S); LEFT EXTRACORPOREAL SHOCKWAVE LITHOTRIPSY, LEFT STENT; Surgeon:LSETER ORONA; Location:[facility] OR     GALLBLADDER SURGERY  2009     HYSTERECTOMY VAGINAL  1993    left  the ovaries     LASER HOLMIUM LITHOTRIPSY URETER(S), INSERT STENT, COMBINED  4/17/2013    Procedure: COMBINED CYSTOSCOPY, URETEROSCOPY, LASER HOLMIUM LITHOTRIPSY URETER(S), INSERT STENT;  CYSTOSCOPY, Right URETEROSCOPY, LASER HOLMIUM LITHOTRIPSY URETER(S) standby only,Stone basketing, Right Retrogrades, ;  Surgeon: Lester Orona MD;  Location: [facility]     SHOULDER SURGERY  2009    left rotator cuff         PHYSICAL EXAM:    /70 (BP Location: Right arm)   Pulse 80   Ht 1.6 m (5' 3\")   Wt 97.5 kg (215 lb)   BMI 38.09 kg/m      Constitutional: Well developed. Conversant and in no acute distress  Eyes: Anicteric sclera, conjunctiva clear, normal extraocular movements  ENT: Normocephalic and atraumatic,   Skin: Warm and dry. No rashes or lesions  Cardiac: No peripheral edema  Back/Flank: Not done  CNS/PNS: Normal musculature and movements, moves all extremities normally  Respiratory: Normal non-labored breathing  Abdomen: Soft nontender and nondistended  Peripheral Vascular: No peripheral edema  Mental Status/Psych: Alert and Oriented x 3. Normal mood and affect      External Genitalia: Not done  Bladder: Not done  Urethra: Not done   Vagina: Not done    Cystoscopy:  Not Done      Urinalysis:  UA RESULTS:  Recent Labs   Lab Test 06/08/19  0636 08/25/17  1503   COLOR Light Yellow Yellow   APPEARANCE Clear Clear   URINEGLC 1000* 500*   URINEBILI Negative Negative   URINEKETONE 10* Trace*   SG 1.016 1.020   UBLD Moderate* Trace*   URINEPH 5.0 5.5   PROTEIN 10* Negative   UROBILINOGEN  --  0.2   NITRITE Negative Negative   LEUKEST Trace* Negative   RBCU 33*  --  "   WBCU 4  --          IMPRESSION:    Bilateral kidney stones    PLAN:    She has successfully passed or ureteral stone. We discussed the remaining kidney stones.  She did undergo a bilateral ureteroscopy to remove the stones. She has done this previously in the past. We discussed this surgery in detail with its risks and expected recovery. We could also  Increase her potassium citrate intake and try to dissolve the stones as we assume uric acid stones again. She would like to try for oral dissolution of possible. I recommended she take 60 meq of the potassium citrate daily (20meq tid). I will see her back in 2 months to check on the pH of her urine and check another CT scan.      Lester Orona M.D.

## 2019-06-18 LAB
APPEARANCE STONE: NORMAL
COMPN STONE: NORMAL
NUMBER STONE: 1
SIZE STONE: NORMAL MM
WT STONE: 70 MG

## 2019-08-19 ENCOUNTER — HOSPITAL ENCOUNTER (OUTPATIENT)
Dept: CT IMAGING | Facility: CLINIC | Age: 74
Discharge: HOME | End: 2019-08-19
Attending: UROLOGY | Admitting: UROLOGY
Payer: COMMERCIAL

## 2019-08-19 ENCOUNTER — OFFICE VISIT (OUTPATIENT)
Dept: UROLOGY | Facility: CLINIC | Age: 74
End: 2019-08-19
Payer: COMMERCIAL

## 2019-08-19 VITALS — BODY MASS INDEX: 38.09 KG/M2 | HEIGHT: 63 IN | HEART RATE: 102 BPM | OXYGEN SATURATION: 97 % | WEIGHT: 215 LBS

## 2019-08-19 DIAGNOSIS — N20.0 KIDNEY STONE: ICD-10-CM

## 2019-08-19 DIAGNOSIS — N20.0 KIDNEY STONE: Primary | ICD-10-CM

## 2019-08-19 LAB
ALBUMIN UR-MCNC: ABNORMAL MG/DL
APPEARANCE UR: CLEAR
BILIRUB UR QL STRIP: NEGATIVE
COLOR UR AUTO: YELLOW
GLUCOSE UR STRIP-MCNC: 100 MG/DL
HGB UR QL STRIP: NEGATIVE
KETONES UR STRIP-MCNC: ABNORMAL MG/DL
LEUKOCYTE ESTERASE UR QL STRIP: NEGATIVE
NITRATE UR QL: NEGATIVE
PH UR STRIP: 5.5 PH (ref 5–7)
SOURCE: ABNORMAL
SP GR UR STRIP: 1.02 (ref 1–1.03)
UROBILINOGEN UR STRIP-ACNC: 0.2 EU/DL (ref 0.2–1)

## 2019-08-19 PROCEDURE — 81003 URINALYSIS AUTO W/O SCOPE: CPT | Mod: QW | Performed by: UROLOGY

## 2019-08-19 PROCEDURE — 99214 OFFICE O/P EST MOD 30 MIN: CPT | Performed by: UROLOGY

## 2019-08-19 PROCEDURE — 74176 CT ABD & PELVIS W/O CONTRAST: CPT

## 2019-08-19 RX ORDER — CEFAZOLIN SODIUM 1 G/50ML
1 INJECTION, SOLUTION INTRAVENOUS SEE ADMIN INSTRUCTIONS
Status: CANCELLED | OUTPATIENT
Start: 2019-08-19

## 2019-08-19 RX ORDER — CEFAZOLIN SODIUM 2 G/50ML
2 SOLUTION INTRAVENOUS
Status: CANCELLED | OUTPATIENT
Start: 2019-08-19

## 2019-08-19 ASSESSMENT — PAIN SCALES - GENERAL: PAINLEVEL: NO PAIN (0)

## 2019-08-19 ASSESSMENT — MIFFLIN-ST. JEOR: SCORE: 1444.36

## 2019-08-19 NOTE — PROGRESS NOTES
Office Visit Note  [facility]  [phone]    Aug 19, 2019    [unfilled]    1945    UROLOGIC DIAGNOSES:    History of calcium and uric acid kidney stones    CURRENT INTERVENTIONS:    Prior stone extractions, potassium citrate    History:    Berna returns to clinic today for kidney stone follow-up. The last kidney stone she passed was composed mainly of calcium oxalate with only 20% uric acid      Imaging:  I reviewed her CT scan images from today. No changes in the stones. One large stone lower pole right kidney, 3 stones in the lower pole the left kidney.    Labs:      MEDICATIONS:    Current Outpatient Medications:      aspirin 81 MG tablet, Take 81 mg by mouth daily, Disp: , Rfl:      blood glucose monitoring (ONE TOUCH DELICA) lancets, Use to test blood sugars 1 times daily or as directed., Disp: 100 each, Rfl: 11     blood glucose monitoring (ONE TOUCH VERIO IQ) test strip, Use to test blood sugars 1 times daily or as directed., Disp: 50 each, Rfl: 11     blood glucose monitoring (ONETOUCH VERIO) meter device kit, Use to test blood sugars 1 times daily or as directed., Disp: 1 kit, Rfl: 0     Cholecalciferol (VITAMIN D3 PO), Take 4,000 Units by mouth daily., Disp: , Rfl:      Coenzyme Q10 (CO Q 10 PO), Take 100 mg by mouth daily , Disp: , Rfl:      cyanocobalamin (VITAMIN B-12) 1000 MCG tablet, Take 1 tablet (1,000 mcg) by mouth daily, Disp: 100 tablet, Rfl: 3     lisinopril (PRINIVIL/ZESTRIL) 20 MG tablet, Take 1 tablet (20 mg) by mouth daily, Disp: 90 tablet, Rfl: 3     loratadine (CLARITIN) 10 MG tablet, Take 1 tablet (10 mg) by mouth daily, Disp: 90 tablet, Rfl: 3     metFORMIN (GLUCOPHAGE) 500 MG tablet, Take 2 tablets (1,000 mg) by mouth 2 times daily (with meals), Disp: 360 tablet, Rfl: 1     simvastatin (ZOCOR) 20 MG tablet, Take 1 tablet (20 mg) by mouth At Bedtime, Disp: 90 tablet, Rfl: 3     oxyCODONE (ROXICODONE) 5 MG tablet, Take 1-2 tablets (5-10 mg) by mouth every 6 hours as  needed for pain (Patient not taking: Reported on 8/19/2019), Disp: 12 tablet, Rfl: 0     potassium citrate (UROCIT-K) 10 MEQ (1080 MG) CR tablet, TAKE 1 TABLET (10 MEQ) BY MOUTH 3 TIMES DAILY (WITH MEALS), Disp: 270 tablet, Rfl: 3    ALLERGIES:     Allergies   Allergen Reactions     Dust Mites      Seasonal Allergies        REVIEW OF SYSTEMS: Ten point review of systems without change as outlined in HPI    SURGICAL HISTORY:    Past Surgical History:   Procedure Laterality Date     CARDIAC SURGERY      ANGIOGRAM     CHOLECYSTECTOMY       COLONOSCOPY       COMBINED CYSTOSCOPY, RETROGRADES, URETEROSCOPY, LASER HOLMIUM LITHOTRIPSY URETER(S), INSERT STENT Bilateral 9/28/2016    Procedure: COMBINED CYSTOSCOPY, RETROGRADES, URETEROSCOPY, LASER HOLMIUM LITHOTRIPSY URETER(S), INSERT STENT;  Surgeon: Lester Orona MD;  Location: [facility]     CYSTOSCOPY       CYSTOSCOPY, RETROGRADES, COMBINED  4/17/2013    Procedure: COMBINED CYSTOSCOPY, RETROGRADES;;  Surgeon: Lester Orona MD;  Location:  OR     CYSTOSCOPY, URETEROSCOPY, COMBINED  11/12/2013    Procedure: COMBINED CYSTOSCOPY, URETEROSCOPY;  CYSTOSCOPY, LEFT URETEROSCOPY, LEFT EXTRACORPOREAL SHOCKWAVE LITHOTRIPSY  ;  Surgeon: Lester Orona MD;  Location:  OR     EXTRACORPOREAL SHOCK WAVE LITHOTRIPSY (ESWL)  11/12/2013    Procedure: EXTRACORPOREAL SHOCK WAVE LITHOTRIPSY (ESWL);;  Surgeon: Lester Orona MD;  Location:  OR     EXTRACORPOREAL SHOCK WAVE LITHOTRIPSY (ESWL) Bilateral 4/28/2015    Procedure: EXTRACORPOREAL SHOCK WAVE LITHOTRIPSY (ESWL);  Surgeon: Lester Orona MD;  Location:  OR     EXTRACORPOREAL SHOCK WAVE LITHOTRIPSY, CYSTOSCOPY, INSERT STENT URETER(S), COMBINED  1/10/2012    Procedure:COMBINED EXTRACORPOREAL SHOCK WAVE LITHOTRIPSY, CYSTOSCOPY, INSERT STENT URETER(S); LEFT EXTRACORPOREAL SHOCKWAVE LITHOTRIPSY, LEFT STENT; Surgeon:LESTER ORONA; Location: OR     GALLBLADDER SURGERY  2009      "HYSTERECTOMY VAGINAL  1993    left  the ovaries     LASER HOLMIUM LITHOTRIPSY URETER(S), INSERT STENT, COMBINED  4/17/2013    Procedure: COMBINED CYSTOSCOPY, URETEROSCOPY, LASER HOLMIUM LITHOTRIPSY URETER(S), INSERT STENT;  CYSTOSCOPY, Right URETEROSCOPY, LASER HOLMIUM LITHOTRIPSY URETER(S) standby only,Stone basketing, Right Retrogrades, ;  Surgeon: Lester Oroan MD;  Location: [facility] OR     SHOULDER SURGERY  2009    left rotator cuff         PHYSICAL EXAM:    Pulse 102   Ht 1.6 m (5' 3\")   Wt 97.5 kg (215 lb)   SpO2 97%   BMI 38.09 kg/m      Constitutional: Well developed. Conversant and in no acute distress  Eyes: Anicteric sclera, conjunctiva clear, normal extraocular movements  ENT: Normocephalic and atraumatic,   Skin: Warm and dry. No rashes or lesions  Cardiac: No peripheral edema  Back/Flank: Not done  CNS/PNS: Normal musculature and movements, moves all extremities normally  Respiratory: Normal non-labored breathing  Abdomen: Soft nontender and nondistended  Peripheral Vascular: No peripheral edema  Mental Status/Psych: Alert and Oriented x 3. Normal mood and affect      External Genitalia: Not done  Bladder: Not done  Urethra: Not done   Vagina: Not done    Cystoscopy:  Not Done      Urinalysis:  UA RESULTS:  Recent Labs   Lab Test 06/08/19  0636 08/25/17  1503   COLOR Light Yellow Yellow   APPEARANCE Clear Clear   URINEGLC 1000* 500*   URINEBILI Negative Negative   URINEKETONE 10* Trace*   SG 1.016 1.020   UBLD Moderate* Trace*   URINEPH 5.0 5.5   PROTEIN 10* Negative   UROBILINOGEN  --  0.2   NITRITE Negative Negative   LEUKEST Trace* Negative   RBCU 33*  --    WBCU 4  --          IMPRESSION:    Bilateral kidney stones    PLAN:    She has bilateral kidney stones are unchanged.  The stones were too large and would not be expected in the past on their own.She has had both  Shockwave lithotripsy and ureteroscopy previously. We discussed the options of observation versus shockwave lithotripsy " versus ureteroscopy. She wishes to proceed with bilateral ureteroscopy. We discussed cystoscopy with bilateral ureteroscopy, holmium laser lithotripsy, stone basketing and stent placement on each side.  Risks and expected recovery were discussed. She wishes to proceed. We will get her scheduled as an outpatient in the near future.      Lester Orona M.D.

## 2019-08-19 NOTE — LETTER
8/19/2019       RE: Debi Mo  [address] [place] [address]     Dear Colleague,    Thank you for referring your patient, Debi Mo, to the [facility] [place] [facility]. Please see a copy of my visit note below.    Office Visit Note  [facility]  [phone]    Aug 19, 2019    [unfilled]    1945    UROLOGIC DIAGNOSES:    History of calcium and uric acid kidney stones    CURRENT INTERVENTIONS:    Prior stone extractions, potassium citrate    History:    Berna returns to clinic today for kidney stone follow-up. The last kidney stone she passed was composed mainly of calcium oxalate with only 20% uric acid      Imaging:  I reviewed her CT scan images from today. No changes in the stones. One large stone lower pole right kidney, 3 stones in the lower pole the left kidney.    Labs:      MEDICATIONS:    Current Outpatient Medications:      aspirin 81 MG tablet, Take 81 mg by mouth daily, Disp: , Rfl:      blood glucose monitoring (ONE TOUCH DELICA) lancets, Use to test blood sugars 1 times daily or as directed., Disp: 100 each, Rfl: 11     blood glucose monitoring (ONE TOUCH VERIO IQ) test strip, Use to test blood sugars 1 times daily or as directed., Disp: 50 each, Rfl: 11     blood glucose monitoring (ONETOUCH VERIO) meter device kit, Use to test blood sugars 1 times daily or as directed., Disp: 1 kit, Rfl: 0     Cholecalciferol (VITAMIN D3 PO), Take 4,000 Units by mouth daily., Disp: , Rfl:      Coenzyme Q10 (CO Q 10 PO), Take 100 mg by mouth daily , Disp: , Rfl:      cyanocobalamin (VITAMIN B-12) 1000 MCG tablet, Take 1 tablet (1,000 mcg) by mouth daily, Disp: 100 tablet, Rfl: 3     lisinopril (PRINIVIL/ZESTRIL) 20 MG tablet, Take 1 tablet (20 mg) by mouth daily, Disp: 90 tablet, Rfl: 3     loratadine (CLARITIN) 10 MG tablet, Take 1 tablet (10 mg) by mouth daily, Disp: 90 tablet, Rfl: 3     metFORMIN  (GLUCOPHAGE) 500 MG tablet, Take 2 tablets (1,000 mg) by mouth 2 times daily (with meals), Disp: 360 tablet, Rfl: 1     simvastatin (ZOCOR) 20 MG tablet, Take 1 tablet (20 mg) by mouth At Bedtime, Disp: 90 tablet, Rfl: 3     oxyCODONE (ROXICODONE) 5 MG tablet, Take 1-2 tablets (5-10 mg) by mouth every 6 hours as needed for pain (Patient not taking: Reported on 8/19/2019), Disp: 12 tablet, Rfl: 0     potassium citrate (UROCIT-K) 10 MEQ (1080 MG) CR tablet, TAKE 1 TABLET (10 MEQ) BY MOUTH 3 TIMES DAILY (WITH MEALS), Disp: 270 tablet, Rfl: 3    ALLERGIES:     Allergies   Allergen Reactions     Dust Mites      Seasonal Allergies        REVIEW OF SYSTEMS: Ten point review of systems without change as outlined in HPI    SURGICAL HISTORY:    Past Surgical History:   Procedure Laterality Date     CARDIAC SURGERY      ANGIOGRAM     CHOLECYSTECTOMY       COLONOSCOPY       COMBINED CYSTOSCOPY, RETROGRADES, URETEROSCOPY, LASER HOLMIUM LITHOTRIPSY URETER(S), INSERT STENT Bilateral 9/28/2016    Procedure: COMBINED CYSTOSCOPY, RETROGRADES, URETEROSCOPY, LASER HOLMIUM LITHOTRIPSY URETER(S), INSERT STENT;  Surgeon: Lester Orona MD;  Location: [facility]     CYSTOSCOPY       CYSTOSCOPY, RETROGRADES, COMBINED  4/17/2013    Procedure: COMBINED CYSTOSCOPY, RETROGRADES;;  Surgeon: Lester Orona MD;  Location: [facility]     CYSTOSCOPY, URETEROSCOPY, COMBINED  11/12/2013    Procedure: COMBINED CYSTOSCOPY, URETEROSCOPY;  CYSTOSCOPY, LEFT URETEROSCOPY, LEFT EXTRACORPOREAL SHOCKWAVE LITHOTRIPSY  ;  Surgeon: Lester Orona MD;  Location:  OR     EXTRACORPOREAL SHOCK WAVE LITHOTRIPSY (ESWL)  11/12/2013    Procedure: EXTRACORPOREAL SHOCK WAVE LITHOTRIPSY (ESWL);;  Surgeon: Lester Orona MD;  [facility]     EXTRACORPOREAL SHOCK WAVE LITHOTRIPSY (ESWL) Bilateral 4/28/2015    Procedure: EXTRACORPOREAL SHOCK WAVE LITHOTRIPSY (ESWL);  Surgeon: Lester Orona MD;  Location:  OR     EXTRACORPOREAL SHOCK  "WAVE LITHOTRIPSY, CYSTOSCOPY, INSERT STENT URETER(S), COMBINED  1/10/2012    Procedure:COMBINED EXTRACORPOREAL SHOCK WAVE LITHOTRIPSY, CYSTOSCOPY, INSERT STENT URETER(S); LEFT EXTRACORPOREAL SHOCKWAVE LITHOTRIPSY, LEFT STENT; Surgeon:LESTER ORONA; Location:[facility]     GALLBLADDER SURGERY  2009     HYSTERECTOMY VAGINAL  1993    left  the ovaries     LASER HOLMIUM LITHOTRIPSY URETER(S), INSERT STENT, COMBINED  4/17/2013    Procedure: COMBINED CYSTOSCOPY, URETEROSCOPY, LASER HOLMIUM LITHOTRIPSY URETER(S), INSERT STENT;  CYSTOSCOPY, Right URETEROSCOPY, LASER HOLMIUM LITHOTRIPSY URETER(S) standby only,Stone basketing, Right Retrogrades, ;  Surgeon: Lester Orona MD;  Location: [facility]     SHOULDER SURGERY  2009    left rotator cuff         PHYSICAL EXAM:    Pulse 102   Ht 1.6 m (5' 3\")   Wt 97.5 kg (215 lb)   SpO2 97%   BMI 38.09 kg/m       Constitutional: Well developed. Conversant and in no acute distress  Eyes: Anicteric sclera, conjunctiva clear, normal extraocular movements  ENT: Normocephalic and atraumatic,   Skin: Warm and dry. No rashes or lesions  Cardiac: No peripheral edema  Back/Flank: Not done  CNS/PNS: Normal musculature and movements, moves all extremities normally  Respiratory: Normal non-labored breathing  Abdomen: Soft nontender and nondistended  Peripheral Vascular: No peripheral edema  Mental Status/Psych: Alert and Oriented x 3. Normal mood and affect      External Genitalia: Not done  Bladder: Not done  Urethra: Not done   Vagina: Not done    Cystoscopy:  Not Done      Urinalysis:  UA RESULTS:  Recent Labs   Lab Test 06/08/19  0636 08/25/17  1503   COLOR Light Yellow Yellow   APPEARANCE Clear Clear   URINEGLC 1000* 500*   URINEBILI Negative Negative   URINEKETONE 10* Trace*   SG 1.016 1.020   UBLD Moderate* Trace*   URINEPH 5.0 5.5   PROTEIN 10* Negative   UROBILINOGEN  --  0.2   NITRITE Negative Negative   LEUKEST Trace* Negative   RBCU 33*  --    WBCU 4  --  "         IMPRESSION:    Bilateral kidney stones    PLAN:    She has bilateral kidney stones are unchanged.  The stones were too large and would not be expected in the past on their own.She has had both  Shockwave lithotripsy and ureteroscopy previously. We discussed the options of observation versus shockwave lithotripsy versus ureteroscopy. She wishes to proceed with bilateral ureteroscopy. We discussed cystoscopy with bilateral ureteroscopy, holmium laser lithotripsy, stone basketing and stent placement on each side.  Risks and expected recovery were discussed. She wishes to proceed. We will get her scheduled as an outpatient in the near future.      Lester Orona M.D.

## 2019-09-11 ENCOUNTER — OFFICE VISIT (OUTPATIENT)
Dept: FAMILY MEDICINE | Facility: CLINIC | Age: 74
End: 2019-09-11
Payer: COMMERCIAL

## 2019-09-11 VITALS
BODY MASS INDEX: 36.86 KG/M2 | RESPIRATION RATE: 16 BRPM | HEIGHT: 63 IN | SYSTOLIC BLOOD PRESSURE: 103 MMHG | HEART RATE: 90 BPM | TEMPERATURE: 98.1 F | DIASTOLIC BLOOD PRESSURE: 66 MMHG | WEIGHT: 208 LBS

## 2019-09-11 DIAGNOSIS — Z01.818 PREOP GENERAL PHYSICAL EXAM: Primary | ICD-10-CM

## 2019-09-11 DIAGNOSIS — N20.0 BILATERAL KIDNEY STONES: ICD-10-CM

## 2019-09-11 DIAGNOSIS — E11.9 TYPE 2 DIABETES MELLITUS WITHOUT COMPLICATION, WITHOUT LONG-TERM CURRENT USE OF INSULIN (H): ICD-10-CM

## 2019-09-11 DIAGNOSIS — E78.5 HYPERLIPIDEMIA LDL GOAL <70: ICD-10-CM

## 2019-09-11 DIAGNOSIS — I10 BENIGN ESSENTIAL HYPERTENSION: ICD-10-CM

## 2019-09-11 DIAGNOSIS — I25.10 CORONARY ARTERY DISEASE WITHOUT ANGINA PECTORIS, UNSPECIFIED VESSEL OR LESION TYPE, UNSPECIFIED WHETHER NATIVE OR TRANSPLANTED HEART: ICD-10-CM

## 2019-09-11 DIAGNOSIS — E66.01 MORBID OBESITY DUE TO EXCESS CALORIES (H): ICD-10-CM

## 2019-09-11 LAB — HBA1C MFR BLD: 6.9 % (ref 0–5.6)

## 2019-09-11 PROCEDURE — 93000 ELECTROCARDIOGRAM COMPLETE: CPT | Performed by: FAMILY MEDICINE

## 2019-09-11 PROCEDURE — 36415 COLL VENOUS BLD VENIPUNCTURE: CPT | Performed by: FAMILY MEDICINE

## 2019-09-11 PROCEDURE — 82947 ASSAY GLUCOSE BLOOD QUANT: CPT | Performed by: FAMILY MEDICINE

## 2019-09-11 PROCEDURE — 99214 OFFICE O/P EST MOD 30 MIN: CPT | Performed by: FAMILY MEDICINE

## 2019-09-11 PROCEDURE — 83036 HEMOGLOBIN GLYCOSYLATED A1C: CPT | Performed by: FAMILY MEDICINE

## 2019-09-11 ASSESSMENT — MIFFLIN-ST. JEOR: SCORE: 1416.57

## 2019-09-11 NOTE — PROGRESS NOTES
[facility]  [address] [id number] [phone]  Dept: [phone]    PRE-OP EVALUATION:  Today's date: 2019    Debi Mo (: 1945) presents for pre-operative evaluation assessment as requested by Dr. Orona.  She requires evaluation and anesthesia risk assessment prior to undergoing surgery/procedure for treatment of kidney stone .    Fax number for surgical facility:   Primary Physician: Niesha Dietrich  Type of Anesthesia Anticipated: General    Patient has a Health Care Directive or Living Will:  YES on file    Preop Questions 2019   Who is doing your surgery? Dr. Lester Orona   What are you having done? Kidney stone removal with placement of stents   Date of Surgery/Procedure: 2019   Facility or Hospital where procedure/surgery will be performed: [facility]   1.  Do you have a history of Heart attack, stroke, stent, coronary bypass surgery, or other heart surgery? UNKNOWN -    2.  Do you ever have any pain or discomfort in your chest? YES    3.  Do you have a history of  Heart Failure? No   4.   Are you troubled by shortness of breath when:  walking on a level surface, or up a slight hill, or at night? YES    5.  Do you currently have a cold, bronchitis or other respiratory infection? No   6.  Do you have a cough, shortness of breath, or wheezing? YES - cough. Allergies? Lisinopril?   7.  Do you sometimes get pains in the calves of your legs when you walk? No   8. Do you or anyone in your family have previous history of blood clots? YES - sister   9.  Do you or does anyone in your family have a serious bleeding problem such as prolonged bleeding following surgeries or cuts? No   10. Have you ever had problems with anemia or been told to take iron pills? YES -     11. Have you had any abnormal blood loss such as black, tarry or bloody stools, or abnormal vaginal bleeding? YES - past vaginal bleeding prior to hysterectomy    12. Have you ever had a blood transfusion? No   13. Have you or any of your relatives ever had problems with anesthesia? YES - self. Vomiting    14. Do you have sleep apnea, excessive snoring or daytime drowsiness? UNKNOWN -    15. Do you have any prosthetic heart valves? No   16. Do you have prosthetic joints? No   17. Is there any chance that you may be pregnant? No         HPI:     HPI related to upcoming procedure: History of bilateral kidney stones, refractory to treatment with lithotripsy and shockwave. Follows with urology.     DIABETES - Patient has a longstanding history of Diabetes Type II . Patient is being treated with oral agents and denies significant side effects. Control has been good. Complicating factors include but are not limited to: hypertension, hyperlipidemia and morbid obesity .         MEDICAL HISTORY:     Patient Active Problem List    Diagnosis Date Noted     Benign essential hypertension 04/24/2018     Priority: High     Hyperlipidemia LDL goal <70 03/14/2013     Priority: High     Morbid obesity due to excess calories (H) 07/13/2017     Priority: Medium     Type 2 diabetes mellitus with diabetic polyneuropathy, without long-term current use of insulin (H) 07/13/2017     Priority: Medium     Coronary artery disease involving native coronary artery without angina pectoris 10/18/2015     Priority: Medium     1/2012- cath with diffuse disease, no intervention, anterior wall motion abnormality       History of nephrolithiasis 03/14/2013     Priority: Medium     Vitamin B12 deficiency without anemia 11/17/2011     Priority: Medium     Diagnosis updated by automated process. Provider to review and confirm.       Vitamin D deficiency 06/18/2013     Priority: Low     Problem list name updated by automated process. Provider to review and confirm  [employer] Update utility       LBBB (left bundle branch block) 04/03/2013     Priority: Low     Health Care Home 04/01/2013     Priority: Low     Penny  Reba RN-BSN, [facility]  [phone].  FPA / FMG are for Seniors      DX V65.8 REPLACED WITH [facility]ALTH CARE HOME (04/08/2013)       Serum calcium elevated 03/14/2013     Priority: Low     Peripheral neuropathy 03/14/2013     Priority: Low     High triglycerides 01/03/2012     Priority: Low     Advance Care Planning 10/17/2011     Priority: Low     Advance Care Planning 12/6/2016: Receipt of ACP document:  Received: Health Care Directive which was witnessed or notarized on 7/28/03.  Document previously scanned on 10/2/16.  Validation form previously completed and sent to be scanned.  Code Status reflects choices in most recent ACP document.  Confirmed/documented designated decision maker(s).  Added by Nataly Riggins RN, Advance Care Planning Liaison.  Advance Care Planning 5/5/15: Advance Care Planning invitation sent. VERONA Palmer RN  Advance Care Planning 10/17/2011: Patient states has Advance Directive and will bring in a copy to clinic.          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 possible silent MI or may be developing  cardiomyopathy     Hyperlipidemia LDL goal <100 2/11/2011     Hypertension      Kidney stone 2/11/2011    3 episodes (1993,2012 and presently)     Microalbuminuria 10/15/2014     Mumps      Numbness and tingling     diab neuropathy feet     Palpitations     upon exertion     PONV (postoperative nausea and vomiting)     1993     Reflux      Renal cyst, left 2/11/2011     Snores      Tuberculosis     as a child - negative now     Type 2 diabetes, HbA1c goal < 7% (H) 2/11/2011     Past Surgical  History:   Procedure Laterality Date     CARDIAC SURGERY      ANGIOGRAM     CHOLECYSTECTOMY       COLONOSCOPY       COMBINED CYSTOSCOPY, RETROGRADES, URETEROSCOPY, LASER HOLMIUM LITHOTRIPSY URETER(S), INSERT STENT Bilateral 9/28/2016    Procedure: COMBINED CYSTOSCOPY, RETROGRADES, URETEROSCOPY, LASER HOLMIUM LITHOTRIPSY URETER(S), INSERT STENT;  Surgeon: Lester Orona MD;  Location: [facility] OR     CYSTOSCOPY       CYSTOSCOPY, RETROGRADES, COMBINED  4/17/2013    Procedure: COMBINED CYSTOSCOPY, RETROGRADES;;  Surgeon: Lester Orona MD;  Location: [facility] OR     CYSTOSCOPY, URETEROSCOPY, COMBINED  11/12/2013    Procedure: COMBINED CYSTOSCOPY, URETEROSCOPY;  CYSTOSCOPY, LEFT URETEROSCOPY, LEFT EXTRACORPOREAL SHOCKWAVE LITHOTRIPSY  ;  Surgeon: Lester Orona MD;  Location: [facility] OR     EXTRACORPOREAL SHOCK WAVE LITHOTRIPSY (ESWL)  11/12/2013    Procedure: EXTRACORPOREAL SHOCK WAVE LITHOTRIPSY (ESWL);;  Surgeon: Lester Orona MD;  Location: [facility] OR     EXTRACORPOREAL SHOCK WAVE LITHOTRIPSY (ESWL) Bilateral 4/28/2015    Procedure: EXTRACORPOREAL SHOCK WAVE LITHOTRIPSY (ESWL);  Surgeon: Lester Orona MD;  Location: [facility] OR     EXTRACORPOREAL SHOCK WAVE LITHOTRIPSY, CYSTOSCOPY, INSERT STENT URETER(S), COMBINED  1/10/2012    Procedure:COMBINED EXTRACORPOREAL SHOCK WAVE LITHOTRIPSY, CYSTOSCOPY, INSERT STENT URETER(S); LEFT EXTRACORPOREAL SHOCKWAVE LITHOTRIPSY, LEFT STENT; Surgeon:LESTER ORONA; Location:[facility] OR     GALLBLADDER SURGERY  2009     HYSTERECTOMY VAGINAL  1993    left  the ovaries     LASER HOLMIUM LITHOTRIPSY URETER(S), INSERT STENT, COMBINED  4/17/2013    Procedure: COMBINED CYSTOSCOPY, URETEROSCOPY, LASER HOLMIUM LITHOTRIPSY URETER(S), INSERT STENT;  CYSTOSCOPY, Right URETEROSCOPY, LASER HOLMIUM LITHOTRIPSY URETER(S) standby only,Stone basketing, Right Retrogrades, ;  Surgeon: Lester Orona MD;  Location:  OR     SHOULDER SURGERY  2009    left rotator cuff      Current Outpatient Medications   Medication Sig Dispense Refill     blood glucose monitoring (ONE TOUCH DELICA) lancets Use to test blood sugars 1 times daily or as directed. 100 each 11     blood glucose monitoring (ONE TOUCH VERIO IQ) test strip Use to test blood sugars 1 times daily or as directed. 50 each 11     blood glucose monitoring (ONETOUCH VERIO) meter device kit Use to test blood sugars 1 times daily or as directed. 1 kit 0     Cholecalciferol (VITAMIN D3 PO) Take 4,000 Units by mouth daily.       Coenzyme Q10 (CO Q 10 PO) Take 100 mg by mouth daily        cyanocobalamin (VITAMIN B-12) 1000 MCG tablet Take 1 tablet (1,000 mcg) by mouth daily 100 tablet 3     lisinopril (PRINIVIL/ZESTRIL) 20 MG tablet Take 1 tablet (20 mg) by mouth daily 90 tablet 3     loratadine (CLARITIN) 10 MG tablet Take 1 tablet (10 mg) by mouth daily 90 tablet 3     metFORMIN (GLUCOPHAGE) 500 MG tablet Take 2 tablets (1,000 mg) by mouth 2 times daily (with meals) 360 tablet 1     simvastatin (ZOCOR) 20 MG tablet Take 1 tablet (20 mg) by mouth At Bedtime 90 tablet 3     aspirin 81 MG tablet Take 81 mg by mouth daily       oxyCODONE (ROXICODONE) 5 MG tablet Take 1-2 tablets (5-10 mg) by mouth every 6 hours as needed for pain (Patient not taking: Reported on 9/11/2019) 12 tablet 0     OTC products: None, except as noted above    Allergies   Allergen Reactions     Dust Mites      Seasonal Allergies       Latex Allergy: NO    Social History     Tobacco Use     Smoking status: Never Smoker     Smokeless tobacco: Never Used   Substance Use Topics     Alcohol use: No     History   Drug Use No       REVIEW OF SYSTEMS:   Constitutional, neuro, ENT, endocrine, pulmonary, cardiac, gastrointestinal, genitourinary, musculoskeletal, integument and psychiatric systems are negative, except as otherwise noted.    EXAM:   /66 (BP Location: Right arm, Patient Position: Standing, Cuff Size: Adult Large)   Pulse 90   Temp 98.1  F (36.7  C)  "(Oral)   Resp 16   Ht 1.607 m (5' 3.25\")   Wt 94.3 kg (208 lb)   Breastfeeding? No   BMI 36.55 kg/m      GENERAL APPEARANCE: healthy, alert and no distress     EYES: EOMI, PERRL     HENT:  nose and mouth without ulcers or lesions     NECK: no adenopathy, no asymmetry, masses, or scars and thyroid normal to palpation     RESP: lungs clear to auscultation - no rales, rhonchi or wheezes     CV: regular rates and rhythm, normal S1 S2, no S3 or S4 and no murmur, click or rub     ABDOMEN:  soft, nontender, no HSM or masses and bowel sounds normal     MS: extremities normal- no gross deformities noted, no evidence of inflammation in joints, FROM in all extremities.     SKIN: no suspicious lesions or rashes     NEURO: Normal strength and tone, sensory exam grossly normal, mentation intact and speech normal     PSYCH: mentation appears normal. and affect normal/bright     LYMPHATICS: No cervical adenopathy    DIAGNOSTICS:   EKG: appears normal, NSR, normal axis, normal intervals, no acute ST/T changes c/w ischemia, no LVH by voltage criteria, Left Bundle Branch Block, unchanged from previous tracings compared to EKG from 4-3-2013.    Recent Labs   Lab Test 06/08/19  0546 04/19/19  0953 10/23/18  0937  02/19/16  0805  01/19/12  0800   HGB 12.5  --   --   --  13.5   < > 13.3     --   --   --  237   < > 232   INR  --   --   --   --   --   --  0.98    137  --    < > 136   < > 140   POTASSIUM 4.2 4.7  --    < > 4.5   < > 4.4   CR 0.92 0.98  --    < > 1.27*   < > 0.96   A1C  --  7.0* 6.9*   < >  --    < >  --     < > = values in this interval not displayed.        IMPRESSION:   Reason for surgery/procedure: Bilateral kidney stones/cystoscopy with bilateral ureteroscopy, lithotripsy and bilateral stent placement  Diagnosis/reason for consult: Preoperative evaluation    The proposed surgical procedure is considered INTERMEDIATE risk.    REVISED CARDIAC RISK INDEX  The patient has the following serious " cardiovascular risks for perioperative complications such as (MI, PE, VFib and 3  AV Block):  Coronary artery disease    INTERPRETATION: 1 risks: Class II (low risk - 0.9% complication rate)    The patient has the following additional risks for perioperative complications:  Morbid obesity      ICD-10-CM    1. Preop general physical exam Z01.818 EKG 12-lead complete w/read - Clinics     Random Glucose     Hemoglobin A1c     Hemoglobin A1c     Random Glucose   2. Bilateral kidney stones N20.0    3. Coronary artery disease without angina pectoris, unspecified vessel or lesion type, unspecified whether native or transplanted heart I25.10    4. Type 2 diabetes mellitus without complication, without long-term current use of insulin (H) E11.9 Random Glucose     Hemoglobin A1c     Hemoglobin A1c     Random Glucose   5. Benign essential hypertension I10    6. Morbid obesity due to excess calories (H) E66.01    7. Hyperlipidemia LDL goal <70 E78.5        RECOMMENDATIONS:     --Patient is to take all scheduled medications on the day of surgery EXCEPT for modifications listed below.    Anticoagulant or Antiplatelet Medication Use  ASPIRIN: Discontinue ASA 7-10 days prior to procedure to reduce bleeding risk.  It should be resumed post-operatively.      Pending review of diagnostic evaluation, APPROVAL GIVEN to proceed with proposed procedure, without further diagnostic evaluation.       Signed Electronically by: Job Wells MD    Copy of this evaluation report is provided to requesting physician.    [place] Preop Guidelines    Revised Cardiac Risk Index

## 2019-09-12 LAB — GLUCOSE SERPL-MCNC: 102 MG/DL (ref 70–99)

## 2019-09-13 ENCOUNTER — ANESTHESIA EVENT (OUTPATIENT)
Dept: SURGERY | Facility: CLINIC | Age: 74
End: 2019-09-13
Payer: COMMERCIAL

## 2019-09-13 ENCOUNTER — HOSPITAL ENCOUNTER (OUTPATIENT)
Facility: CLINIC | Age: 74
Discharge: HOME | End: 2019-09-13
Attending: UROLOGY | Admitting: UROLOGY
Payer: COMMERCIAL

## 2019-09-13 ENCOUNTER — ANESTHESIA (OUTPATIENT)
Dept: SURGERY | Facility: CLINIC | Age: 74
End: 2019-09-13
Payer: COMMERCIAL

## 2019-09-13 ENCOUNTER — APPOINTMENT (OUTPATIENT)
Dept: GENERAL RADIOLOGY | Facility: CLINIC | Age: 74
End: 2019-09-13
Attending: UROLOGY
Payer: COMMERCIAL

## 2019-09-13 VITALS
SYSTOLIC BLOOD PRESSURE: 139 MMHG | TEMPERATURE: 98.1 F | OXYGEN SATURATION: 95 % | DIASTOLIC BLOOD PRESSURE: 81 MMHG | HEART RATE: 91 BPM | RESPIRATION RATE: 18 BRPM

## 2019-09-13 DIAGNOSIS — N20.0 KIDNEY STONE: Primary | ICD-10-CM

## 2019-09-13 LAB
COPATH REPORT: NORMAL
GLUCOSE BLDC GLUCOMTR-MCNC: 147 MG/DL (ref 70–99)
GLUCOSE BLDC GLUCOMTR-MCNC: 177 MG/DL (ref 70–99)

## 2019-09-13 PROCEDURE — 25500064 ZZH RX 255 OP 636: Performed by: UROLOGY

## 2019-09-13 PROCEDURE — 71000012 ZZH RECOVERY PHASE 1 LEVEL 1 FIRST HR: Performed by: UROLOGY

## 2019-09-13 PROCEDURE — 37000008 ZZH ANESTHESIA TECHNICAL FEE, 1ST 30 MIN: Performed by: UROLOGY

## 2019-09-13 PROCEDURE — 52356 CYSTO/URETERO W/LITHOTRIPSY: CPT | Mod: 22 | Performed by: UROLOGY

## 2019-09-13 PROCEDURE — 74420 UROGRAPHY RTRGR +-KUB: CPT | Mod: 26 | Performed by: UROLOGY

## 2019-09-13 PROCEDURE — 71000027 ZZH RECOVERY PHASE 2 EACH 15 MINS: Performed by: UROLOGY

## 2019-09-13 PROCEDURE — 25800025 ZZH RX 258: Performed by: UROLOGY

## 2019-09-13 PROCEDURE — 25000125 ZZHC RX 250: Performed by: ANESTHESIOLOGY

## 2019-09-13 PROCEDURE — 25000125 ZZHC RX 250: Performed by: UROLOGY

## 2019-09-13 PROCEDURE — 82365 CALCULUS SPECTROSCOPY: CPT | Performed by: UROLOGY

## 2019-09-13 PROCEDURE — 36000060 ZZH SURGERY LEVEL 3 W FLUORO 1ST 30 MIN: Performed by: UROLOGY

## 2019-09-13 PROCEDURE — C1769 GUIDE WIRE: HCPCS | Performed by: UROLOGY

## 2019-09-13 PROCEDURE — 40000306 ZZH STATISTIC PRE PROC ASSESS II: Performed by: UROLOGY

## 2019-09-13 PROCEDURE — 37000009 ZZH ANESTHESIA TECHNICAL FEE, EACH ADDTL 15 MIN: Performed by: UROLOGY

## 2019-09-13 PROCEDURE — 36000058 ZZH SURGERY LEVEL 3 EA 15 ADDTL MIN: Performed by: UROLOGY

## 2019-09-13 PROCEDURE — 25000125 ZZHC RX 250: Performed by: NURSE ANESTHETIST, CERTIFIED REGISTERED

## 2019-09-13 PROCEDURE — 88300 SURGICAL PATH GROSS: CPT | Performed by: UROLOGY

## 2019-09-13 PROCEDURE — C2617 STENT, NON-COR, TEM W/O DEL: HCPCS | Performed by: UROLOGY

## 2019-09-13 PROCEDURE — 25000132 ZZH RX MED GY IP 250 OP 250 PS 637: Performed by: ANESTHESIOLOGY

## 2019-09-13 PROCEDURE — 40000277 XR SURGERY CARM FLUORO LESS THAN 5 MIN W STILLS: Mod: TC

## 2019-09-13 PROCEDURE — 25000128 H RX IP 250 OP 636: Performed by: NURSE ANESTHETIST, CERTIFIED REGISTERED

## 2019-09-13 PROCEDURE — 82962 GLUCOSE BLOOD TEST: CPT

## 2019-09-13 PROCEDURE — 27210794 ZZH OR GENERAL SUPPLY STERILE: Performed by: UROLOGY

## 2019-09-13 PROCEDURE — 25000128 H RX IP 250 OP 636: Performed by: UROLOGY

## 2019-09-13 PROCEDURE — 25800030 ZZH RX IP 258 OP 636: Performed by: ANESTHESIOLOGY

## 2019-09-13 PROCEDURE — 88300 SURGICAL PATH GROSS: CPT | Mod: 26 | Performed by: UROLOGY

## 2019-09-13 DEVICE — STENT URETERAL POLARIS ULTRA 5FRX24CM M0061921220: Type: IMPLANTABLE DEVICE | Site: URETER | Status: FUNCTIONAL

## 2019-09-13 RX ORDER — SODIUM CHLORIDE, SODIUM LACTATE, POTASSIUM CHLORIDE, CALCIUM CHLORIDE 600; 310; 30; 20 MG/100ML; MG/100ML; MG/100ML; MG/100ML
INJECTION, SOLUTION INTRAVENOUS CONTINUOUS
Status: DISCONTINUED | OUTPATIENT
Start: 2019-09-13 | End: 2019-09-13 | Stop reason: HOSPADM

## 2019-09-13 RX ORDER — ONDANSETRON 4 MG/1
4 TABLET, ORALLY DISINTEGRATING ORAL EVERY 30 MIN PRN
Status: DISCONTINUED | OUTPATIENT
Start: 2019-09-13 | End: 2019-09-13 | Stop reason: HOSPADM

## 2019-09-13 RX ORDER — DEXAMETHASONE SODIUM PHOSPHATE 4 MG/ML
INJECTION, SOLUTION INTRA-ARTICULAR; INTRALESIONAL; INTRAMUSCULAR; INTRAVENOUS; SOFT TISSUE PRN
Status: DISCONTINUED | OUTPATIENT
Start: 2019-09-13 | End: 2019-09-13

## 2019-09-13 RX ORDER — ATROPA BELLADONNA AND OPIUM 16.2; 3 MG/1; MG/1
SUPPOSITORY RECTAL PRN
Status: DISCONTINUED | OUTPATIENT
Start: 2019-09-13 | End: 2019-09-13 | Stop reason: HOSPADM

## 2019-09-13 RX ORDER — ONDANSETRON 2 MG/ML
INJECTION INTRAMUSCULAR; INTRAVENOUS PRN
Status: DISCONTINUED | OUTPATIENT
Start: 2019-09-13 | End: 2019-09-13

## 2019-09-13 RX ORDER — PROPOFOL 10 MG/ML
INJECTION, EMULSION INTRAVENOUS PRN
Status: DISCONTINUED | OUTPATIENT
Start: 2019-09-13 | End: 2019-09-13

## 2019-09-13 RX ORDER — LIDOCAINE 40 MG/G
CREAM TOPICAL
Status: DISCONTINUED | OUTPATIENT
Start: 2019-09-13 | End: 2019-09-13 | Stop reason: HOSPADM

## 2019-09-13 RX ORDER — CEFAZOLIN SODIUM 2 G/100ML
2 INJECTION, SOLUTION INTRAVENOUS
Status: COMPLETED | OUTPATIENT
Start: 2019-09-13 | End: 2019-09-13

## 2019-09-13 RX ORDER — ONDANSETRON 2 MG/ML
4 INJECTION INTRAMUSCULAR; INTRAVENOUS EVERY 30 MIN PRN
Status: DISCONTINUED | OUTPATIENT
Start: 2019-09-13 | End: 2019-09-13 | Stop reason: HOSPADM

## 2019-09-13 RX ORDER — NALOXONE HYDROCHLORIDE 0.4 MG/ML
.1-.4 INJECTION, SOLUTION INTRAMUSCULAR; INTRAVENOUS; SUBCUTANEOUS
Status: DISCONTINUED | OUTPATIENT
Start: 2019-09-13 | End: 2019-09-13 | Stop reason: HOSPADM

## 2019-09-13 RX ORDER — FENTANYL CITRATE 50 UG/ML
INJECTION, SOLUTION INTRAMUSCULAR; INTRAVENOUS PRN
Status: DISCONTINUED | OUTPATIENT
Start: 2019-09-13 | End: 2019-09-13

## 2019-09-13 RX ORDER — MEPERIDINE HYDROCHLORIDE 50 MG/ML
12.5 INJECTION INTRAMUSCULAR; INTRAVENOUS; SUBCUTANEOUS
Status: DISCONTINUED | OUTPATIENT
Start: 2019-09-13 | End: 2019-09-13 | Stop reason: HOSPADM

## 2019-09-13 RX ORDER — FENTANYL CITRATE 50 UG/ML
25-50 INJECTION, SOLUTION INTRAMUSCULAR; INTRAVENOUS EVERY 5 MIN PRN
Status: DISCONTINUED | OUTPATIENT
Start: 2019-09-13 | End: 2019-09-13 | Stop reason: HOSPADM

## 2019-09-13 RX ORDER — EPHEDRINE SULFATE 50 MG/ML
INJECTION, SOLUTION INTRAVENOUS PRN
Status: DISCONTINUED | OUTPATIENT
Start: 2019-09-13 | End: 2019-09-13

## 2019-09-13 RX ORDER — HYDROMORPHONE HYDROCHLORIDE 1 MG/ML
.3-.5 INJECTION, SOLUTION INTRAMUSCULAR; INTRAVENOUS; SUBCUTANEOUS EVERY 10 MIN PRN
Status: DISCONTINUED | OUTPATIENT
Start: 2019-09-13 | End: 2019-09-13 | Stop reason: HOSPADM

## 2019-09-13 RX ORDER — SCOLOPAMINE TRANSDERMAL SYSTEM 1 MG/1
1 PATCH, EXTENDED RELEASE TRANSDERMAL ONCE
Status: COMPLETED | OUTPATIENT
Start: 2019-09-13 | End: 2019-09-13

## 2019-09-13 RX ORDER — CEFAZOLIN SODIUM 1 G/3ML
1 INJECTION, POWDER, FOR SOLUTION INTRAMUSCULAR; INTRAVENOUS SEE ADMIN INSTRUCTIONS
Status: DISCONTINUED | OUTPATIENT
Start: 2019-09-13 | End: 2019-09-13 | Stop reason: HOSPADM

## 2019-09-13 RX ORDER — TAMSULOSIN HYDROCHLORIDE 0.4 MG/1
0.4 CAPSULE ORAL DAILY
Qty: 7 CAPSULE | Refills: 0 | Status: SHIPPED | OUTPATIENT
Start: 2019-09-13 | End: 2019-10-25

## 2019-09-13 RX ORDER — PHENYLEPHRINE HYDROCHLORIDE 10 MG/ML
INJECTION INTRAVENOUS PRN
Status: DISCONTINUED | OUTPATIENT
Start: 2019-09-13 | End: 2019-09-13

## 2019-09-13 RX ORDER — ACETAMINOPHEN 325 MG/1
975 TABLET ORAL ONCE
Status: COMPLETED | OUTPATIENT
Start: 2019-09-13 | End: 2019-09-13

## 2019-09-13 RX ORDER — FENTANYL CITRATE 50 UG/ML
25-50 INJECTION, SOLUTION INTRAMUSCULAR; INTRAVENOUS
Status: DISCONTINUED | OUTPATIENT
Start: 2019-09-13 | End: 2019-09-13 | Stop reason: HOSPADM

## 2019-09-13 RX ADMIN — DEXAMETHASONE SODIUM PHOSPHATE 4 MG: 4 INJECTION, SOLUTION INTRA-ARTICULAR; INTRALESIONAL; INTRAMUSCULAR; INTRAVENOUS; SOFT TISSUE at 13:07

## 2019-09-13 RX ADMIN — FENTANYL CITRATE 100 MCG: 50 INJECTION, SOLUTION INTRAMUSCULAR; INTRAVENOUS at 13:07

## 2019-09-13 RX ADMIN — SCOPOLAMINE 1 PATCH: 1 PATCH TRANSDERMAL at 12:49

## 2019-09-13 RX ADMIN — LIDOCAINE HYDROCHLORIDE 50 MG: 10 INJECTION, SOLUTION EPIDURAL; INFILTRATION; INTRACAUDAL; PERINEURAL at 13:07

## 2019-09-13 RX ADMIN — CEFAZOLIN SODIUM 2 G: 2 INJECTION, SOLUTION INTRAVENOUS at 13:11

## 2019-09-13 RX ADMIN — PHENYLEPHRINE HYDROCHLORIDE 50 MCG: 10 INJECTION INTRAVENOUS at 13:34

## 2019-09-13 RX ADMIN — ACETAMINOPHEN 975 MG: 325 TABLET, FILM COATED ORAL at 12:09

## 2019-09-13 RX ADMIN — SODIUM CHLORIDE, POTASSIUM CHLORIDE, SODIUM LACTATE AND CALCIUM CHLORIDE: 600; 310; 30; 20 INJECTION, SOLUTION INTRAVENOUS at 12:42

## 2019-09-13 RX ADMIN — PROPOFOL 120 MG: 10 INJECTION, EMULSION INTRAVENOUS at 13:07

## 2019-09-13 RX ADMIN — EPHEDRINE SULFATE 5 MG: 50 INJECTION, SOLUTION INTRAVENOUS at 13:18

## 2019-09-13 RX ADMIN — MIDAZOLAM 1 MG: 1 INJECTION INTRAMUSCULAR; INTRAVENOUS at 12:54

## 2019-09-13 RX ADMIN — PROPOFOL 50 MG: 10 INJECTION, EMULSION INTRAVENOUS at 14:12

## 2019-09-13 RX ADMIN — ONDANSETRON HYDROCHLORIDE 4 MG: 2 INJECTION, SOLUTION INTRAVENOUS at 13:07

## 2019-09-13 ASSESSMENT — COPD QUESTIONNAIRES
CAT_SEVERITY: MILD
COPD: 1

## 2019-09-13 ASSESSMENT — LIFESTYLE VARIABLES: TOBACCO_USE: 0

## 2019-09-13 ASSESSMENT — ENCOUNTER SYMPTOMS: DYSRHYTHMIAS: 0

## 2019-09-13 NOTE — OP NOTE
Procedure Date: 09/13/2019      SURGEON:  Lester Orona MD.      PREOPERATIVE DIAGNOSIS:  Bilateral kidney stones.      POSTOPERATIVE DIAGNOSIS:  Bilateral kidney stones.      PROCEDURE PERFORMED:  Cystoscopy, bilateral retrograde pyelograms, interpretation of fluoroscopic images, bilateral ureteroscopy with holmium laser lithotripsy and stone basketing, placement of bilateral 5 x 24 double-J ureteral stents; 22 modifier for difficult and lengthy case.      ANESTHESIA:  General.      COMPLICATIONS:  None.      INDICATIONS FOR PROCEDURE:  Berna Mo is a 74-year-old woman with a large lower pole kidney stone who now presents for removal.      DETAILS OF THE PROCEDURE:  The risks and benefits of the procedure were explained in detail to the patient and informed consent was obtained.  The patient was brought to the operating room, placed supine on the operating table where she underwent general endotracheal anesthetic.  She was then moved down to the dorsal lithotomy position where she was prepped and draped in standard sterile fashion.        The procedure began by introducing the 22-Seneg[place]e rigid cystoscope through the urethra into the bladder to perform cystoscopy.  There were no urothelial abnormalities identified.  I cannulated the right orifice through a catheter and performed retrograde pyelogram.  I passed a Glidewire into the right kidney and backloaded off the ureteral catheter along with the scope.  On the retrograde pyelogram, the stone was seen in the lower pole of the right kidney.  Alongside the Glidewire, I passed the rigid ureteroscope through the urethra into the bladder and up the right ureter.  The right ureter was clear.  I passed a second Glidewire into the right kidney and backed off the ureteroscope.  Over this second Glidewire, I passed a 13/15 Sen[place]e x 28 cm ureteral access sheath to the level of the right ureteropelvic junction.  I then passed the Olympus flexible ureteroscope through the  ureteral access sheath.  I performed complete ureteroscopy and there was one large stone in the lower pole of the right kidney.  I used the 200 micron holmium laser fiber to break up the stone into multiple fragments.  The fragments were then basketed out individually and placed in the bladder.  This part of the procedure was very lengthy due to the size of the stone as it broke into likely 50 fragments that all need to be basketed out.  Once I had finish basketing, I performed another retrograde pyelogram through the scope and performed another complete ureteroscopy to make certain all stones were then removed.  I removed the ureteroscope.  I removed the ureteral access sheath.  I passed a 5 x 24 double-J stent over the wire.  The wire was pulled back and a good curl could be seen in the renal pelvis under fluoroscopy.  The scope was reinserted and a good curl seen in the bladder under direct visualization.  I then cannulated the left ureteral orifice with ureteral catheter and retrograde pyelogram.  This retrograde also showed stones in the lower pole of the left kidney.  I passed a Glidewire into the left kidney and backed off the ureteral catheter along with the scope.  Alongside the Glidewire, I passed the rigid ureteroscope through the urethra into the bladder and up the left ureter.  The left ureter was clear of any stones involving the left kidney.  I passed a second Glidewire into the left kidney and backloaded it off the scope.  I then passed a 13/15 [place]bourgish x 28 cm ureteral access sheath over this second Glidewire all the way to the left ureteropelvic junction.  I then passed a flexible ureteroscope through the ureteral access sheath and performed a complete renoscopy on this side.  There were 3 stones in lower pole of the left kidney.  I again used micron holmium laser fiber to break up the stone into multiple fragments and basketed out these fragments.  This part of the case was also quite lengthy, as I  had to break the 3 stones up into 30 or greater fragments and then basketed out each one individually.  Once the fragments had been removed, I performed another retrograde pyelogram through the scope and performed another complete renoscopy to make certain all stones were removed.  I removed the ureteroscope.  I removed the ureteral access sheath.  Over the remaining Glidewire, I passed a 5 x 24 double-J ureteral stent.  The wire was pulled back and a good curl could be seen in the upper pole of the left kidney under fluoroscopy.  I reinserted the scope and a good curl seen in the bladder under direct visualization.  The bladder was drained, the scope was removed.  I placed a B and O suppository and the procedure was concluded.        This was a very difficult and lengthy case due to the size of the patient's stones.  The lasering and basketing parts of the case for both sides took 90 minutes alone.        The patient tolerated the procedure without complications.  She went post-anesthetic care in good condition.  She will go home from there.  I will see her back next week in the office for stent removal.         AILYN MARLOW MD             D: 2019   T: 2019   MT: MOSHE      Name:     JAYANT CORREA   MRN:      [id number]        Account:        [id number]   :      1945           Procedure Date: 2019      Document: [id number]

## 2019-09-13 NOTE — ANESTHESIA CARE TRANSFER NOTE
Patient: Debi Mo    Procedure(s):  Cystoscopy, bilateral ureteroscopy, holmium laser lithotripsy, bilateral ureteral stent placement    Diagnosis: bilateral kidney stone  Diagnosis Additional Information: No value filed.    Anesthesia Type:   General, LMA     Note:  Airway :Face Mask  Patient transferred to:PACU  Comments: [email or web address] Report: Identifed the Patient, Identified the Reponsible Provider, Reviewed the pertinent medical history, Discussed the surgical course, Reviewed Intra-OP anesthesia mangement and issues during anesthesia, Set expectations for post-procedure period and Allowed opportunity for questions and acknowledgement of understanding      Vitals: (Last set prior to Anesthesia Care Transfer)    CRNA VITALS  9/13/2019 1427 - 9/13/2019 1504      9/13/2019             Pulse:  94    SpO2:  95 %                Electronically Signed By: ROD Garduno CRNA  September 13, 2019  3:04 PM

## 2019-09-13 NOTE — ANESTHESIA PREPROCEDURE EVALUATION
Anesthesia Pre-Procedure Evaluation    Patient: Debi Mo   MRN: [id number] : 1945          Preoperative Diagnosis: bilateral kidney stone    Procedure(s):  Cystoscopy, bilateral ureteroscopy, holmium laser lithotripsy, bilateral ureteral stent placement    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 possible silent MI or may be developing  cardiomyopathy     Hyperlipidemia LDL goal <100 2011     Hypertension      Kidney stone 2011    3 episodes (, and presently)     Microalbuminuria 10/15/2014     Mumps      Numbness and tingling     diab neuropathy feet     Palpitations     upon exertion     PONV (postoperative nausea and vomiting)          Reflux      Renal cyst, left 2011     Snores      Tuberculosis     as a child - negative now     Type 2 diabetes, HbA1c goal < 7% (H) 2011     Past Surgical History:   Procedure Laterality Date     CARDIAC SURGERY      ANGIOGRAM     CHOLECYSTECTOMY       COLONOSCOPY       COMBINED CYSTOSCOPY, RETROGRADES, URETEROSCOPY, LASER HOLMIUM LITHOTRIPSY URETER(S), INSERT STENT Bilateral 2016    Procedure: COMBINED CYSTOSCOPY, RETROGRADES, URETEROSCOPY, LASER HOLMIUM LITHOTRIPSY URETER(S), INSERT STENT;  Surgeon: Lester Orona MD;  Location: [facility]     CYSTOSCOPY       CYSTOSCOPY, RETROGRADES, COMBINED  2013    Procedure: COMBINED CYSTOSCOPY, RETROGRADES;;  Surgeon: Lester Orona MD;  [facility]: [facility]     CYSTOSCOPY, URETEROSCOPY, COMBINED  2013    Procedure: COMBINED CYSTOSCOPY, URETEROSCOPY;  CYSTOSCOPY, LEFT URETEROSCOPY, LEFT EXTRACORPOREAL SHOCKWAVE LITHOTRIPSY  ;  Surgeon: Yeyo  Lester Devlin MD;  [facility]: [facility]     EXTRACORPOREAL SHOCK WAVE LITHOTRIPSY (ESWL)  11/12/2013    Procedure: EXTRACORPOREAL SHOCK WAVE LITHOTRIPSY (ESWL);;  Surgeon: Lester Orona MD;  [facility]     EXTRACORPOREAL SHOCK WAVE LITHOTRIPSY (ESWL) Bilateral 4/28/2015    Procedure: EXTRACORPOREAL SHOCK WAVE LITHOTRIPSY (ESWL);  Surgeon: Lester Orona MD;  [facility]     EXTRACORPOREAL SHOCK WAVE LITHOTRIPSY, CYSTOSCOPY, INSERT STENT URETER(S), COMBINED  1/10/2012    Procedure:COMBINED EXTRACORPOREAL SHOCK WAVE LITHOTRIPSY, CYSTOSCOPY, INSERT STENT URETER(S); LEFT EXTRACORPOREAL SHOCKWAVE LITHOTRIPSY, LEFT STENT; Surgeon:LESTER ORONA; Location:[facility]     GALLBLADDER SURGERY  2009     HYSTERECTOMY VAGINAL  1993    left  the ovaries     LASER HOLMIUM LITHOTRIPSY URETER(S), INSERT STENT, COMBINED  4/17/2013    Procedure: COMBINED CYSTOSCOPY, URETEROSCOPY, LASER HOLMIUM LITHOTRIPSY URETER(S), INSERT STENT;  CYSTOSCOPY, Right URETEROSCOPY, LASER HOLMIUM LITHOTRIPSY URETER(S) standby only,Stone basketing, Right Retrogrades, ;  Surgeon: Lestre Orona MD;  Location: [facility]     SHOULDER SURGERY  2009    left rotator cuff     Anesthesia Evaluation     . Pt has had prior anesthetic.     History of anesthetic complications   - PONV        ROS/MED HX    ENT/Pulmonary:     (+)sleep apnea, SELENA risk factors snores loudly, hypertension, obese, mild COPD, doesn't use CPAP , . .   (-) tobacco use and recent URI   Neurologic:     (+)neuropathy - b/l feet,     Cardiovascular:     (+) Dyslipidemia, hypertension--CAD, angina-with excertion, -. : . . GARCIA, . :. . Previous cardiac testing date:results:Stress Testdate:7/20/17 results:  1. Myocardial perfusion imaging using single isotope technique  demonstrated a medium-sized perfusion defect of moderate intensity  involving the mid to basal anterior wall and the basal anteroseptal  wall which is partially reversible and may be consistent with  ischemia  in the LAD distribution.   2. Gated images demonstrated normal left ventricular wall motion. The  left ventricular systolic function is 65% at rest and 77% on the post  stress images. date: results:Cath date: results:CT angio    1.  Mild nonobstructive coronary disease involving the proximal left  mid left anterior descending artery. The other coronary arteries  appear patent without any significant plaque or stenosis.         (-) past MI, taking anticoagulants/antiplatelets, arrhythmias, stent, past MI and CABG   METS/Exercise Tolerance:  4 - Raking leaves, gardening   Hematologic:        (-) anemia ( 12.2)   Musculoskeletal:   (+) arthritis,  -       GI/Hepatic:     (+) GERD Asymptomatic on medication,       Renal/Genitourinary:     (+) Nephrolithiasis ,       Endo:     (+) type II DM Last HgA1c: 6.9 Not using insulin Diabetic complications: neuropathy, Obesity, .      Psychiatric:  - neg psychiatric ROS       Infectious Disease:  - neg infectious disease ROS       Malignancy:      - no malignancy   Other:                          Physical Exam  Normal systems: cardiovascular, pulmonary and dental    Airway   Mallampati: II  TM distance: >3 FB  Neck ROM: full    Dental     Cardiovascular       Pulmonary             Lab Results   Component Value Date    WBC 7.4 06/08/2019    HGB 12.5 06/08/2019    HCT 38.7 06/08/2019     06/08/2019     06/08/2019    POTASSIUM 4.2 06/08/2019    CHLORIDE 109 06/08/2019    CO2 17 (L) 06/08/2019    BUN 14 06/08/2019    CR 0.92 06/08/2019     (H) 09/11/2019    SOCO 9.5 06/08/2019    PHOS 3.1 07/13/2017    ALBUMIN 3.3 (L) 04/19/2019    PROTTOTAL 7.3 04/19/2019    ALT 21 04/19/2019    AST 19 04/19/2019    ALKPHOS 79 04/19/2019    BILITOTAL 0.6 04/19/2019    LIPASE 283 04/23/2015    PTT 30 01/19/2012    INR 0.98 01/19/2012    TSH 1.91 10/23/2018    T4 0.98 10/14/2014       Preop Vitals  BP Readings from Last 3 Encounters:   09/11/19 103/66   06/17/19 132/70  "  06/08/19 146/71    Pulse Readings from Last 3 Encounters:   09/11/19 90   08/19/19 102   06/17/19 80      Resp Readings from Last 3 Encounters:   09/11/19 16   06/08/19 16   01/10/18 14    SpO2 Readings from Last 3 Encounters:   08/19/19 97%   06/08/19 98%   04/24/19 96%      Temp Readings from Last 1 Encounters:   09/11/19 98.1  F (36.7  C) (Oral)    Ht Readings from Last 1 Encounters:   09/11/19 1.607 m (5' 3.25\")      Wt Readings from Last 1 Encounters:   09/11/19 94.3 kg (208 lb)    Estimated body mass index is 36.55 kg/m  as calculated from the following:    Height as of 9/11/19: 1.607 m (5' 3.25\").    Weight as of 9/11/19: 94.3 kg (208 lb).       Anesthesia Plan      History & Physical Review  History and physical reviewed and following examination; no interval change.    ASA Status:  3 .    NPO Status:  > 8 hours    Plan for General and LMA with Intravenous induction. Maintenance will be Inhalation.    PONV prophylaxis:  Dexamethasone or Solumedrol, Ondansetron (or other 5HT-3) and Scopolamine patch       Postoperative Care  Postoperative pain management:  IV analgesics, Oral pain medications and Multi-modal analgesia.      Consents  Anesthetic plan, risks, benefits and alternatives discussed with:  Patient..                 Tr Winn MD                    .  "

## 2019-09-13 NOTE — DISCHARGE INSTRUCTIONS
CYSTOSCOPY DISCHARGE INSTRUCTIONS  [facility]  [address]  [phone]    YOU MAY GO BACK TO YOUR NORMAL DIET AND ACTIVITY, UNLESS YOUR DOCTOR TELLS YOU NOT TO.    FOR THE NEXT TWO DAYS, YOU MAY NOTICE:    SOME BLOOD IN YOUR URINE.  SOME BURNING WHEN YOU URINATE.  AN URGE TO URINATE MORE OFTEN.  BLADDER SPASMS.    THESE ARE NORMAL AFTER THE PROCEDURE.  THEY SHOULD GO AWAY AFTER A DAY OR TWO.  TO RELIEVE THESE PROBLEMS:     DRINK 6 TO 8 LARGE GLASSES OF WATER EACH DAY (INCLUDES DRINKS AT MEALS).  THIS WILL HELP CLEAR THE URINE.    TAKE WARM BATHS TO RELIEVE PAIN AND BLADDER SPASMS.  DO NOT ADD ANYTHING TO THE BATH WATER.    YOUR DOCTOR MAY PRESCRIBE PAIN MEDICINE.  YOU MAY ALSO TAKE TYLENOL (ACETAMINOPHEN) FOR PAIN.    CALL YOUR SURGEON IF YOU HAVE:    A FEVER OVER 101 DEGREES.  CHECK YOUR TEMPERATURE UNDER YOUR TONGUE.    CHILLS.    FAILURE TO URINATE (NO URINE COMES OUT WHEN YOU TRY TO USE THE TOILET).  TRY SOAKING IN A BATHTUB FULL OF WARM WATER.  IF STILL NO URINE, CALL YOUR DOCTOR.    A LOT OF BLOOD IN THE URINE, OR BLOOD CLOTS LARGER THAN A NICKEL.      PAIN IN THE BACK OR BELLY AREA (ABDOMEN).    PAIN OR SPASMS THAT ARE NOT RELIEVED BY WARM TUB BATHS AND PAIN MEDICINE.      SEVERE PAIN, BURNING OR OTHER PROBLEMS WHILE PASSING URINE.    PAIN THAT GETS WORSE AFTER TWO DAYS.         GENERAL ANESTHESIA OR SEDATION ADULT DISCHARGE INSTRUCTIONS   SPECIAL PRECAUTIONS FOR 24 HOURS AFTER SURGERY    IT IS NOT UNUSUAL TO FEEL LIGHT-HEADED OR FAINT, UP TO 24 HOURS AFTER SURGERY OR WHILE TAKING PAIN MEDICATION.  IF YOU HAVE THESE SYMPTOMS; SIT FOR A FEW MINUTES BEFORE STANDING AND HAVE SOMEONE ASSIST YOU WHEN YOU GET UP TO WALK OR USE THE BATHROOM.    YOU SHOULD REST AND RELAX FOR THE NEXT 24 HOURS AND YOU MUST MAKE ARRANGEMENTS TO HAVE SOMEONE STAY WITH YOU FOR AT LEAST 24 HOURS AFTER YOUR DISCHARGE.  AVOID HAZARDOUS AND STRENUOUS ACTIVITIES.  DO NOT MAKE IMPORTANT DECISIONS FOR 24  HOURS.    DO NOT DRIVE ANY VEHICLE OR OPERATE MECHANICAL EQUIPMENT FOR 24 HOURS FOLLOWING THE END OF YOUR SURGERY.  EVEN THOUGH YOU MAY FEEL NORMAL, YOUR REACTIONS MAY BE AFFECTED BY THE MEDICATION YOU HAVE RECEIVED.    DO NOT DRINK ALCOHOLIC BEVERAGES FOR 24 HOURS FOLLOWING YOUR SURGERY.    DRINK CLEAR LIQUIDS (APPLE JUICE, GINGER ALE, 7-UP, BROTH, ETC.).  PROGRESS TO YOUR REGULAR DIET AS YOU FEEL ABLE.    YOU MAY HAVE A DRY MOUTH, A SORE THROAT, MUSCLES ACHES OR TROUBLE SLEEPING.  THESE SHOULD GO AWAY AFTER 24 HOURS.    CALL YOUR DOCTOR FOR ANY OF THE FOLLOWING:  SIGNS OF INFECTION (FEVER, GROWING TENDERNESS AT THE SURGERY SITE, A LARGE AMOUNT OF DRAINAGE OR BLEEDING, SEVERE PAIN, FOUL-SMELLING DRAINAGE, REDNESS OR SWELLING.    IT HAS BEEN OVER 8 TO 10 HOURS SINCE SURGERY AND YOU ARE STILL NOT ABLE TO URINATE (PASS WATER).       Transderm Scop (Scopolamine) Patch    The Transderm Scop patch placed behind your ear helps to prevent nausea and vomiting associated with the use of anesthesia and certain analgesics used during or after many types surgery.  You will need to remove this patch after 3 days.  However, it may be removed sooner if you are no longer concerned about nausea or vomiting.      The most common side effects:  ?  dryness of the mouth  ?  drowsiness  ?  blurred vision  ?  dilation of pupils  ?  disorientation, memory disturbances and/or confusion  ?  dizziness  ?  restlessness  ?  hallucinations  ?  difficulty urinating  ?  skin rash  ?  red or painful eyes    Avoid drinking alcohol while using Transderm Scop patch.  Be careful about driving or operating any machinery while using this medication since it may make you drowsy.  In the unlikely event that you experience pain in the eye, which may be accompanied by widening of the pupil, eye redness and blurred vision, remove the Transderm Scop Patch immediately and consult your doctor.    Removal of Transderm Scop Patch:  To remove the patch simply peel  it off your skin.  Be sure to wash your hands and the area behind your ear thoroughly with soap and water.  The Transderm Scop Patch will continue to have some active ingredient after use.  Therefore, to avoid accidental contact or ingestion by children or pets, fold the used patch in half with the sticky side together and dispose in the trash out of reach of children and pets.  If you wear contact lens, be sure to wash your hands first before handling contact lens.      Removal date: 9/16

## 2019-09-13 NOTE — ANESTHESIA POSTPROCEDURE EVALUATION
Patient: Debi Mo    Procedure(s):  Cystoscopy, bilateral ureteroscopy, holmium laser lithotripsy, bilateral ureteral stent placement    Diagnosis:bilateral kidney stone  Diagnosis Additional Information: No value filed.    Anesthesia Type:  General, LMA    Note:  Anesthesia Post Evaluation    Patient location during evaluation: PACU  Patient participation: Able to fully participate in evaluation  Level of consciousness: awake and alert  Pain management: adequate  Airway patency: patent  Cardiovascular status: acceptable  Respiratory status: acceptable  Hydration status: acceptable  PONV: controlled     Anesthetic complications: None          Last vitals:  Vitals:    09/13/19 1554 09/13/19 1617 09/13/19 1638   BP:  (!) 140/78 139/81   Pulse:      Resp:  16 18   Temp:  97.1  F (36.2  C) 98.1  F (36.7  C)   SpO2: 92% 91% 95%         Electronically Signed By: Giovani Bess MD  September 13, 2019  5:15 PM

## 2019-09-17 LAB
APPEARANCE STONE: NORMAL
COMPN STONE: NORMAL
NUMBER STONE: NORMAL
SIZE STONE: NORMAL MM
WT STONE: 371 MG

## 2019-09-20 ENCOUNTER — OFFICE VISIT (OUTPATIENT)
Dept: UROLOGY | Facility: CLINIC | Age: 74
End: 2019-09-20
Payer: COMMERCIAL

## 2019-09-20 VITALS — BODY MASS INDEX: 35.51 KG/M2 | HEART RATE: 96 BPM | HEIGHT: 64 IN | OXYGEN SATURATION: 97 % | WEIGHT: 208 LBS

## 2019-09-20 DIAGNOSIS — N20.0 KIDNEY STONE: Primary | ICD-10-CM

## 2019-09-20 PROCEDURE — 99213 OFFICE O/P EST LOW 20 MIN: CPT | Mod: 25 | Performed by: UROLOGY

## 2019-09-20 PROCEDURE — 52310 CYSTOSCOPY AND TREATMENT: CPT | Performed by: UROLOGY

## 2019-09-20 RX ORDER — POTASSIUM CITRATE AND CITRIC ACID MONOHYDRATE 1100; 334 MG/5ML; MG/5ML
20 SOLUTION ORAL 2 TIMES DAILY WITH MEALS
Qty: 473 ML | Refills: 3 | Status: SHIPPED | OUTPATIENT
Start: 2019-09-20 | End: 2019-10-25

## 2019-09-20 RX ORDER — POTASSIUM CITRATE AND CITRIC ACID MONOHYDRATE 1100; 334 MG/5ML; MG/5ML
20 SOLUTION ORAL 2 TIMES DAILY WITH MEALS
Status: DISCONTINUED | OUTPATIENT
Start: 2019-09-20 | End: 2019-09-20 | Stop reason: HOSPADM

## 2019-09-20 RX ORDER — CIPROFLOXACIN 500 MG/1
500 TABLET, FILM COATED ORAL ONCE
Qty: 1 TABLET | Refills: 0 | Status: SHIPPED | OUTPATIENT
Start: 2019-09-20 | End: 2019-10-25

## 2019-09-20 ASSESSMENT — PAIN SCALES - GENERAL: PAINLEVEL: NO PAIN (1)

## 2019-09-20 ASSESSMENT — MIFFLIN-ST. JEOR: SCORE: 1428.48

## 2019-09-20 NOTE — PATIENT INSTRUCTIONS

## 2019-09-20 NOTE — LETTER
9/20/2019       RE: Debi Mo  [address] [place] [address]     Dear Colleague,    Thank you for referring your patient, Debi Mo, to the [facility] [place] [facility]. Please see a copy of my visit note below.    Office Visit Note  [facility]  [phone]    Sep 20, 2019    [unfilled]    1945    UROLOGIC DIAGNOSES:    Bilateral ureteral stones    CURRENT INTERVENTIONS:    S/P bilateral ureteroscopy with extraction, potassium citrate    History:    Berna returns to clinic today for stent removal.  She had her stones removed in the operating room last week.  Stents were placed on both sides.  Stones were composed of calcium oxalate mixed with uric acid. She has felt well since her procedure      Imaging:      Labs:      MEDICATIONS:    Current Outpatient Medications:      aspirin 81 MG tablet, Take 81 mg by mouth daily, Disp: , Rfl:      blood glucose monitoring (ONE TOUCH DELICA) lancets, Use to test blood sugars 1 times daily or as directed., Disp: 100 each, Rfl: 11     blood glucose monitoring (ONE TOUCH VERIO IQ) test strip, Use to test blood sugars 1 times daily or as directed., Disp: 50 each, Rfl: 11     blood glucose monitoring (ONETOUCH VERIO) meter device kit, Use to test blood sugars 1 times daily or as directed., Disp: 1 kit, Rfl: 0     Cholecalciferol (VITAMIN D3 PO), Take 4,000 Units by mouth daily., Disp: , Rfl:      Coenzyme Q10 (CO Q 10 PO), Take 100 mg by mouth daily , Disp: , Rfl:      cyanocobalamin (VITAMIN B-12) 1000 MCG tablet, Take 1 tablet (1,000 mcg) by mouth daily, Disp: 100 tablet, Rfl: 3     lisinopril (PRINIVIL/ZESTRIL) 20 MG tablet, Take 1 tablet (20 mg) by mouth daily, Disp: 90 tablet, Rfl: 3     loratadine (CLARITIN) 10 MG tablet, Take 1 tablet (10 mg) by mouth daily, Disp: 90 tablet, Rfl: 3     metFORMIN (GLUCOPHAGE) 500 MG tablet, Take 2 tablets (1,000 mg) by mouth  2 times daily (with meals), Disp: 360 tablet, Rfl: 1     oxyCODONE (ROXICODONE) 5 MG tablet, Take 1-2 tablets (5-10 mg) by mouth every 6 hours as needed for pain (Patient not taking: Reported on 9/11/2019), Disp: 12 tablet, Rfl: 0     simvastatin (ZOCOR) 20 MG tablet, Take 1 tablet (20 mg) by mouth At Bedtime, Disp: 90 tablet, Rfl: 3     tamsulosin (FLOMAX) 0.4 MG capsule, Take 1 capsule (0.4 mg) by mouth daily for 7 days, Disp: 7 capsule, Rfl: 0    ALLERGIES:     Allergies   Allergen Reactions     Dust Mites      Seasonal Allergies        REVIEW OF SYSTEMS: Ten point review of systems without change as outlined in HPI    SURGICAL HISTORY:    Past Surgical History:   Procedure Laterality Date     CARDIAC SURGERY      ANGIOGRAM     CHOLECYSTECTOMY       COLONOSCOPY       COMBINED CYSTOSCOPY, RETROGRADES, URETEROSCOPY, LASER HOLMIUM LITHOTRIPSY URETER(S), INSERT STENT Bilateral 9/28/2016    Procedure: COMBINED CYSTOSCOPY, RETROGRADES, URETEROSCOPY, LASER HOLMIUM LITHOTRIPSY URETER(S), INSERT STENT;  Surgeon: Lester Orona MD;  Location: [facility]     CYSTOSCOPY       CYSTOSCOPY, RETROGRADES, COMBINED  4/17/2013    Procedure: COMBINED CYSTOSCOPY, RETROGRADES;;  Surgeon: Lester Orona MD;  Location: [facility]     CYSTOSCOPY, URETEROSCOPY, COMBINED  11/12/2013    Procedure: COMBINED CYSTOSCOPY, URETEROSCOPY;  CYSTOSCOPY, LEFT URETEROSCOPY, LEFT EXTRACORPOREAL SHOCKWAVE LITHOTRIPSY  ;  Surgeon: Lester Orona MD;  [facility]     EXTRACORPOREAL SHOCK WAVE LITHOTRIPSY (ESWL)  11/12/2013    Procedure: EXTRACORPOREAL SHOCK WAVE LITHOTRIPSY (ESWL);;  Surgeon: Lester Orona MD;  [facility]     EXTRACORPOREAL SHOCK WAVE LITHOTRIPSY (ESWL) Bilateral 4/28/2015    Procedure: EXTRACORPOREAL SHOCK WAVE LITHOTRIPSY (ESWL);  Surgeon: Lester Orona MD;  [facility]     EXTRACORPOREAL SHOCK WAVE LITHOTRIPSY, CYSTOSCOPY, INSERT STENT URETER(S), COMBINED  1/10/2012     Procedure:COMBINED EXTRACORPOREAL SHOCK WAVE LITHOTRIPSY, CYSTOSCOPY, INSERT STENT URETER(S); LEFT EXTRACORPOREAL SHOCKWAVE LITHOTRIPSY, LEFT STENT; Surgeon:LESTER ORONA; Location:[facility]     GALLBLADDER SURGERY  2009     HYSTERECTOMY VAGINAL  1993    left  the ovaries     LASER HOLMIUM LITHOTRIPSY URETER(S), INSERT STENT, COMBINED  4/17/2013    Procedure: COMBINED CYSTOSCOPY, URETEROSCOPY, LASER HOLMIUM LITHOTRIPSY URETER(S), INSERT STENT;  CYSTOSCOPY, Right URETEROSCOPY, LASER HOLMIUM LITHOTRIPSY URETER(S) standby only,Stone basketing, Right Retrogrades, ;  Surgeon: Lester Orona MD;  Location: [facility]     LASER HOLMIUM LITHOTRIPSY URETER(S), INSERT STENT, COMBINED Bilateral 9/13/2019    Procedure: Cystoscopy, bilateral retrograde pyelograms, interpretation of fluoroscopic images, bilateral ureteroscopy with holmium laser lithotripsy and stone basketing, placement of bilateral 5 x 24 double-J ureteral stents; 22 modifier for difficult and lengthy case;  Surgeon: Lester Orona MD;  Location: [facility]     SHOULDER SURGERY  2009    left rotator cuff         PHYSICAL EXAM:    There were no vitals taken for this visit.    Constitutional: Well developed. Conversant and in no acute distress  Eyes: Anicteric sclera, conjunctiva clear, normal extraocular movements  ENT: Normocephalic and atraumatic,   Skin: Warm and dry. No rashes or lesions  Cardiac: No peripheral edema  Back/Flank: Not done  CNS/PNS: Normal musculature and movements, moves all extremities normally  Respiratory: Normal non-labored breathing  Abdomen: Soft nontender and nondistended  Peripheral Vascular: No peripheral edema  Mental Status/Psych: Alert and Oriented x 3. Normal mood and affect      External Genitalia: Not done  Bladder: Not done  Urethra: Not done   Vagina: Not done    Cystoscopy:  I performed flexible cystoscopy and removed both stents without difficulty      Urinalysis:  UA RESULTS:  Recent Labs   Lab Test  08/19/19  1438 06/08/19  0636   COLOR Yellow Light Yellow   APPEARANCE Clear Clear   URINEGLC 100* 1000*   URINEBILI Negative Negative   URINEKETONE Trace* 10*   SG 1.025 1.016   UBLD Negative Moderate*   URINEPH 5.5 5.0   PROTEIN Trace* 10*   UROBILINOGEN 0.2  --    NITRITE Negative Negative   LEUKEST Negative Trace*   RBCU  --  33*   WBCU  --  4         IMPRESSION:    Doing well, stent removed    PLAN:    We discussed prevention methods for future stones. She had stopped taking her potassium citrate about 1 year ago and I recommended that she go back on the medications. Prescription written. I recommended I see her again in 1 year for a CT scan to evaluate for any new stones    Total Time:  15 min  Time in Consultation: 10 min      Lester Orona M.D.

## 2019-09-20 NOTE — PROGRESS NOTES
Office Visit Note  [facility]  [phone]    Sep 20, 2019    [unfilled]    1945    UROLOGIC DIAGNOSES:    Bilateral ureteral stones    CURRENT INTERVENTIONS:    S/P bilateral ureteroscopy with extraction, potassium citrate    History:    Berna returns to clinic today for stent removal.  She had her stones removed in the operating room last week.  Stents were placed on both sides.  Stones were composed of calcium oxalate mixed with uric acid. She has felt well since her procedure      Imaging:      Labs:      MEDICATIONS:    Current Outpatient Medications:      aspirin 81 MG tablet, Take 81 mg by mouth daily, Disp: , Rfl:      blood glucose monitoring (ONE TOUCH DELICA) lancets, Use to test blood sugars 1 times daily or as directed., Disp: 100 each, Rfl: 11     blood glucose monitoring (ONE TOUCH VERIO IQ) test strip, Use to test blood sugars 1 times daily or as directed., Disp: 50 each, Rfl: 11     blood glucose monitoring (ONETOUCH VERIO) meter device kit, Use to test blood sugars 1 times daily or as directed., Disp: 1 kit, Rfl: 0     Cholecalciferol (VITAMIN D3 PO), Take 4,000 Units by mouth daily., Disp: , Rfl:      Coenzyme Q10 (CO Q 10 PO), Take 100 mg by mouth daily , Disp: , Rfl:      cyanocobalamin (VITAMIN B-12) 1000 MCG tablet, Take 1 tablet (1,000 mcg) by mouth daily, Disp: 100 tablet, Rfl: 3     lisinopril (PRINIVIL/ZESTRIL) 20 MG tablet, Take 1 tablet (20 mg) by mouth daily, Disp: 90 tablet, Rfl: 3     loratadine (CLARITIN) 10 MG tablet, Take 1 tablet (10 mg) by mouth daily, Disp: 90 tablet, Rfl: 3     metFORMIN (GLUCOPHAGE) 500 MG tablet, Take 2 tablets (1,000 mg) by mouth 2 times daily (with meals), Disp: 360 tablet, Rfl: 1     oxyCODONE (ROXICODONE) 5 MG tablet, Take 1-2 tablets (5-10 mg) by mouth every 6 hours as needed for pain (Patient not taking: Reported on 9/11/2019), Disp: 12 tablet, Rfl: 0     simvastatin (ZOCOR) 20 MG tablet, Take 1 tablet (20 mg) by mouth At Bedtime,  Disp: 90 tablet, Rfl: 3     tamsulosin (FLOMAX) 0.4 MG capsule, Take 1 capsule (0.4 mg) by mouth daily for 7 days, Disp: 7 capsule, Rfl: 0    ALLERGIES:     Allergies   Allergen Reactions     Dust Mites      Seasonal Allergies        REVIEW OF SYSTEMS: Ten point review of systems without change as outlined in HPI    SURGICAL HISTORY:    Past Surgical History:   Procedure Laterality Date     CARDIAC SURGERY      ANGIOGRAM     CHOLECYSTECTOMY       COLONOSCOPY       COMBINED CYSTOSCOPY, RETROGRADES, URETEROSCOPY, LASER HOLMIUM LITHOTRIPSY URETER(S), INSERT STENT Bilateral 9/28/2016    Procedure: COMBINED CYSTOSCOPY, RETROGRADES, URETEROSCOPY, LASER HOLMIUM LITHOTRIPSY URETER(S), INSERT STENT;  Surgeon: Lester Orona MD;  Location: [facility]     CYSTOSCOPY       CYSTOSCOPY, RETROGRADES, COMBINED  4/17/2013    Procedure: COMBINED CYSTOSCOPY, RETROGRADES;;  Surgeon: Lester Orona MD;  Location: [facility]     CYSTOSCOPY, URETEROSCOPY, COMBINED  11/12/2013    Procedure: COMBINED CYSTOSCOPY, URETEROSCOPY;  CYSTOSCOPY, LEFT URETEROSCOPY, LEFT EXTRACORPOREAL SHOCKWAVE LITHOTRIPSY  ;  Surgeon: Lester Orona MD;  Location:  OR     EXTRACORPOREAL SHOCK WAVE LITHOTRIPSY (ESWL)  11/12/2013    Procedure: EXTRACORPOREAL SHOCK WAVE LITHOTRIPSY (ESWL);;  Surgeon: Lester Orona MD;  [facility]: [facility]     EXTRACORPOREAL SHOCK WAVE LITHOTRIPSY (ESWL) Bilateral 4/28/2015    Procedure: EXTRACORPOREAL SHOCK WAVE LITHOTRIPSY (ESWL);  Surgeon: Lester Ornoa MD;  [facility]: [facility]     EXTRACORPOREAL SHOCK WAVE LITHOTRIPSY, CYSTOSCOPY, INSERT STENT URETER(S), COMBINED  1/10/2012    Procedure:COMBINED EXTRACORPOREAL SHOCK WAVE LITHOTRIPSY, CYSTOSCOPY, INSERT STENT URETER(S); LEFT EXTRACORPOREAL SHOCKWAVE LITHOTRIPSY, LEFT STENT; Surgeon:LESTER ORONA; Location:[facility] OR     GALLBLADDER SURGERY  2009     HYSTERECTOMY VAGINAL  1993    left  the ovaries     LASER HOLMIUM LITHOTRIPSY URETER(S), INSERT  STENT, COMBINED  4/17/2013    Procedure: COMBINED CYSTOSCOPY, URETEROSCOPY, LASER HOLMIUM LITHOTRIPSY URETER(S), INSERT STENT;  CYSTOSCOPY, Right URETEROSCOPY, LASER HOLMIUM LITHOTRIPSY URETER(S) standby only,Stone basketing, Right Retrogrades, ;  Surgeon: Lester Orona MD;  Location: [facility]     LASER HOLMIUM LITHOTRIPSY URETER(S), INSERT STENT, COMBINED Bilateral 9/13/2019    Procedure: Cystoscopy, bilateral retrograde pyelograms, interpretation of fluoroscopic images, bilateral ureteroscopy with holmium laser lithotripsy and stone basketing, placement of bilateral 5 x 24 double-J ureteral stents; 22 modifier for difficult and lengthy case;  Surgeon: Lester Orona MD;  Location: [facility]     SHOULDER SURGERY  2009    left rotator cuff         PHYSICAL EXAM:    There were no vitals taken for this visit.    Constitutional: Well developed. Conversant and in no acute distress  Eyes: Anicteric sclera, conjunctiva clear, normal extraocular movements  ENT: Normocephalic and atraumatic,   Skin: Warm and dry. No rashes or lesions  Cardiac: No peripheral edema  Back/Flank: Not done  CNS/PNS: Normal musculature and movements, moves all extremities normally  Respiratory: Normal non-labored breathing  Abdomen: Soft nontender and nondistended  Peripheral Vascular: No peripheral edema  Mental Status/Psych: Alert and Oriented x 3. Normal mood and affect      External Genitalia: Not done  Bladder: Not done  Urethra: Not done   Vagina: Not done    Cystoscopy:  I performed flexible cystoscopy and removed both stents without difficulty      Urinalysis:  UA RESULTS:  Recent Labs   Lab Test 08/19/19  1438 06/08/19  0636   COLOR Yellow Light Yellow   APPEARANCE Clear Clear   URINEGLC 100* 1000*   URINEBILI Negative Negative   URINEKETONE Trace* 10*   SG 1.025 1.016   UBLD Negative Moderate*   URINEPH 5.5 5.0   PROTEIN Trace* 10*   UROBILINOGEN 0.2  --    NITRITE Negative Negative   LEUKEST Negative Trace*   RBCU  --  33*    WBCU  --  4         IMPRESSION:    Doing well, stent removed    PLAN:    We discussed prevention methods for future stones. She had stopped taking her potassium citrate about 1 year ago and I recommended that she go back on the medications. Prescription written. I recommended I see her again in 1 year for a CT scan to evaluate for any new stones    Total Time:  15 min  Time in Consultation: 10 min      Lester Orona M.D.

## 2019-09-20 NOTE — NURSING NOTE
Prior to the start of the procedure and with procedural staff participation, I verbally confirmed the patient s identity using two indicators, relevant allergies, that the procedure was appropriate and matched the consent or emergent situation, and that the correct equipment/implants were available. Immediately prior to starting the procedure I conducted the Time Out with the procedural staff and re-confirmed the patient s name, procedure, and site/side. (The Joint Commission universal protocol was followed.)  Yes    Sedation (Moderate or Deep): None  Pt has signed the consent form stating that we will be doing a CYSTOSCOPY (with or without stent removal) today, and that it is the correct procedure. I verbally confirmed the patient s identity using two indicators, relevant allergies, and that the correct equipment was available. Post-op information given to the pt as needed at check-out. I have sent an appropriate antibiotic to the pharmacy in our building as recommended by the MD. VALERIE Leslie CMA

## 2019-09-26 ENCOUNTER — TELEPHONE (OUTPATIENT)
Dept: UROLOGY | Facility: CLINIC | Age: 74
End: 2019-09-26

## 2019-09-26 DIAGNOSIS — N20.0 KIDNEY STONE: Primary | ICD-10-CM

## 2019-09-26 NOTE — TELEPHONE ENCOUNTER
Prior Authorization Retail Medication Request    Medication/Dose: potassium citrate-cit [id number] soln  ICD code (if different than what is on RX):    Previously Tried and Failed:    Rationale:      Insurance Name:  [employer] Part D  Insurance ID:  [id number]      Pharmacy Information (if different than what is on RX)  Name:  [facility]  Phone:  [phone]    Product/service not covered, plan/benefit exclusion.    Please try to submit a prior authorization to get this covered.    Thank you,  Damaris Camargo CPhT  [facility]

## 2019-09-30 ENCOUNTER — HEALTH MAINTENANCE LETTER (OUTPATIENT)
Age: 74
End: 2019-09-30

## 2019-09-30 NOTE — TELEPHONE ENCOUNTER
Central Prior Authorization Team   Phone: [phone]    PA Initiation    Medication: potassium citrate-cit [id number] soln  Insurance Company:    Pharmacy Filling the Rx:    Filling Pharmacy Phone:    Filling Pharmacy Fax:    Start Date:

## 2019-10-03 RX ORDER — POTASSIUM CITRATE AND CITRIC ACID MONOHYDRATE 1100; 334 MG/5ML; MG/5ML
20 SOLUTION ORAL 2 TIMES DAILY WITH MEALS
Qty: 1800 ML | Refills: 1 | Status: SHIPPED | OUTPATIENT
Start: 2019-10-03 | End: 2020-01-20

## 2019-10-03 NOTE — TELEPHONE ENCOUNTER
Medication has been denied.  I printed a new prescription and got a coupon off of the computer from [employer].  Everything has been mailed out to her.   Odalis HURST  [facility]  October 3, 2019 11:42 AM

## 2019-10-17 ENCOUNTER — DOCUMENTATION ONLY (OUTPATIENT)
Dept: PEDIATRICS | Facility: CLINIC | Age: 74
End: 2019-10-17

## 2019-10-23 DIAGNOSIS — E11.42 TYPE 2 DIABETES MELLITUS WITH DIABETIC POLYNEUROPATHY, WITHOUT LONG-TERM CURRENT USE OF INSULIN (H): ICD-10-CM

## 2019-10-23 NOTE — TELEPHONE ENCOUNTER
Requested Prescriptions   Pending Prescriptions Disp Refills     metFORMIN (GLUCOPHAGE) 500 MG tablet [Pharmacy Med Name: METFORMIN  MG TABLET]    Last Written Prescription Date:  4/24/2019  Last Fill Quantity: 360,  # refills: 1   Last office visit: 4/24/2019 with prescribing provider:  Niesha Dietrich Office Visit:   Next 5 appointments (look out 90 days)    Oct 25, 2019  9:45 AM CDT  Office Visit with Niesha Dietrich MD, Ea Rn Pal 3a, EA EXAM ROOM 19  [facility] [address] [place] [address]  [address]  [facility] [id number]  [phone]          360 tablet 1     Sig: TAKE 2 TABLETS (1,000 MG) BY MOUTH 2 TIMES DAILY (WITH MEALS)       Biguanide Agents Passed - 10/23/2019  1:58 AM        Passed - Blood pressure less than 140/90 in past 6 months     BP Readings from Last 3 Encounters:   09/13/19 139/81   09/11/19 103/66   06/17/19 132/70                 Passed - Patient has documented LDL within the past 12 mos.     Recent Labs   Lab Test 04/19/19  0953   LDL 31             Passed - Patient has had a Microalbumin in the past 15 mos.     Recent Labs   Lab Test 04/19/19  0953   MICROL 27   UMALCR 18.74             Passed - Patient is age 10 or older        Passed - Patient has documented A1c within the specified period of time.     If HgbA1C is 8 or greater, it needs to be on file within the past 3 months.  If less than 8, must be on file within the past 6 months.     Recent Labs   Lab Test 09/11/19  1529   A1C 6.9*             Passed - Patient's CR is NOT>1.4 OR Patient's EGFR is NOT<45 within past 12 mos.     Recent Labs   Lab Test 06/08/19  0546   GFRESTIMATED 61   GFRESTBLACK 71       Recent Labs   Lab Test 06/08/19  0546   CR 0.92             Passed - Patient does NOT have a diagnosis of CHF.        Passed - Medication is active on med list        Passed - Patient is not pregnant        Passed - Patient has not had a positive pregnancy  "test within the past 12 mos.         Passed - Recent (6 mo) or future (30 days) visit within the authorizing provider's specialty     Patient had office visit in the last 6 months or has a visit in the next 30 days with authorizing provider or within the authorizing provider's specialty.  See \"Patient Info\" tab in inbasket, or \"Choose Columns\" in Meds & Orders section of the refill encounter.              "

## 2019-10-25 ENCOUNTER — OFFICE VISIT (OUTPATIENT)
Dept: PEDIATRICS | Facility: CLINIC | Age: 74
End: 2019-10-25
Payer: COMMERCIAL

## 2019-10-25 VITALS
DIASTOLIC BLOOD PRESSURE: 66 MMHG | TEMPERATURE: 98.6 F | BODY MASS INDEX: 35.87 KG/M2 | RESPIRATION RATE: 20 BRPM | OXYGEN SATURATION: 98 % | SYSTOLIC BLOOD PRESSURE: 122 MMHG | HEIGHT: 64 IN | WEIGHT: 210.1 LBS | HEART RATE: 116 BPM

## 2019-10-25 DIAGNOSIS — I25.10 CORONARY ARTERY DISEASE INVOLVING NATIVE CORONARY ARTERY OF NATIVE HEART WITHOUT ANGINA PECTORIS: ICD-10-CM

## 2019-10-25 DIAGNOSIS — E53.8 VITAMIN B12 DEFICIENCY WITHOUT ANEMIA: ICD-10-CM

## 2019-10-25 DIAGNOSIS — E11.42 TYPE 2 DIABETES MELLITUS WITH DIABETIC POLYNEUROPATHY, WITHOUT LONG-TERM CURRENT USE OF INSULIN (H): Primary | ICD-10-CM

## 2019-10-25 DIAGNOSIS — Z12.11 SPECIAL SCREENING FOR MALIGNANT NEOPLASMS, COLON: ICD-10-CM

## 2019-10-25 DIAGNOSIS — Z87.442 HISTORY OF NEPHROLITHIASIS: ICD-10-CM

## 2019-10-25 DIAGNOSIS — E66.01 MORBID OBESITY DUE TO EXCESS CALORIES (H): ICD-10-CM

## 2019-10-25 DIAGNOSIS — I10 BENIGN ESSENTIAL HYPERTENSION: ICD-10-CM

## 2019-10-25 DIAGNOSIS — J06.9 VIRAL UPPER RESPIRATORY TRACT INFECTION: ICD-10-CM

## 2019-10-25 PROCEDURE — 99214 OFFICE O/P EST MOD 30 MIN: CPT | Performed by: INTERNAL MEDICINE

## 2019-10-25 RX ORDER — LISINOPRIL 20 MG/1
20 TABLET ORAL DAILY
Qty: 90 TABLET | Refills: 3 | Status: SHIPPED | OUTPATIENT
Start: 2019-10-25 | End: 2020-08-13

## 2019-10-25 ASSESSMENT — MIFFLIN-ST. JEOR: SCORE: 1438.01

## 2019-10-25 NOTE — PROGRESS NOTES
Subjective     Debi Mo is a 74 year old female who presents to clinic today for the following health issues:    HPI   Diabetes Follow-up      How often are you checking your blood sugar? Not at all    What time of day are you checking your blood sugars (select all that apply)?  NA    Have you had any blood sugars above 200?  NA    Have you had any blood sugars below 70?  NA    What symptoms do you notice when your blood sugar is low?  None    What concerns do you have today about your diabetes? None     Do you have any of these symptoms? (Select all that apply)  Numbness in feet and Burning in feet     Have you had a diabetic eye exam in the last 12 months? Yes, May 2019       Monday woke with sore throat.  No fevers.  Deep cough, nasal congestion.  Is a little short of breath.  Cough is productive of sputum.  No face pain or headaches.  Slept better last night, and sore throat has resolved.  Has been using cough drops.      Had kidney stones over the summer, had lithotripsy and removed 4 stones.  Switched potassium from pills to liquid for absorption.      Is not checking sugars, does not think she's been low.  a1c 6.9 in September.      No chest pain or shortness of breath.  Not checking bp's      BP Readings from Last 2 Encounters:   09/13/19 139/81   09/11/19 103/66     Hemoglobin A1C (%)   Date Value   09/11/2019 6.9 (H)   04/19/2019 7.0 (H)     LDL Cholesterol Calculated (mg/dL)   Date Value   04/19/2019 31   04/20/2018 42       Diabetes Management Resources      How many servings of fruits and vegetables do you eat daily?  4 or more    On average, how many sweetened beverages do you drink each day (soda, juice, sweet tea, etc)?   0  How many days per week do you miss taking your medication? Sometimes here and there     What makes it hard for you to take your medications?  remembering to take            Patient Active Problem List   Diagnosis     Seasonal allergic rhinitis     Advance Care Planning      Vitamin B12 deficiency without anemia     High triglycerides     History of nephrolithiasis     Serum calcium elevated     Hyperlipidemia LDL goal <70     Peripheral neuropathy     Health Care Home     LBBB (left bundle branch block)     Vitamin D deficiency     Coronary artery disease involving native coronary artery without angina pectoris     Morbid obesity due to excess calories (H)     Type 2 diabetes mellitus with diabetic polyneuropathy, without long-term current use of insulin (H)     Benign essential hypertension     Past Surgical History:   Procedure Laterality Date     CARDIAC SURGERY      ANGIOGRAM     CHOLECYSTECTOMY       COLONOSCOPY       COMBINED CYSTOSCOPY, RETROGRADES, URETEROSCOPY, LASER HOLMIUM LITHOTRIPSY URETER(S), INSERT STENT Bilateral 9/28/2016    Procedure: COMBINED CYSTOSCOPY, RETROGRADES, URETEROSCOPY, LASER HOLMIUM LITHOTRIPSY URETER(S), INSERT STENT;  Surgeon: Lester Orona MD;  Location: [facility]     CYSTOSCOPY       CYSTOSCOPY, RETROGRADES, COMBINED  4/17/2013    Procedure: COMBINED CYSTOSCOPY, RETROGRADES;;  Surgeon: Lester Orona MD;  [facility]: [facility]     CYSTOSCOPY, URETEROSCOPY, COMBINED  11/12/2013    Procedure: COMBINED CYSTOSCOPY, URETEROSCOPY;  CYSTOSCOPY, LEFT URETEROSCOPY, LEFT EXTRACORPOREAL SHOCKWAVE LITHOTRIPSY  ;  Surgeon: Lester Orona MD;  [facility]: [facility]     EXTRACORPOREAL SHOCK WAVE LITHOTRIPSY (ESWL)  11/12/2013    Procedure: EXTRACORPOREAL SHOCK WAVE LITHOTRIPSY (ESWL);;  Surgeon: Lester Orona MD;  [facility]     EXTRACORPOREAL SHOCK WAVE LITHOTRIPSY (ESWL) Bilateral 4/28/2015    Procedure: EXTRACORPOREAL SHOCK WAVE LITHOTRIPSY (ESWL);  Surgeon: Lester Orona MD;  [facility]     EXTRACORPOREAL SHOCK WAVE LITHOTRIPSY, CYSTOSCOPY, INSERT STENT URETER(S), COMBINED  1/10/2012    Procedure:COMBINED EXTRACORPOREAL SHOCK WAVE LITHOTRIPSY, CYSTOSCOPY, INSERT STENT URETER(S); LEFT EXTRACORPOREAL SHOCKWAVE  LITHOTRIPSY, LEFT STENT; Surgeon:LESTER ORONA; Location:[facility]     GALLBLADDER SURGERY  2009     HYSTERECTOMY VAGINAL  1993    left  the ovaries     LASER HOLMIUM LITHOTRIPSY URETER(S), INSERT STENT, COMBINED  4/17/2013    Procedure: COMBINED CYSTOSCOPY, URETEROSCOPY, LASER HOLMIUM LITHOTRIPSY URETER(S), INSERT STENT;  CYSTOSCOPY, Right URETEROSCOPY, LASER HOLMIUM LITHOTRIPSY URETER(S) standby only,Stone basketing, Right Retrogrades, ;  Surgeon: Lester Orona MD;  Location: [facility]     LASER HOLMIUM LITHOTRIPSY URETER(S), INSERT STENT, COMBINED Bilateral 9/13/2019    Procedure: Cystoscopy, bilateral retrograde pyelograms, interpretation of fluoroscopic images, bilateral ureteroscopy with holmium laser lithotripsy and stone basketing, placement of bilateral 5 x 24 double-J ureteral stents; 22 modifier for difficult and lengthy case;  Surgeon: Lester Orona MD;  Location: [facility]     SHOULDER SURGERY  2009    left rotator cuff       Social History     Tobacco Use     Smoking status: Never Smoker     Smokeless tobacco: Never Used   Substance Use Topics     Alcohol use: No     Family History   Problem Relation Age of Onset     Respiratory Mother         CHF     Alzheimer Disease Mother         Dementia     Heart Disease Father         Heart Attack     Respiratory Father         CHF     Breast Cancer Maternal Grandmother      Breast Cancer Paternal Grandmother      Arthritis Brother      Arthritis Sister      Hypertension Son      Diabetes Daughter      Arthritis Sister      Arthritis Sister          Current Outpatient Medications   Medication Sig Dispense Refill     aspirin 81 MG tablet Take 81 mg by mouth daily       blood glucose monitoring (ONE TOUCH DELICA) lancets Use to test blood sugars 1 times daily or as directed. 100 each 11     blood glucose monitoring (ONE TOUCH VERIO IQ) test strip Use to test blood sugars 1 times daily or as directed. 50 each 11     blood glucose monitoring  (ONETOUCH VERIO) meter device kit Use to test blood sugars 1 times daily or as directed. 1 kit 0     Cholecalciferol (VITAMIN D3 PO) Take 4,000 Units by mouth daily.       Coenzyme Q10 (CO Q 10 PO) Take 100 mg by mouth daily        cyanocobalamin (VITAMIN B-12) 1000 MCG tablet Take 1 tablet (1,000 mcg) by mouth daily 100 tablet 3     lisinopril (PRINIVIL/ZESTRIL) 20 MG tablet Take 1 tablet (20 mg) by mouth daily 90 tablet 3     loratadine (CLARITIN) 10 MG tablet Take 1 tablet (10 mg) by mouth daily 90 tablet 3     metFORMIN (GLUCOPHAGE) 500 MG tablet TAKE 2 TABLETS (1,000 MG) BY MOUTH 2 TIMES DAILY (WITH MEALS) 360 tablet 0     potassium citrate-citric acid (CYTRA-K) 1100-334 MG/5ML solution Take 20 mLs (40 mEq) by mouth 2 times daily (with meals) 1800 mL 1     simvastatin (ZOCOR) 20 MG tablet Take 1 tablet (20 mg) by mouth At Bedtime 90 tablet 3     Allergies   Allergen Reactions     Dust Mites      Seasonal Allergies      Recent Labs   Lab Test 09/11/19  1529 06/08/19  0546 04/19/19  0953 10/23/18  1311 10/23/18  0937 04/20/18  1018  04/06/17  0928 10/12/16  1038   A1C 6.9*  --  7.0*  --  6.9* 7.0*   < > 7.8* 7.1*   LDL  --   --  31  --   --  42  --  34  --    HDL  --   --  33*  --   --  28*  --  34*  --    TRIG  --   --  363*  --   --  252*  --  314*  --    ALT  --   --  21  --   --  17  --  45  --    CR  --  0.92 0.98  --   --  0.80  --  0.88 0.83   GFRESTIMATED  --  61 57*  --   --  71   < > 63 68   GFRESTBLACK  --  71 66  --   --  86   < > 77 82   POTASSIUM  --  4.2 4.7  --   --  4.4  --  4.7 4.4   TSH  --   --   --  1.91  --   --   --   --  1.58    < > = values in this interval not displayed.      BP Readings from Last 3 Encounters:   09/13/19 139/81   09/11/19 103/66   06/17/19 132/70    Wt Readings from Last 3 Encounters:   09/20/19 94.3 kg (208 lb)   09/11/19 94.3 kg (208 lb)   08/19/19 97.5 kg (215 lb)                Reviewed and updated as needed this visit by Provider         Review of Systems   ROS  "COMP: Constitutional, HEENT, cardiovascular, pulmonary, gi and gu systems are negative, except as otherwise noted.      Objective    /66 (BP Location: Right arm, Patient Position: Chair, Cuff Size: Adult Large)   Pulse 116   Temp 98.6  F (37  C) (Oral)   Resp 20   Ht 1.626 m (5' 4\")   Wt 95.3 kg (210 lb 1.6 oz)   SpO2 98%   BMI 36.06 kg/m    Body mass index is 36.06 kg/m .  Physical Exam   GENERAL:  alert and no distress.  Sounds congested with harsh cough.    EYES: Eyes grossly normal to inspection, PERRL and conjunctivae and sclerae normal  HENT: ear canals and TM's normal, nose and mouth without ulcers or lesions  NECK: no adenopathy, no asymmetry, masses, or scars and thyroid normal to palpation  RESP: lungs clear to auscultation - no rales, rhonchi or wheezes  BREAST: normal without masses, tenderness or nipple discharge and no palpable axillary masses or adenopathy  CV: regular rate and rhythm, normal S1 S2, no S3 or S4, no murmur, click or rub, no peripheral edema and peripheral pulses strong  ABDOMEN: soft, nontender, and bowel sounds normal  MS: no gross musculoskeletal defects noted, no edema  SKIN: no suspicious lesions or rashes  NEURO: Normal strength and tone, mentation intact and speech normal  PSYCH: mentation appears normal, affect normal/bright  Diabetic foot exam: normal DP and PT pulses, no trophic changes or ulcerative lesions, normal sensory exam and normal monofilament exam.  Right great toenail ingrown on inner aspect of nail with some erythema and pain.     Diagnostic Test Results:  Labs reviewed in Epic        Assessment & Plan     (E11.42) Type 2 diabetes mellitus with diabetic polyneuropathy, without long-term current use of insulin (H)  (primary encounter diagnosis)  -a1c was 6.9 in September, goal considering CAD is under 8  -currently on metformin for blood sugar control; discussed diet and exercise to help improve control   -also on asa, statin and ace  -eye exam UTD, " "foot exam today was normal  -recheck in 6 months   -encouraged patient to schedule podiatry appointment for toenail.    Plan: metFORMIN (GLUCOPHAGE) 500 MG tablet,         Hemoglobin A1c, Lipid panel reflex to direct         LDL Fasting, Comprehensive metabolic panel,         Albumin Random Urine Quantitative with Creat         Ratio        (J06.9) Viral upper respiratory tract infection  -exam benign, appears to be improving  -considering duration and improvement this is likely viral, reviewed red flag symptoms and reasons to seek further care  -if not better in a week or worsening would consider antibiotics     (I10) Benign essential hypertension  -bp at goal on lisinopril  -check renal function at next visit   Plan: lisinopril (PRINIVIL/ZESTRIL) 20 MG tablet,         Comprehensive metabolic panel         (I25.10) Coronary artery disease involving native coronary artery of native heart without angina pectoris  -currently asymptomatic, no history of MI; found on cath in 2012  -goal is risk factor reduction, on asa, statin  Plan: Lipid panel reflex to direct LDL Fasting        (Z87.442) History of nephrolithiasis  -followed by urology  -on potassium to help prevent further stones    (E66.01) Morbid obesity due to excess calories (H)  -discussed importance of diet, exercise and weight loss     (Z12.11) Special screening for malignant neoplasms, colon  Plan: GASTROENTEROLOGY ADULT REF PROCEDURE ONLY None        (E53.8) Vitamin B12 deficiency without anemia  Plan: Vitamin B12         BMI:   Estimated body mass index is 35.7 kg/m  as calculated from the following:    Height as of 9/20/19: 1.626 m (5' 4\").    Weight as of 9/20/19: 94.3 kg (208 lb).   Weight management plan: Discussed healthy diet and exercise guidelines        FUTURE APPOINTMENTS:       - Follow-up visit in 6 months with Dr. Fan, scheduled.      No follow-ups on file.    Niesha Dietrich MD  [facility] JESSICA         "

## 2019-12-16 ENCOUNTER — OFFICE VISIT (OUTPATIENT)
Dept: PODIATRY | Facility: CLINIC | Age: 74
End: 2019-12-16
Payer: COMMERCIAL

## 2019-12-16 VITALS
DIASTOLIC BLOOD PRESSURE: 70 MMHG | HEIGHT: 64 IN | WEIGHT: 210.1 LBS | BODY MASS INDEX: 35.87 KG/M2 | SYSTOLIC BLOOD PRESSURE: 124 MMHG

## 2019-12-16 DIAGNOSIS — L60.0 ONYCHOCRYPTOSIS: Primary | ICD-10-CM

## 2019-12-16 PROCEDURE — 99213 OFFICE O/P EST LOW 20 MIN: CPT | Mod: 25 | Performed by: PODIATRIST

## 2019-12-16 PROCEDURE — 11730 AVULSION NAIL PLATE SIMPLE 1: CPT | Mod: T5 | Performed by: PODIATRIST

## 2019-12-16 ASSESSMENT — MIFFLIN-ST. JEOR: SCORE: 1438.01

## 2019-12-16 NOTE — PROGRESS NOTES
"Foot & Ankle Surgery   December 16, 2019    S:  Pt is seen today for evaluation of painful ingrown nail bilateral border R hallux.  She thinks she cut the lateral border incorrectly, and it peeled back along the entire side.   The medial border is sore distally.  I saw her for an ingrown L hallux in April.    Vitals:    12/16/19 1322   BP: 124/70   Weight: 95.3 kg (210 lb 1.6 oz)   Height: 1.626 m (5' 4\")   '      ROS - Pos for CC.  Patient denies current nausea, vomiting, chills, fevers, belly pain, calf pain, chest pain or SOB.  Complete remainder of ROS it otherwise neg.      PE:  Gen:   No apparent distress  Eye:    Visual scanning without deficit  Ear:    Response to auditory stimuli wnl  Lung:    Non-labored breathing on RA noted  Abd:    NTND per patient report  Lymph:    Neg for pitting/non-pitting edema BLE  Vasc:    Pulses palpable, CFT minimally delayed  Neuro:    Light touch sensation intact to all sensory nerve distributions without paresthesias  Derm:    R great toe - onychocryptosis bilateral border.  Mild inflammation and tenderness along entire lateral border.  Medially she is sore at the distasl aspect of the nail  MSK:    ROM, strength wnl without limitation, no pain on palpation noted.  Calf:    Neg for redness, swelling or tenderness      Assessment:  74 year old female with onychocryptosis bilateral border R hallux nail      Plan:  Discussed etiologies, anatomy and options  1.  onychocrytposis bilateral border R hallux nail  -Regarding the nail, treatment options were discussed.  They elected to proceed with a procedure, Partial temporary avulsion.  See procedure note for details.  Risks that were discussed include but are not limited to infection, wound healing complications, nerve irritation, recurrence of the ingrown nail and the need for further procedures.  Antibiotic:  None needed  -slantback performed medially, leading edge  and removed with nail clipper - no charge    After " discussing the procedure, as well as risks, complications and post-procedure instructions, informed consent was obtained.    Anesthesia:  4 cc's of  1% lidocaine plain    Procedure:  After adequate prep, and with anesthesia achieved,  attention was directed to the lateral border of the R hallux where the nail plate was freed from surrounding soft tissue.  The offending border was  using an English Anvil and then removed in total.  The base of the wound was explored and showed no necrotic tissue, purulence or debris.   A clean dressing was applied loosely to prevent vascular insult.  The patient tolerated the procedure well without complications.    Post-procedural instructions were dispensed and discussed with the patient.  All questions were answered.          Follow up:  prn or sooner with acute issues      Body mass index is 36.06 kg/m .  Weight management plan: Patient was referred to their PCP to discuss a diet and exercise plan.         Shlomo Booth DPM FACFAS FACFAOM  Podiatric Foot & Ankle Surgeon  [facility]  [phone]

## 2019-12-16 NOTE — PATIENT INSTRUCTIONS
Thank you for choosing [facility] Podiatry / Foot & Ankle Surgery!    DR. BAUTISTA'S CLINIC LOCATIONS:   MONDAY - [place] TUESDAY AM - [place]   [address] [facility]  [address]   [place] [address] [place] [address]   [phone]       THURSDAY AM - [place] THURSDAY PM - [place]   [address]   [place] [address] [place] [address]   [phone]       FRIDAY AM - [place] SET UP SURGERY: [phone] [address] APPOINTMENTS: [phone]   [place] [address] BILLING QUESTIONS: [phone] FAX NUMBER: [phone]     [place] TOENAIL REMOVAL AFTERCARE  1. After the procedure, go home and elevate the foot/feet for the remainder of the day/evening as able. This is to minimize swelling, control pain, and limit post-procedural complications. The pre-procedural injection may cause your toe to be numb anywhere from 1-2 hours.    2. You can take Tylenol, Ibuprofen, Advil, etc as needed for pain if tolerated. Follow label instructions.     3. If you have been given a prescription for antibiotics, take them as instructed and complete the entire prescription.    4. Keep dressing intact until the following morning. Then remove the bandage (you may need to soak it in warm soapy water as the bandage will likely adhere to your skin).    5. Start soaking in warm soapy water for 5-10 minutes twice a day. Wash the toe thoroughly, dry the toe thoroughly. Apply antibiotic wound ointment to base of wound and cover with gauze and Coban dressing (not too tightly) until it stops draining. This may take a few days to weeks, but at that point, you may continue with antibiotic ointment and a band-aid, or you may stop applying a dressing all together. Dressing changes should be done twice daily if you had the permanent/chemical procedure done.    6. You may do activities as tolerated the following day. Find a shoe that is  comfortable and minimizes the amount of rubbing on your toe, as this may increase pain, swelling, etc.    7. Monitor for signs of infection. With this procedure, it is common to have mild surrounding redness and drainage. If the redness involves the entire toe or if you notice red streaks on top of your foot, or if you experience any nausea, vomiting, chills, fevers > 101 degrees, call clinic for a quick appointment.

## 2020-01-16 ENCOUNTER — APPOINTMENT (OUTPATIENT)
Dept: SURGERY | Facility: PHYSICIAN GROUP | Age: 75
End: 2020-01-16
Payer: COMMERCIAL

## 2020-01-16 ENCOUNTER — HOSPITAL ENCOUNTER (OUTPATIENT)
Facility: CLINIC | Age: 75
Discharge: HOME | End: 2020-01-16
Attending: SURGERY | Admitting: SURGERY
Payer: COMMERCIAL

## 2020-01-16 VITALS
SYSTOLIC BLOOD PRESSURE: 93 MMHG | WEIGHT: 210 LBS | HEART RATE: 90 BPM | OXYGEN SATURATION: 96 % | BODY MASS INDEX: 35.85 KG/M2 | RESPIRATION RATE: 15 BRPM | HEIGHT: 64 IN | DIASTOLIC BLOOD PRESSURE: 65 MMHG

## 2020-01-16 LAB
COLONOSCOPY: NORMAL
GLUCOSE BLDC GLUCOMTR-MCNC: 192 MG/DL (ref 70–99)

## 2020-01-16 PROCEDURE — 45380 COLONOSCOPY AND BIOPSY: CPT | Mod: PT | Performed by: SURGERY

## 2020-01-16 PROCEDURE — 25000128 H RX IP 250 OP 636: Performed by: SURGERY

## 2020-01-16 PROCEDURE — 45380 COLONOSCOPY AND BIOPSY: CPT | Performed by: SURGERY

## 2020-01-16 PROCEDURE — 88305 TISSUE EXAM BY PATHOLOGIST: CPT | Performed by: SURGERY

## 2020-01-16 PROCEDURE — G0500 MOD SEDAT ENDO SERVICE >5YRS: HCPCS | Mod: PT | Performed by: SURGERY

## 2020-01-16 PROCEDURE — 99153 MOD SED SAME PHYS/QHP EA: CPT | Performed by: SURGERY

## 2020-01-16 PROCEDURE — G0500 MOD SEDAT ENDO SERVICE >5YRS: HCPCS | Performed by: SURGERY

## 2020-01-16 PROCEDURE — 88305 TISSUE EXAM BY PATHOLOGIST: CPT | Mod: 26 | Performed by: SURGERY

## 2020-01-16 PROCEDURE — 25000128 H RX IP 250 OP 636: Performed by: PHYSICIAN ASSISTANT

## 2020-01-16 PROCEDURE — 82962 GLUCOSE BLOOD TEST: CPT

## 2020-01-16 RX ORDER — LIDOCAINE 40 MG/G
CREAM TOPICAL
Status: DISCONTINUED | OUTPATIENT
Start: 2020-01-16 | End: 2020-01-16 | Stop reason: HOSPADM

## 2020-01-16 RX ORDER — ONDANSETRON 2 MG/ML
4 INJECTION INTRAMUSCULAR; INTRAVENOUS
Status: COMPLETED | OUTPATIENT
Start: 2020-01-16 | End: 2020-01-16

## 2020-01-16 RX ORDER — FLUMAZENIL 0.1 MG/ML
0.2 INJECTION, SOLUTION INTRAVENOUS
Status: CANCELLED | OUTPATIENT
Start: 2020-01-16 | End: 2020-01-16

## 2020-01-16 RX ORDER — ONDANSETRON 2 MG/ML
4 INJECTION INTRAMUSCULAR; INTRAVENOUS EVERY 6 HOURS PRN
Status: CANCELLED | OUTPATIENT
Start: 2020-01-16

## 2020-01-16 RX ORDER — FENTANYL CITRATE 50 UG/ML
INJECTION, SOLUTION INTRAMUSCULAR; INTRAVENOUS PRN
Status: DISCONTINUED | OUTPATIENT
Start: 2020-01-16 | End: 2020-01-16 | Stop reason: HOSPADM

## 2020-01-16 RX ORDER — ONDANSETRON 4 MG/1
4 TABLET, ORALLY DISINTEGRATING ORAL EVERY 6 HOURS PRN
Status: CANCELLED | OUTPATIENT
Start: 2020-01-16

## 2020-01-16 RX ORDER — NALOXONE HYDROCHLORIDE 0.4 MG/ML
.1-.4 INJECTION, SOLUTION INTRAMUSCULAR; INTRAVENOUS; SUBCUTANEOUS
Status: CANCELLED | OUTPATIENT
Start: 2020-01-16 | End: 2020-01-17

## 2020-01-16 RX ORDER — ONDANSETRON 2 MG/ML
4 INJECTION INTRAMUSCULAR; INTRAVENOUS
Status: DISCONTINUED | OUTPATIENT
Start: 2020-01-16 | End: 2020-01-16 | Stop reason: HOSPADM

## 2020-01-16 ASSESSMENT — MIFFLIN-ST. JEOR: SCORE: 1437.55

## 2020-01-16 NOTE — LETTER
December 12, 2019      Debi Mo  [address] [place] [address]        Dear Debi,  Please be aware that coverage of these services is subject to the terms and limitations of your health insurance plan.  Call member services at your health plan with any benefit or coverage questions.    Thank you for choosing [facility]. You are scheduled for the following service(s):    Date:1/16/2020             Procedure:  COLONOSCOPY  Doctor:      Hernesto   Arrival Time: 8 am  *Check in at Emergency/Endoscopy desk*  Procedure Time: 8.30 am      Location:   [facility]        Endoscopy Department, First Floor (Enter through ER Doors) *        [address] [place], [place] [address] [phone] or [phone] () to reschedule      MIRALAX -GATORADE  PREP  Colonoscopy is the most accurate test to detect colon polyps and colon cancer; and the only test where polyps can be removed. During this procedure, a doctor examines the lining of your large intestine and rectum through a flexible tube.   Transportation  You must arrange for a ride for the day of your procedure with a responsible adult. A taxi , Uber, etc, is not an option unless you are accompanied by a responsible adult. If you fail to arrange transportation with a responsible adult, your procedure will be cancelled and rescheduled.    Purchase the  following supplies at your local pharmacy:  - 2 (two) bisacodyl tablets: each tablet contains 5 mg.  (Dulcolax  laxative NOT Dulcolax  stool softener)   - 1 (one) 8.3 oz bottle of Polyethylene Glycol (PEG) 3350 Powder   (MiraLAX , Smooth LAX , ClearLAX  or equivalent)  - 64 oz Gatorade    Regular Gatorade, Gatorade G2 , Powerade , Powerade Zero  or Pedialyte  is acceptable. Red colored flavors are not allowed; all other colors (yellow, green, orange, purple and blue) are okay. It is also okay to buy two 2.12 oz packets of powdered Gatorade that can  be mixed with water to a total volume of 64 oz of liquid.  - 1 (one) 10 oz bottle of Magnesium Citrate (Red colored flavors are not allowed)  It is also okay for you to use a 0.5 oz package of powdered magnesium citrate (17 g) mixed with 10 oz of water.      PREPARATION FOR COLONOSCOPY    7 days before:    Discontinue fiber supplements and medications containing iron. This includes Metamucil  and Fibercon ; and multivitamins with iron.    3 days before:    Begin a low-fiber diet. A low-fiber diet helps making the cleanout more effective.     Examples of a low-fiber diet include (but are not limited to): white bread, white rice, pasta, crackers, fish, chicken, eggs, ground beef, creamy peanut butter, cooked/steamed/boiled vegetables, canned fruit, bananas, melons, milk, plain yogurt cheese, salad dressing and other condiments.     The following are not allowed on a low-fiber diet: seeds, nuts, popcorn, bran, whole wheat, corn, quinoa, raw fruits and vegetables, berries and dried fruit, beans and lentils.    For additional details on low-fiber diet, please refer to the table on the last page.    2 days before:    Continue the low-fiber diet.     Drink at least 8 glasses of water throughout the day.     Stop eating solid foods at 11:45 pm.    1 day before:    In the morning: begin a clear liquid diet (liquids you can see through).     Examples of a clear liquid diet include: water, clear broth or bouillon, Gatorade, Pedialyte or Powerade, carbonated and non-carbonated soft drinks (Sprite , 7-Up , ginger ale), strained fruit juices without pulp (apple, white grape, white cranberry), Jell-O  and popsicles.     The following are not allowed on a clear liquid diet: red liquids, alcoholic beverages, dairy products (milk, creamer, and yogurt), protein shakes, creamy broths, juice with pulp and chewing tobacco.    At noon: take 2 (two) bisacodyl tablets     At 4 (and no later than 6pm): start drinking the Miralax-Gatorade  preparation (8.3 oz of Miralax mixed with 64 oz of Gatorade in a large pitcher). Drink 1(one) 8 oz glass every 15 minutes thereafter, until the mixture is gone.    COLON CLEANSING TIPS: drink adequate amounts of fluids before and after your colon cleansing to prevent dehydration. Stay near a toilet because you will have diarrhea. Even if you are sitting on the toilet, continue to drink the cleansing solution every 15 minutes. If you feel nauseous or vomit, rinse your mouth with water, take a 15 to 30-minute-break and then continue drinking the solution. You will be uncomfortable until the stool has flushed from your colon (in about 2 to 4 hours). You may feel chilled.    Day of your procedure  You may take all of your morning medications including blood pressure medications, blood thinners (if you have not been instructed to stop these by our office), methadone, anti-seizure medications with sips of water 3 hours prior to your procedure or earlier. Do not take insulin or vitamins prior to your procedure. Continue the clear liquid diet.       4 hours prior: drink 10 oz of magnesium citrate. It may be easier to drink it with a straw.    STOP consuming all liquids after that.     Do not take anything by mouth during this time.     Allow extra time to travel to your procedure as you may need to stop and use a restroom along the way.    You are ready for the procedure, if you followed all instructions and your stool is no longer formed, but clear or yellow liquid. If you are unsure whether your colon is clean, please call our office at [phone] before you leave for your appointment.    Bring the following to your procedure:  - Insurance Card/Photo ID.   - List of current medications including over-the-counter medications and supplements.   - Your rescue inhaler if you currently use one to control asthma.    Canceling or rescheduling your appointment:   If you must cancel or reschedule your appointment, please call  [phone] as soon as possible.      COLONOSCOPY PRE-PROCEDURE CHECKLIST    If you have diabetes, ask your regular doctor for diet and medication restrictions.  If you take an anticoagulant or anti-platelet medication (such as Coumadin , Lovenox , Pradaxa , Xarelto , Eliquis , etc.), please call your primary doctor for advice on holding this medication.  If you take aspirin you may continue to do so.  If you are or may be pregnant, please discuss the risks and benefits of this procedure with your doctor.        What happens during a colonoscopy?    Plan to spend up to two hours, starting at registration time, at the endoscopy center the day of your procedure. The colonoscopy takes an average of 15 to 30 minutes. Recovery time is about 30 minutes.      Before the exam:    You will change into a gown.    Your medical history and medication list will be reviewed with you, unless that has been done over the phone prior to the procedure.     A nurse will insert an intravenous (IV) line into your hand or arm.    The doctor will meet with you and will give you a consent form to sign.  During the exam:     Medicine will be given through the IV line to help you relax.     Your heart rate and oxygen levels will be monitored. If your blood pressure is low, you may be given fluids through the IV line.     The doctor will insert a flexible hollow tube, called a colonoscope, into your rectum. The scope will be advanced slowly through the large intestine (colon).    You may have a feeling of fullness or pressure.     If an abnormal tissue or a polyp is found, the doctor may remove it through the endoscope for closer examination, or biopsy. Tissue removal is painless    After the exam:           Any tissue samples removed during the exam will be sent to a lab for evaluation. It may take 5-7 working days for you to be notified of the results.     A nurse will provide you with complete discharge instructions before you leave the  endoscopy center. Be sure to ask the nurse for specific instructions if you take blood thinners such as Aspirin, Coumadin or Plavix.     The doctor will prepare a full report for you and for the physician who referred you for the procedure.     Your doctor will talk with you about the initial results of your exam.      Medication given during the exam will prohibit you from driving for the rest of the day.     Following the exam, you may resume your normal diet. Your first meal should be light, no greasy foods. Avoid alcohol until the next day.     You may resume your regular activities the day after the procedure.         LOW-FIBER DIET    Foods RECOMMENDED Foods to AVOID   Breads, Cereal, Rice and Pasta:   White bread, rolls, biscuits, croissant and sindy toast.   Waffles, Bulgarian toast and pancakes.   White rice, noodles, pasta, macaroni and peeled cooked potatoes.   Plain crackers and saltines.   Cooked cereals: farina, cream of rice.   Cold cereals: Puffed Rice , Rice Krispies , Corn Flakes  and Special K    Breads, Cereal, Rice and Pasta:   Breads or rolls with nuts, seeds or fruit.   Whole wheat, pumpernickel, rye breads and cornbread.   Potatoes with skin, brown or wild rice, and kasha (buckwheat).     Vegetables:   Tender cooked and canned vegetables without seeds: carrots, asparagus tips, green or wax beans, pumpkin, spinach, lima beans. Vegetables:   Raw or steamed vegetables.   Vegetables with seeds.   Sauerkraut.   Winter squash, peas, broccoli, Brussel sprouts, cabbage, onions, cauliflower, baked beans, peas and corn.   Fruits:   Strained fruit juice.   Canned fruit, except pineapple.   Ripe bananas and melon. Fruits:   Prunes and prune juice.   Raw fruits.   Dried fruits: figs, dates and raisins.   Milk/Dairy:   Milk: plain or flavored.   Yogurt, custard and ice cream.   Cheese and cottage cheese Milk/Dairy:     Meat and other proteins:   ground, well-cooked tender beef, lamb, ham, veal, pork, fish,  poultry and organ meats.   Eggs.   Peanut butter without nuts. Meat and other proteins:   Tough, fibrous meats with gristle.   Dry beans, peas and lentils.   Peanut butter with nuts.   Tofu.   Fats, Snack, Sweets, Condiments and Beverages:   Margarine, butter, oils, mayonnaise, sour cream and salad dressing, plain gravy.   Sugar, hard candy, clear jelly, honey and syrup.   Spices, cooked herbs, bouillon, broth and soups made with allowed vegetable, ketchup and mustard.   Coffee, tea and carbonated drinks.   Plain cakes, cookies and pretzels.   Gelatin, plain puddings, custard, ice cream, sherbet and popsicles. Fats, Snack, Sweets, Condiments and Beverages:   Nuts, seeds and coconut.   Jam, marmalade and preserves.   Pickles, olives, relish and horseradish.   All desserts containing nuts, seeds, dried fruit and coconut; or made from whole grains or bran.   Candy made with nuts or seeds.   Popcorn.           DIRECTIONS TO THE ENDOSCOPY DEPARTMENT     From the north ([facility])  Take [address] South, exit on [address] [address]. Get into the left hand beka, turn left (east), go one-half mile to [address] and turn left. Go north to the first stoplight, take a right on [address] and follow it to the Emergency entrance.    From the south ([facility])  Take 35N to the 35E split and exit on [address] [address]. On [address] [address], turn left (west) to [address]. Turn right (north) on [address]. Go north to the first stoplight, take a right on [address] and follow it to the Emergency entrance.    From the east via 35E ([facility])  Take [address]E south to [address] [address] exit. Turn right on [address] [address]. Go west to [address]. Turn right (north) on [address]. Go to the first stoplight, take a right and follow on [address] to the Emergency entrance.    From the east via [address] ([facility])  Take [address] West to [address]. Turn left (south) on  [address] to [address]. Turn left (east) on [address] and follow it to the Emergency entrance.    From the west via [address] ([place])  Take [address] east to [address]. Turn right (south) on [address] to [address]. Turn left (east) on [address] and follow it to the Emergency entrance.

## 2020-01-16 NOTE — PRE-PROCEDURE
GENERAL PRE-PROCEDURE:   Procedure:  Colonoscopy  Date/Time:  1/16/2020 8:31 AM    Written consent obtained?: Yes    Risks and benefits: Risks, benefits and alternatives were discussed    Consent given by:  Patient  Patient states understanding of procedure being performed: Yes    Patient's understanding of procedure matches consent: Yes    Procedure consent matches procedure scheduled: Yes    Expected level of sedation:  Moderate  Appropriately NPO:  Yes  ASA Class:  Class 2- mild systemic disease, no acute problems, no functional limitations  Mallampati  :  Grade 3- soft palate visible, posterior pharyngeal wall not visible  Lungs:  Lungs clear with good breath sounds bilaterally  Heart:  Normal heart sounds and rate  History & Physical reviewed:  History and physical reviewed and no updates needed  Statement of review:  I have reviewed the lab findings, diagnostic data, medications, and the plan for sedation

## 2020-01-16 NOTE — LETTER
[facility]    [address] [place] [address]           Debi Mo  [address] [place] [id number] January 20, 2020      Dear Debi,    This letter is to inform you of the results of your pathology report on your colonoscopy.  Your pathology report:   FINAL DIAGNOSIS:   Colon, sigmoid, polypectomy:   - Ulceration and granulation tissue, consistent with inflammatory polyp   - Negative for adenoma and malignancy.   You do not need any further colonoscopies for screening.  If you have any further questions, please do not hesitate to call: [facility] [phone].    Sincerely,     Katie Eric MD  [facility]        Understanding Your Pathology Report:   Colon Polyps   When your colon was biopsied, the samples taken were studied under the microscope by a Pathologist. The pathologist sends your doctor a report that gives a diagnosis for each sample taken. This report helps manage your care. The questions and answers that follow are meant to help you understand the medical language used in the pathology report you received for your biopsy.   What if my report mentions cecum, ascending colon, transverse colon, descending colon, sigmoid colon, or rectum?  The cecum is the beginning of the colon where the small intestine empties into the large intestine. The ascending colon, transverse colon, descending colon, sigmoid colon, and rectum are other parts of the colon after the cecum. The colon ends at the rectum and waste exits through the anus.  What is a polyp in the colon?  A polyp is a projection (growth) of tissue from the inner lining of the colon into the lumen (hollow center) of the colon. Different types of polyps look different under the microscope. Polyps are benign (non-cancerous) growths, but cancer can start in some types of polyps.   What is an adenoma?  An adenoma is a polyp made up of tissue that looks much like the normal  lining of your colon, although it is different in several important ways when it is looked at under the microscope. In some cases, a cancer can arise in the adenoma.   What are tubular adenomas, tubulovillous adenomas, and villous adenomas?  Adenomas have several different growth patterns that can be seen under the microscope by the pathologist. There are 2 major growth patterns: tubular and villous. Because many adenomas have a mixture of both growth patterns, some polyps may be called tubulovillous adenomas.  The most important thing is that your polyp has been completely removed and does not show cancer. The growth pattern is only important because it helps determine when you will need your next colonoscopy to make sure you don t develop colon cancer in the future.    What are hyperplastic polyps?  Hyperplastic polyps are totally benign (they aren t pre-cancers or cancers) and are not a cause for concern.       What does it mean if I have an adenoma, such as a sessile serrated adenoma, or an adenomatous polyp?  These types of polyps are not cancer, but are pre-cancerous (can turn into cancers). Someone who has had one of these types of polyps has an increased risk of later developing cancer of the colon. Most patients with these polyps, however, never develop cancer.   What if my report mentions dysplasia?  Dysplasia is a term that describes how much your polyp looks like cancer under the microscope. Polyps that are only mildly abnormal (don t look much like cancer) are said to have low-grade (mild or moderate) dysplasia. Polyps that are more abnormal and look more like cancer are said to have high-grade (severe) dysplasia. If dysplasia is found in your polyp, it might mean you need to have a repeat (follow-up) colonoscopy sooner than if dysplasia wasn t found, but otherwise you do not need to worry about dysplasia in your polyp.   How does having the various types of adenoma affect my future treatment?  Since  you had an adenoma, you will need to have another colonoscopy to make sure that you don t develop any more adenomas. When your next colonoscopy should be scheduled depends on a number of things, like how many adenomas were found, if any were villous, and if any had dysplasia. The timing of your next colonoscopy should be discussed with your treating doctor as he or she knows the details of your specific case.  What if my adenoma was not completely removed?  If your adenoma was biopsied but not completely removed, you will need talk to your doctor about what other treatment you ll need. Most of the time, all adenomas need to be completely removed. Sometimes, though, the adenoma may be too large to remove during colonoscopy. In such cases you may need surgery to have the adenoma removed.

## 2020-01-16 NOTE — DISCHARGE INSTRUCTIONS
Understanding Diverticulosis and Diverticulitis     Pouches or diverticula usually occur in the lower part of the colon called the sigmoid.      Diverticulitis occurs when the pouches become inflamed.     The colon (large intestine) is the last part of the digestive tract. It absorbs water from stool and changes it from a liquid to a solid. In certain cases, small pouches called diverticula can form in the colon wall. This condition is called diverticulosis. The pouches can become infected. If this happens, it becomes a more serious problem called diverticulitis. These problems can be painful. But they can be managed.   Managing Your Condition  Diet changes or taking medications are often tried first. These may be enough to bring relief. If the case is bad, surgery may be done. You and your doctor can discuss the plan that is best for you.  If You Have Diverticulosis  Diet changes are often enough to control symptoms. The main changes are adding fiber (roughage) and drinking more water. Fiber absorbs water as it travels through your colon. This helps your stool stay soft and move smoothly. Water helps this process. If needed, you may be told to take over-the-counter stool softeners. To help relieve pain, antispasmodic medications may be prescribed.  If You Have Diverticulitis  Treatment depends on how bad your symptoms are.  For mild symptoms: You may be put on a liquid diet for a short time. You may also be prescribed antibiotics. If these two steps relieve your symptoms, you may then be prescribed a high-fiber diet. If you still have symptoms, your doctor will discuss further treatment options with you.  For severe symptoms: You may need to be admitted to the hospital. There, you can be given IV antibiotics and fluids. Once symptoms are under control, the above treatments may be tried. If these don t control your condition, your doctor may discuss the option of having surgery with you.  [place] to Colon  Health  Help keep your colon healthy with a diet that includes plenty of high-fiber fruits, vegetables, and whole grains. Drink plenty of liquids like water and juice. Your doctor may also recommend avoiding seeds and nuts.          [id number] Abigail Garcia, [address] [place] [address], [place] [address]. All rights reserved. This information is not intended as a substitute for professional medical care. Always follow your healthcare professional's instructions.    Understanding Colon and Rectal Polyps     The colon has a smooth lining composed of millions of cells.     The colon (also called the large intestine) is a muscular tube that forms the last part of the digestive tract. It absorbs water and stores food waste. The colon is about 4 to 6 feet long. The rectum is the last 6 inches of the colon. The colon and rectum have a smooth lining composed of millions of cells. Changes in these cells can lead to growths in the colon that can become cancerous and should be removed.     When the Colon Lining Changes  Changes that occur in the cells that line the colon or rectum can lead to growths called polyps. Over a period of years, polyps can turn cancerous. Removing polyps early may prevent cancer from ever forming.      Polyps  Polyps are fleshy clumps of tissue that form on the lining of the colon or rectum. Small polyps are usually benign (not cancerous). However, over time, cells in a polyp can change and become cancerous. The larger a polyp grows, the more likely this is to happen. Also, certain types of polyps known as adenomatous polyps are considered premalignant. This means that they will almost always become cancerous if they re not removed.          Cancer  Almost all colorectal cancers start when polyp cells begin growing abnormally. As a cancerous tumor grows, it may involve more and more of the colon or rectum. In time, cancer can also grow beyond the colon or rectum and spread to nearby organs or to glands  called lymph nodes. The cells can also travel to other parts of the body. This is known as metastasis. The earlier a cancerous tumor is removed, the better the chance of preventing its spread.        [id number] Abigail Garcia, [address] [place] [address], [place] [address]. All rights reserved. This information is not intended as a substitute for professional medical care. Always follow your healthcare professional's instructions.

## 2020-01-17 LAB — COPATH REPORT: NORMAL

## 2020-01-19 DIAGNOSIS — N20.0 KIDNEY STONE: ICD-10-CM

## 2020-01-20 RX ORDER — POTASSIUM CITRATE AND CITRIC ACID MONOHYDRATE 1100; 334 MG/5ML; MG/5ML
SOLUTION ORAL
Qty: 1800 ML | Refills: 1 | Status: SHIPPED | OUTPATIENT
Start: 2020-01-20 | End: 2020-04-17

## 2020-02-25 NOTE — TELEPHONE ENCOUNTER
PRIOR AUTHORIZATION DENIED        Medication: potassium citrate-cit [id number] soln - P/A DENIED    Denial Date: 9/30/2019    Denial Rational:     Appeal Information:       Sent to HIM for scanning.

## 2020-03-22 ENCOUNTER — HEALTH MAINTENANCE LETTER (OUTPATIENT)
Age: 75
End: 2020-03-22

## 2020-04-16 ENCOUNTER — TELEPHONE (OUTPATIENT)
Dept: PEDIATRICS | Facility: CLINIC | Age: 75
End: 2020-04-16

## 2020-04-16 NOTE — TELEPHONE ENCOUNTER
Dr Brown, Pt has Phone visit on 4/23/2020, does she needs labs prior? Please advise further and we can get her scheduled if needed. ~Thank You Kindly      Pt's Number:#[phone] , detailed message ok.     Uma Pearson,     on 4/16/2020 at 9:36 AM

## 2020-04-16 NOTE — TELEPHONE ENCOUNTER
Please help schedule labs before visit with Dr Brown,    Jeni Chawla MD  Internal Medicine - Pediatrics

## 2020-04-17 DIAGNOSIS — N20.0 KIDNEY STONE: ICD-10-CM

## 2020-04-17 RX ORDER — POTASSIUM CITRATE AND CITRIC ACID MONOHYDRATE 1100; 334 MG/5ML; MG/5ML
SOLUTION ORAL
Qty: 1800 ML | Refills: 1 | Status: SHIPPED | OUTPATIENT
Start: 2020-04-17 | End: 2020-08-13

## 2020-04-18 DIAGNOSIS — I25.10 CORONARY ARTERY DISEASE INVOLVING NATIVE CORONARY ARTERY OF NATIVE HEART WITHOUT ANGINA PECTORIS: ICD-10-CM

## 2020-04-18 DIAGNOSIS — I10 BENIGN ESSENTIAL HYPERTENSION: ICD-10-CM

## 2020-04-18 DIAGNOSIS — E11.42 TYPE 2 DIABETES MELLITUS WITH DIABETIC POLYNEUROPATHY, WITHOUT LONG-TERM CURRENT USE OF INSULIN (H): ICD-10-CM

## 2020-04-18 DIAGNOSIS — E53.8 VITAMIN B12 DEFICIENCY WITHOUT ANEMIA: ICD-10-CM

## 2020-04-18 LAB
ALBUMIN SERPL-MCNC: 3.6 G/DL (ref 3.4–5)
ALP SERPL-CCNC: 90 U/L (ref 40–150)
ALT SERPL W P-5'-P-CCNC: 34 U/L (ref 0–50)
ANION GAP SERPL CALCULATED.3IONS-SCNC: 7 MMOL/L (ref 3–14)
AST SERPL W P-5'-P-CCNC: 30 U/L (ref 0–45)
BILIRUB SERPL-MCNC: 0.8 MG/DL (ref 0.2–1.3)
BUN SERPL-MCNC: 21 MG/DL (ref 7–30)
CALCIUM SERPL-MCNC: 10.4 MG/DL (ref 8.5–10.1)
CHLORIDE SERPL-SCNC: 103 MMOL/L (ref 94–109)
CHOLEST SERPL-MCNC: 148 MG/DL
CO2 SERPL-SCNC: 24 MMOL/L (ref 20–32)
CREAT SERPL-MCNC: 0.94 MG/DL (ref 0.52–1.04)
CREAT UR-MCNC: 137 MG/DL
GFR SERPL CREATININE-BSD FRML MDRD: 59 ML/MIN/{1.73_M2}
GLUCOSE SERPL-MCNC: 192 MG/DL (ref 70–99)
HBA1C MFR BLD: 7.8 % (ref 0–5.6)
HDLC SERPL-MCNC: 35 MG/DL
LDLC SERPL CALC-MCNC: ABNORMAL MG/DL
LDLC SERPL DIRECT ASSAY-MCNC: 71 MG/DL
MICROALBUMIN UR-MCNC: 43 MG/L
MICROALBUMIN/CREAT UR: 31.68 MG/G CR (ref 0–25)
NONHDLC SERPL-MCNC: 113 MG/DL
POTASSIUM SERPL-SCNC: 4.4 MMOL/L (ref 3.4–5.3)
PROT SERPL-MCNC: 7.7 G/DL (ref 6.8–8.8)
SODIUM SERPL-SCNC: 134 MMOL/L (ref 133–144)
TRIGL SERPL-MCNC: 409 MG/DL
VIT B12 SERPL-MCNC: 565 PG/ML (ref 193–986)

## 2020-04-18 PROCEDURE — 82043 UR ALBUMIN QUANTITATIVE: CPT | Performed by: INTERNAL MEDICINE

## 2020-04-18 PROCEDURE — 83721 ASSAY OF BLOOD LIPOPROTEIN: CPT | Mod: 59 | Performed by: INTERNAL MEDICINE

## 2020-04-18 PROCEDURE — 82607 VITAMIN B-12: CPT | Performed by: INTERNAL MEDICINE

## 2020-04-18 PROCEDURE — 80053 COMPREHEN METABOLIC PANEL: CPT | Performed by: INTERNAL MEDICINE

## 2020-04-18 PROCEDURE — 83036 HEMOGLOBIN GLYCOSYLATED A1C: CPT | Performed by: INTERNAL MEDICINE

## 2020-04-18 PROCEDURE — 80061 LIPID PANEL: CPT | Performed by: INTERNAL MEDICINE

## 2020-04-18 PROCEDURE — 36415 COLL VENOUS BLD VENIPUNCTURE: CPT | Performed by: INTERNAL MEDICINE

## 2020-04-22 NOTE — PROGRESS NOTES
Last Ov w Dr. Dietrich 10/2019  (E11.42) Type 2 diabetes mellitus with diabetic polyneuropathy, without long-term current use of insulin (H)  (primary encounter diagnosis)  -a1c was 6.9 in September, goal considering CAD is under 8  -currently on metformin for blood sugar control; discussed diet and exercise to help improve control   -also on asa, statin and ace  -eye exam UTD, foot exam today was normal  -recheck in 6 months   -encouraged patient to schedule podiatry appointment for toenail.    Plan: metFORMIN (GLUCOPHAGE) 500 MG tablet,         Hemoglobin A1c, Lipid panel reflex to direct         LDL Fasting, Comprehensive metabolic panel,         Albumin Random Urine Quantitative with Creat         Ratio     (I10) Benign essential hypertension  -bp at goal on lisinopril  -check renal function at next visit   Plan: lisinopril (PRINIVIL/ZESTRIL) 20 MG tablet,         Comprehensive metabolic panel       (I25.10) Coronary artery disease involving native coronary artery of native heart without angina pectoris  -currently asymptomatic, no history of MI; found on cath in 2012  -goal is risk factor reduction, on asa, statin  Plan: Lipid panel reflex to direct LDL Fasting     (Z87.442) History of nephrolithiasis  -followed by urology  -on potassium to help prevent further stones     (E66.01) Morbid obesity due to excess calories (H)  -discussed importance of diet, exercise and weight loss      (E53.8) Vitamin B12 deficiency without anemia  Plan: Vitamin B12    ------------    # DM   Ref. Range 4/19/2019 09:53 9/11/2019 15:29 4/18/2020 09:59   Hemoglobin A1C Latest Ref Range: 0 - 5.6 % 7.0 (H) 6.9 (H) 7.8 (H)   goal considering CAD is under 8  -currently on metformin 100mg BID for blood sugar control; discussed diet and exercise to help improve control   -also on asa, statin (simvastatin 20 mg) and ace (lisinopril 20 mg)  -eye exam UTD, foot exam today was normal  -recheck in 6 months   -encouraged patient to schedule  podiatry appointment for toenail.      # Vitamin B12 deficiency without anemia   Ref. Range 4/18/2020 09:59   Vitamin B12 Latest Ref Range: 193 - 986 pg/mL 565   - continue supplementation    # Vitamin D def  - vitamin D 4000 international unit(s) daily  - recheck with next labs    # History of nephrolithiasis  -followed by urology  -on potassium to help prevent further stones    # HCM   - due for eye exam  - mammo due 4/2020  - utd on colon cancer screening (2030)

## 2020-04-23 ENCOUNTER — VIRTUAL VISIT (OUTPATIENT)
Dept: PEDIATRICS | Facility: CLINIC | Age: 75
End: 2020-04-23
Payer: COMMERCIAL

## 2020-04-23 DIAGNOSIS — E11.42 TYPE 2 DIABETES MELLITUS WITH DIABETIC POLYNEUROPATHY, WITHOUT LONG-TERM CURRENT USE OF INSULIN (H): ICD-10-CM

## 2020-04-23 DIAGNOSIS — Z12.31 ENCOUNTER FOR SCREENING MAMMOGRAM FOR BREAST CANCER: ICD-10-CM

## 2020-04-23 DIAGNOSIS — E53.8 VITAMIN B12 DEFICIENCY WITHOUT ANEMIA: ICD-10-CM

## 2020-04-23 DIAGNOSIS — R19.7 DIARRHEA, UNSPECIFIED TYPE: ICD-10-CM

## 2020-04-23 DIAGNOSIS — Z76.89 ENCOUNTER TO ESTABLISH CARE: Primary | ICD-10-CM

## 2020-04-23 DIAGNOSIS — E83.52 HYPERCALCEMIA: Primary | ICD-10-CM

## 2020-04-23 DIAGNOSIS — E78.1 HIGH TRIGLYCERIDES: ICD-10-CM

## 2020-04-23 DIAGNOSIS — E78.5 HYPERLIPIDEMIA LDL GOAL <70: ICD-10-CM

## 2020-04-23 DIAGNOSIS — E55.9 VITAMIN D DEFICIENCY: ICD-10-CM

## 2020-04-23 DIAGNOSIS — Z87.442 HISTORY OF NEPHROLITHIASIS: ICD-10-CM

## 2020-04-23 DIAGNOSIS — J30.1 CHRONIC SEASONAL ALLERGIC RHINITIS DUE TO POLLEN: ICD-10-CM

## 2020-04-23 PROCEDURE — 99213 OFFICE O/P EST LOW 20 MIN: CPT | Mod: 95 | Performed by: INTERNAL MEDICINE

## 2020-04-23 RX ORDER — LANOLIN ALCOHOL/MO/W.PET/CERES
1000 CREAM (GRAM) TOPICAL DAILY
Qty: 100 TABLET | Refills: 3 | Status: SHIPPED | OUTPATIENT
Start: 2020-04-23 | End: 2020-08-13

## 2020-04-23 RX ORDER — LORATADINE 10 MG/1
10 TABLET ORAL DAILY
Qty: 90 TABLET | Refills: 3 | Status: SHIPPED | OUTPATIENT
Start: 2020-04-23 | End: 2020-08-13

## 2020-04-23 RX ORDER — ATORVASTATIN CALCIUM 20 MG/1
20 TABLET, FILM COATED ORAL DAILY
Status: CANCELLED | OUTPATIENT
Start: 2020-04-23

## 2020-04-23 RX ORDER — SIMVASTATIN 20 MG
20 TABLET ORAL AT BEDTIME
Qty: 90 TABLET | Refills: 1 | Status: SHIPPED | OUTPATIENT
Start: 2020-04-23 | End: 2020-08-13

## 2020-04-23 NOTE — PROGRESS NOTES
"Debi Mo is a 75 year old female who is being evaluated via a billable telephone visit.      The patient has been notified of following:     \"This telephone visit will be conducted via a call between you and your physician/provider. We have found that certain health care needs can be provided without the need for a physical exam.  This service lets us provide the care you need with a short phone conversation.  If a prescription is necessary we can send it directly to your pharmacy.  If lab work is needed we can place an order for that and you can then stop by our lab to have the test done at a later time.    Telephone visits are billed at different rates depending on your insurance coverage. During this emergency period, for some insurers they may be billed the same as an in-person visit.  Please reach out to your insurance provider with any questions.    If during the course of the call the physician/provider feels a telephone visit is not appropriate, you will not be charged for this service.\"    Patient has given verbal consent for Telephone visit?  Yes    How would you like to obtain your AVS? Mail a copy    Subjective     Debi Mo is a 75 year old female who presents to clinic today for the following health issues:    HPI  New Patient/Transfer of Care  Diabetes Follow-up      How often are you checking your blood sugar? Not at all    What concerns do you have today about your diabetes? None     Do you have any of these symptoms? (Select all that apply)  Numbness in feet and Weight gain    Have you had a diabetic eye exam in the last 12 months? Yes-  Location: last visit in May of 2019. Has appointment coming up in May 2020    BP Readings from Last 2 Encounters:   01/16/20 93/65   12/16/19 124/70     Hemoglobin A1C (%)   Date Value   04/18/2020 7.8 (H)   09/11/2019 6.9 (H)     LDL Cholesterol Calculated (mg/dL)   Date Value   04/18/2020     Cannot estimate LDL when triglyceride exceeds 400 mg/dL "   04/19/2019 31     LDL Cholesterol Direct (mg/dL)   Date Value   04/18/2020 71       How many servings of fruits and vegetables do you eat daily?  2-3    On average, how many sweetened beverages do you drink each day (Examples: soda, juice, sweet tea, etc.  Do NOT count diet or artificially sweetened beverages)?   0    How many days per week do you exercise enough to make your heart beat faster? 3 or less    How many minutes a day do you exercise enough to make your heart beat faster? 9 or less  How many days per week do you miss taking your medication? Less then once a week     What makes it hard for you to take your medications?  remembering to take and not frequently      ----------------  Last Ov w Dr. Dietrich 10/2019  (E11.42) Type 2 diabetes mellitus with diabetic polyneuropathy, without long-term current use of insulin (H)  (primary encounter diagnosis)  -a1c was 6.9 in September, goal considering CAD is under 8  -currently on metformin for blood sugar control; discussed diet and exercise to help improve control   -also on asa, statin and ace  -eye exam UTD, foot exam today was normal  -recheck in 6 months   -encouraged patient to schedule podiatry appointment for toenail.    Plan: metFORMIN (GLUCOPHAGE) 500 MG tablet,         Hemoglobin A1c, Lipid panel reflex to direct         LDL Fasting, Comprehensive metabolic panel,         Albumin Random Urine Quantitative with Creat         Ratio     (I10) Benign essential hypertension  -bp at goal on lisinopril  -check renal function at next visit   Plan: lisinopril (PRINIVIL/ZESTRIL) 20 MG tablet,         Comprehensive metabolic panel       (I25.10) Coronary artery disease involving native coronary artery of native heart without angina pectoris  -currently asymptomatic, no history of MI; found on cath in 2012  -goal is risk factor reduction, on asa, statin  Plan: Lipid panel reflex to direct LDL Fasting     (Z87.442) History of nephrolithiasis  -followed by  urology  -on potassium to help prevent further stones     (E66.01) Morbid obesity due to excess calories (H)  -discussed importance of diet, exercise and weight loss      (E53.8) Vitamin B12 deficiency without anemia  Plan: Vitamin B12    ------------  Establish Care  # Social/covid 19  - lives by herself and it gets lonely  - one of her granddaughters ([facility]) has been staying with her on weekends  - excited to get outside and start gardening    # DM   Ref. Range 4/19/2019 09:53 9/11/2019 15:29 4/18/2020 09:59   Hemoglobin A1C Latest Ref Range: 0 - 5.6 % 7.0 (H) 6.9 (H) 7.8 (H)   - goal considering CAD is under 8  - currently on metformin 100mg BID  - also on asa, statin (simvastatin 20 mg) and ace (lisinopril 20 mg)  - wasn't too surprised that her A1c jumped... this winter felt like she turned into a slug. Would sit in a chair all day. Was more tired and would nap in the afternoon. Wasn't eating as well (chips, sweets). Feels like it took longer for her to recover from kidney stone surgery in Sept (usually bounces back in a few days).  - has also had some family issues (one of her grandchildren came out as transgender 2 years ago and had some mental health issues... she is one of the people her grandchild trusts) that has taken her mental energy  - thinks she needs to be more careful about what she buys from the grocery store   - eye exam scheduled May 2020    # HTN (no history prior to being diagnosed with DM)  BP Readings from Last 6 Encounters:   01/16/20 93/65   12/16/19 124/70   10/25/19 122/66   09/13/19 139/81   09/11/19 103/66   06/17/19 132/70   - lisinopril 20mg  - has a home wrist monitor  - no dizziness    # CAD  - heart problems showed up when she had a preop for kidney stone   - no angina  - statin, asa    # History of nephrolithiasis  - Follows by Urology (Dr. Orona), last OV 9/2019, plan for f/u in 1 year for a CT scan to evaluate for any new stones  - potassium citrate    #  Vitamin B12 deficiency without anemia   Ref. Range 4/18/2020 09:59   Vitamin B12 Latest Ref Range: 193 - 986 pg/mL 565   - continue supplementation    # Vitamin D def  - vitamin D 4000 international unit(s) daily  - recheck with next labs    # History of nephrolithiasis  - followed by urology  - on potassium citrate to help prevent further stones    # Diarrhea  - ever since she was started on potassium citrate  ----------------------------    Patient Active Problem List   Diagnosis     Seasonal allergic rhinitis     Advance Care Planning     Vitamin B12 deficiency without anemia     High triglycerides     History of nephrolithiasis     Serum calcium elevated     Hyperlipidemia LDL goal <70     Peripheral neuropathy     Health Care Home     LBBB (left bundle branch block)     Vitamin D deficiency     Coronary artery disease involving native coronary artery without angina pectoris     Morbid obesity due to excess calories (H)     Type 2 diabetes mellitus with diabetic polyneuropathy, without long-term current use of insulin (H)     Benign essential hypertension     Past Surgical History:   Procedure Laterality Date     CARDIAC SURGERY      ANGIOGRAM     CHOLECYSTECTOMY       COLONOSCOPY       COLONOSCOPY Left 1/16/2020    Procedure: COLONOSCOPY, WITH POLYPECTOMY AND BIOPSY polypectomy using jumbo cold bx forcep;  Surgeon: Katie Eric MD;  Location: [facility]     COMBINED CYSTOSCOPY, RETROGRADES, URETEROSCOPY, LASER HOLMIUM LITHOTRIPSY URETER(S), INSERT STENT Bilateral 9/28/2016    Procedure: COMBINED CYSTOSCOPY, RETROGRADES, URETEROSCOPY, LASER HOLMIUM LITHOTRIPSY URETER(S), INSERT STENT;  Surgeon: Lester Orona MD;  Location: [facility]     CYSTOSCOPY       CYSTOSCOPY, RETROGRADES, COMBINED  4/17/2013    Procedure: COMBINED CYSTOSCOPY, RETROGRADES;;  Surgeon: Lester Orona MD;  Location: [facility]     CYSTOSCOPY, URETEROSCOPY, COMBINED  11/12/2013    Procedure: COMBINED CYSTOSCOPY, URETEROSCOPY;   CYSTOSCOPY, LEFT URETEROSCOPY, LEFT EXTRACORPOREAL SHOCKWAVE LITHOTRIPSY  ;  Surgeon: Lester Orona MD;  Location: [facility]     EXTRACORPOREAL SHOCK WAVE LITHOTRIPSY (ESWL)  11/12/2013    Procedure: EXTRACORPOREAL SHOCK WAVE LITHOTRIPSY (ESWL);;  Surgeon: Lester Orona MD;  Location: [facility]     EXTRACORPOREAL SHOCK WAVE LITHOTRIPSY (ESWL) Bilateral 4/28/2015    Procedure: EXTRACORPOREAL SHOCK WAVE LITHOTRIPSY (ESWL);  Surgeon: Lester Orona MD;  Location: [facility]     EXTRACORPOREAL SHOCK WAVE LITHOTRIPSY, CYSTOSCOPY, INSERT STENT URETER(S), COMBINED  1/10/2012    Procedure:COMBINED EXTRACORPOREAL SHOCK WAVE LITHOTRIPSY, CYSTOSCOPY, INSERT STENT URETER(S); LEFT EXTRACORPOREAL SHOCKWAVE LITHOTRIPSY, LEFT STENT; Surgeon:LESTER ORONA; Location:[facility]     GALLBLADDER SURGERY  2009     HYSTERECTOMY VAGINAL  1993    left  the ovaries     LASER HOLMIUM LITHOTRIPSY URETER(S), INSERT STENT, COMBINED  4/17/2013    Procedure: COMBINED CYSTOSCOPY, URETEROSCOPY, LASER HOLMIUM LITHOTRIPSY URETER(S), INSERT STENT;  CYSTOSCOPY, Right URETEROSCOPY, LASER HOLMIUM LITHOTRIPSY URETER(S) standby only,Stone basketing, Right Retrogrades, ;  Surgeon: Lester Orona MD;  Location: [facility]     LASER HOLMIUM LITHOTRIPSY URETER(S), INSERT STENT, COMBINED Bilateral 9/13/2019    Procedure: Cystoscopy, bilateral retrograde pyelograms, interpretation of fluoroscopic images, bilateral ureteroscopy with holmium laser lithotripsy and stone basketing, placement of bilateral 5 x 24 double-J ureteral stents; 22 modifier for difficult and lengthy case;  Surgeon: Lester Orona MD;  Location: [facility]     SHOULDER SURGERY  2009    left rotator cuff       Social History     Tobacco Use     Smoking status: Never Smoker     Smokeless tobacco: Never Used   Substance Use Topics     Alcohol use: No     Family History   Problem Relation Age of Onset     Respiratory Mother         CHF     Alzheimer Disease Mother          Dementia     Heart Disease Father         Heart Attack     Respiratory Father         CHF     Breast Cancer Maternal Grandmother      Breast Cancer Paternal Grandmother      Arthritis Brother      Arthritis Sister      Hypertension Son      Diabetes Daughter      Arthritis Sister      Arthritis Sister      Colon Cancer No family hx of          Current Outpatient Medications   Medication Sig Dispense Refill     cyanocobalamin (VITAMIN B-12) 1000 MCG tablet Take 1 tablet (1,000 mcg) by mouth daily 100 tablet 3     loratadine (CLARITIN) 10 MG tablet Take 1 tablet (10 mg) by mouth daily 90 tablet 3     metFORMIN (GLUCOPHAGE) 500 MG tablet Take 2 tablets (1,000 mg) by mouth 2 times daily (with meals) 360 tablet 0     simvastatin (ZOCOR) 20 MG tablet Take 1 tablet (20 mg) by mouth At Bedtime 90 tablet 1     aspirin 81 MG tablet Take 81 mg by mouth daily       blood glucose monitoring (ONE TOUCH DELICA) lancets Use to test blood sugars 1 times daily or as directed. 100 each 11     blood glucose monitoring (ONE TOUCH VERIO IQ) test strip Use to test blood sugars 1 times daily or as directed. 50 each 11     blood glucose monitoring (ONETOUCH VERIO) meter device kit Use to test blood sugars 1 times daily or as directed. 1 kit 0     Cholecalciferol (VITAMIN D3 PO) Take 1,000 Units by mouth daily       Coenzyme Q10 (CO Q 10 PO) Take 100 mg by mouth daily        lisinopril (PRINIVIL/ZESTRIL) 20 MG tablet Take 1 tablet (20 mg) by mouth daily 90 tablet 3     potassium citrate-citric acid (CYTRA-K) 1100-334 MG/5ML solution TAKE 20ML BY MOUTH TWICE A DAY WITH MEALS. 1800 mL 1     Allergies   Allergen Reactions     Dust Mites      Seasonal Allergies        Reviewed and updated as needed this visit by Provider         Review of Systems   ROS COMP: Constitutional, HEENT, cardiovascular, pulmonary, gi and gu systems are negative, except as otherwise noted.       Objective   Reported vitals:  There were no vitals taken for this visit.    healthy, alert and no distress  PSYCH: Alert and oriented times 3; coherent speech, normal   rate and volume, able to articulate logical thoughts, able   to abstract reason, no tangential thoughts, no hallucinations   or delusions  Her affect is normal  RESP: No cough, no audible wheezing, able to talk in full sentences  Remainder of exam unable to be completed due to telephone visits    Diagnostic Test Results:  Labs reviewed in Epic      Assessment/Plan:  1. Encounter to establish care    2. Type 2 diabetes mellitus with diabetic polyneuropathy, without long-term current use of insulin (H)  A1c goal <8 given CAD  Increase likely 2/2 lack of activity, poor diet, etc. Pt not surprised by this and is motivated to make changes.   Will start with DM Education and f/u in 3 months. If no improvement in A1c would consider addition of second agent.  - Lipid panel reflex to direct LDL Fasting; Future  - AMBULATORY ADULT DIABETES EDUCATOR REFERRAL; Future  - metFORMIN (GLUCOPHAGE) 500 MG tablet; Take 2 tablets (1,000 mg) by mouth 2 times daily (with meals)  Dispense: 360 tablet; Refill: 0  - **A1C FUTURE anytime; Future    3. High triglycerides  4. Hyperlipidemia LDL goal <70  Triglycerides always high but higher than usual with poor diet and weight gain.   Discussed diet/exericse as above and will recheck in 3 months.   If triglycerides still at this level consider changing to atorvastatin for triglyceride lowering effect (LDL has historically been okay on moderate dose statin).  - Lipid panel reflex to direct LDL Fasting; Future  - simvastatin (ZOCOR) 20 MG tablet; Take 1 tablet (20 mg) by mouth At Bedtime  Dispense: 90 tablet; Refill: 1    5. History of nephrolithiasis  - Follows by Urology (Dr. Orona), last OV 9/2019, plan for f/u in 1 year for a CT scan to evaluate for any new stones  - potassium citrate has led to diarrhea (see below)    6. Diarrhea, unspecified type  Likely medication induced as it started with  addition of potassium citrate. It is limiting her activity as she can go upwards of 7x a day and is afraid to leave her house.   I encouraged her to call Urology and see if there are other options.   If there are not other options would have her meet with MTM to review meds - remove ones with diarrhea as side effect (metformin) and address her medical conditions differently.    7. Vitamin B12 deficiency without anemia   Ref. Range 4/18/2020 09:59   Vitamin B12 Latest Ref Range: 193 - 986 pg/mL 565   - continue supplementation  - cyanocobalamin (VITAMIN B-12) 1000 MCG tablet; Take 1 tablet (1,000 mcg) by mouth daily  Dispense: 100 tablet; Refill: 3    8. Chronic seasonal allergic rhinitis due to pollen  - loratadine (CLARITIN) 10 MG tablet; Take 1 tablet (10 mg) by mouth daily  Dispense: 90 tablet; Refill: 3    9. Vitamin D deficiency  - Vitamin D Deficiency; Future    10. Encounter for screening mammogram for breast cancer  - *MA Screening Digital Bilateral; Future     # HCM   - due for eye exam (scheduled)  - mammo due 4/2020  - utd on colon cancer screening (2030)    Return in about 16 weeks (around 8/13/2020) for Wellness Visit, Diabetes.     Urology - call  Lab  DM ED    Phone call duration:  24 minutes  [phone]    Grant Brown MD

## 2020-04-30 ENCOUNTER — TELEPHONE (OUTPATIENT)
Dept: PEDIATRICS | Facility: CLINIC | Age: 75
End: 2020-04-30

## 2020-04-30 DIAGNOSIS — E55.9 VITAMIN D DEFICIENCY: ICD-10-CM

## 2020-04-30 DIAGNOSIS — E83.52 HYPERCALCEMIA: ICD-10-CM

## 2020-04-30 DIAGNOSIS — E78.1 HIGH TRIGLYCERIDES: ICD-10-CM

## 2020-04-30 DIAGNOSIS — E11.42 TYPE 2 DIABETES MELLITUS WITH DIABETIC POLYNEUROPATHY, WITHOUT LONG-TERM CURRENT USE OF INSULIN (H): ICD-10-CM

## 2020-04-30 LAB — HBA1C MFR BLD: NORMAL % (ref 0–5.6)

## 2020-04-30 PROCEDURE — 36415 COLL VENOUS BLD VENIPUNCTURE: CPT | Performed by: INTERNAL MEDICINE

## 2020-04-30 PROCEDURE — 82310 ASSAY OF CALCIUM: CPT | Performed by: INTERNAL MEDICINE

## 2020-04-30 NOTE — TELEPHONE ENCOUNTER
Diabetes Education Scheduling Outreach #1:    Call to patient to schedule. Left message with phone number to call to schedule.    Plan for 2nd outreach attempt within 1 week.    Mary Benavidez  [facility]  Diabetes and Nutrition Scheduling

## 2020-05-01 LAB — CALCIUM SERPL-MCNC: 10.3 MG/DL (ref 8.5–10.1)

## 2020-05-04 DIAGNOSIS — N20.0 CALCULUS OF KIDNEY: Primary | ICD-10-CM

## 2020-05-04 RX ORDER — POTASSIUM CITRATE 10 MEQ/1
10 TABLET, EXTENDED RELEASE ORAL
Qty: 90 TABLET | Refills: 11 | Status: SHIPPED | OUTPATIENT
Start: 2020-05-04 | End: 2021-04-26

## 2020-05-22 ENCOUNTER — TRANSFERRED RECORDS (OUTPATIENT)
Dept: HEALTH INFORMATION MANAGEMENT | Facility: CLINIC | Age: 75
End: 2020-05-22

## 2020-05-22 LAB — RETINOPATHY: NEGATIVE

## 2020-05-26 DIAGNOSIS — E83.52 HYPERCALCEMIA: ICD-10-CM

## 2020-05-26 LAB — PTH-INTACT SERPL-MCNC: 51 PG/ML (ref 18–80)

## 2020-05-26 PROCEDURE — 82310 ASSAY OF CALCIUM: CPT | Performed by: INTERNAL MEDICINE

## 2020-05-26 PROCEDURE — 82542 COL CHROMOTOGRAPHY QUAL/QUAN: CPT | Mod: 90 | Performed by: INTERNAL MEDICINE

## 2020-05-26 PROCEDURE — 99000 SPECIMEN HANDLING OFFICE-LAB: CPT | Performed by: INTERNAL MEDICINE

## 2020-05-26 PROCEDURE — 83970 ASSAY OF PARATHORMONE: CPT | Performed by: INTERNAL MEDICINE

## 2020-05-26 PROCEDURE — 36415 COLL VENOUS BLD VENIPUNCTURE: CPT | Performed by: INTERNAL MEDICINE

## 2020-05-26 PROCEDURE — 82306 VITAMIN D 25 HYDROXY: CPT | Performed by: INTERNAL MEDICINE

## 2020-05-27 LAB
CALCIUM SERPL-MCNC: 10 MG/DL (ref 8.5–10.1)
DEPRECATED CALCIDIOL+CALCIFEROL SERPL-MC: 47 UG/L (ref 20–75)

## 2020-05-29 NOTE — TELEPHONE ENCOUNTER
Diabetes Education Scheduling Outreach #2:    Call to patient to schedule. Left message with phone number to call to schedule.    Mary Benavidez  [facility]  Diabetes and Nutrition Scheduling

## 2020-05-30 LAB — PTH RELATED PROT SERPL-SCNC: <2 PMOL/L (ref 0–3.4)

## 2020-06-01 NOTE — TELEPHONE ENCOUNTER
Please postpone for a week and then try connecting Berna with DM Education again.     Thank you!  LEELEE Brown MD  Internal Medicine-Pediatrics

## 2020-06-18 ENCOUNTER — ALLIED HEALTH/NURSE VISIT (OUTPATIENT)
Dept: EDUCATION SERVICES | Facility: CLINIC | Age: 75
End: 2020-06-18
Payer: COMMERCIAL

## 2020-06-18 DIAGNOSIS — E11.42 TYPE 2 DIABETES MELLITUS WITH DIABETIC POLYNEUROPATHY, WITHOUT LONG-TERM CURRENT USE OF INSULIN (H): Primary | ICD-10-CM

## 2020-06-18 PROCEDURE — G0108 DIAB MANAGE TRN  PER INDIV: HCPCS | Mod: 95

## 2020-06-18 NOTE — PROGRESS NOTES
"Diabetes Self-Management Education & Support    Patient verbally consented to the telephone visit service today: yes    Audio only visit done, as patient does not have access to audio-visual technology.    Individual visit provided, given no group classes are available for 2 months.       Presents for: Individual review via Telephone Visit    SUBJECTIVE/OBJECTIVE:  Presents for: Individual review  Accompanied by: Self  Diabetes education in the past 24mo: No  Focus of Visit: Patient Unsure  Diabetes type: Type 2  Disease course: Worsening  How confident are you filling out medical forms by yourself:: Quite a bit  Diabetes management related comments/concerns: A1c went up in April; doesn't enjoy cooking, easier to do prepackaged and canned foods; sleep schedule is \"out of whack\"  Difficulty affording diabetes medication?: No  Difficulty affording diabetes testing supplies?: No  Other concerns:: None  Cultural Influences/Ethnic Background:  American      Diabetes Symptoms & Complications:  Fatigue: Yes  Polydipsia: No  Polyphagia: No    Patient Problem List and Family Medical History reviewed for relevant medical history, current medical status, and diabetes risk factors.    Vitals:  There were no vitals taken for this visit.  Estimated body mass index is 36.05 kg/m  as calculated from the following:    Height as of 1/16/20: 1.626 m (5' 4\").    Weight as of 1/16/20: 95.3 kg (210 lb).   Last 3 BP:   BP Readings from Last 3 Encounters:   01/16/20 93/65   12/16/19 124/70   10/25/19 122/66       History   Smoking Status     Never Smoker   Smokeless Tobacco     Never Used       Labs:  Lab Results   Component Value Date    A1C Canceled, Test credited 04/30/2020     Lab Results   Component Value Date     04/18/2020     Lab Results   Component Value Date    LDL  04/18/2020     Cannot estimate LDL when triglyceride exceeds 400 mg/dL    LDL 71 04/18/2020     HDL Cholesterol   Date Value Ref Range Status   04/18/2020 35 " (L) >49 mg/dL Final   ]  GFR Estimate   Date Value Ref Range Status   04/18/2020 59 (L) >60 mL/min/[1.73_m2] Final     Comment:     Non  GFR Calc  Starting 12/18/2018, serum creatinine based estimated GFR (eGFR) will be   calculated using the Chronic Kidney Disease Epidemiology Collaboration   (CKD-EPI) equation.       GFR Estimate If Black   Date Value Ref Range Status   04/18/2020 69 >60 mL/min/[1.73_m2] Final     Comment:      GFR Calc  Starting 12/18/2018, serum creatinine based estimated GFR (eGFR) will be   calculated using the Chronic Kidney Disease Epidemiology Collaboration   (CKD-EPI) equation.       Lab Results   Component Value Date    CR 0.94 04/18/2020     No results found for: MICROALBUMIN    Healthy Eating:  Meals include: Breakfast, Lunch, Dinner, Evening Snack  Breakfast: toast sometimes with dunham OR cereal OR rarely an egg OR leftovers OR yogurt with fruit OR rarely pancakes, waffles, sausage  Lunch: sometimes biggest meal of the day -- baked tilpia (6 oz) with southwest sauce + baked potato + sauteed onion, mushroom, olives OR [employer] - 2 tacos and 1/2 potato griller and cinnamon twists  Dinner: 1/2 can of chili with wheat thins OR tuna burgers OR pizza with granddaughters OR hamburger and veggies and pasta salad and cake w/ ice cream at a birthday party  Snacks: watermelon OR popcorn  Beverages: Water, Other(zero calorie ICE drinks)    Being Active:  Exercise:: Currently not exercising  Barrier to exercise: Physical limitation(ingrown toenail and on potassium citrate which caused diarrhea, now switched to a different type and less diarrhea)    Monitoring:  Did patient bring glucose meter to appointment? : No  Times checking blood sugar at home (number): Never    Taking Medications:  Diabetes Medication(s)     Biguanides       metFORMIN (GLUCOPHAGE) 500 MG tablet    Take 2 tablets (1,000 mg) by mouth 2 times daily (with meals)          Current Treatments: Oral  Medication (taken by mouth)  Problems taking diabetes medications regularly?: No  Diabetes medication side effects?: No    Problem Solving:  Is the patient at risk for hypoglycemia?: No  Does patient have severe weather/disaster plan for diabetes management?: Not Needed    Reducing Risks:  Diabetes Risks: Age over 45 years, Sedentary Lifestyle  CAD Risks: Diabetes Mellitus, Obesity, Sedentary lifestyle, Post-menopausal  Feet checked by healthcare provider in the last year?: Yes    Healthy Coping:  Emotional response to diabetes: Ready to learn, Concern for health and well-being, Acceptance  Stage of change: PREPARATION (Decided to change - considering how)  Patient Activation Measure Survey Score:  ADRIÁN Score (Last Two) 8/22/2013 10/1/2013   ADRIÁN Raw Score 35 36   Activation Score 45.2 47.4   ADRIÁN Level 1 2       Diabetes knowledge and skills assessment:   Patient is knowledgeable in diabetes management concepts related to: Taking Medication    Patient needs further education on the following diabetes management concepts: Healthy Eating, Being Active and Problem Solving    Based on learning assessment above, most appropriate setting for further diabetes education would be: Individual setting.      INTERVENTIONS:    Education provided today on:  AADE Self-Care Behaviors:  Healthy Eating: consistency in amount, composition, and timing of food intake and portion control  Being Active: relationship to blood glucose and describe appropriate activity program  Healthy Coping: benefits of making appropriate lifestyle changes and utilize support systems    Opportunities for ongoing education and support in diabetes-self management were discussed.    Pt verbalized understanding of concepts discussed and recommendations provided today.       Education Materials Provided:  [facility] Understanding Diabetes Booklet and My Plate Planner - will be mailed to patient      ASSESSMENT:  A1c has been increasing over the last year, patient  Lloyd Boyer(Attending) relates to less exercise due to physical limitations and eating more sweets and snacks than before. Her diet is generally balanced, though would benefit from paying closer attention to portion control. She is not monitoring glucose at home. Reviewed healthy eating and balanced diet with attention to portion control. Also encouraged increasing physical activity, as able.      Patient's most recent   Lab Results   Component Value Date    A1C 7.8 04/18/2020    is meeting goal of <8.0    PLAN  See Patient Instructions for co-developed, patient-stated behavior change goals.  AVS printed and provided to patient today. See Follow-Up section for recommended follow-up.  Follow-up yearly for diabetes education annual update.    Chinyere Beatty, MPH, RDN, LD, CDCES   Time Spent: 31 minutes (3:06 - 3:37 pm)  Encounter Type: Individual Telephone Visit

## 2020-06-18 NOTE — PATIENT INSTRUCTIONS
Watch portion sizes    Add small activities to each day    Follow-up yearly for diabetes education annual update    Call/ MyChart with any questions

## 2020-07-22 ENCOUNTER — OFFICE VISIT (OUTPATIENT)
Dept: PEDIATRICS | Facility: CLINIC | Age: 75
End: 2020-07-22
Payer: COMMERCIAL

## 2020-07-22 VITALS
DIASTOLIC BLOOD PRESSURE: 76 MMHG | SYSTOLIC BLOOD PRESSURE: 122 MMHG | RESPIRATION RATE: 18 BRPM | TEMPERATURE: 97.6 F | WEIGHT: 212.6 LBS | OXYGEN SATURATION: 96 % | BODY MASS INDEX: 36.29 KG/M2 | HEART RATE: 86 BPM | HEIGHT: 64 IN

## 2020-07-22 DIAGNOSIS — Z01.818 PREOP GENERAL PHYSICAL EXAM: Primary | ICD-10-CM

## 2020-07-22 DIAGNOSIS — I20.89 STABLE ANGINA (H): ICD-10-CM

## 2020-07-22 DIAGNOSIS — H02.403 EYELID DROOPING DISEASE, BILATERAL: ICD-10-CM

## 2020-07-22 DIAGNOSIS — E11.42 TYPE 2 DIABETES MELLITUS WITH DIABETIC POLYNEUROPATHY, WITHOUT LONG-TERM CURRENT USE OF INSULIN (H): ICD-10-CM

## 2020-07-22 PROCEDURE — 99214 OFFICE O/P EST MOD 30 MIN: CPT | Mod: GC | Performed by: STUDENT IN AN ORGANIZED HEALTH CARE EDUCATION/TRAINING PROGRAM

## 2020-07-22 ASSESSMENT — MIFFLIN-ST. JEOR: SCORE: 1444.35

## 2020-07-22 NOTE — PROGRESS NOTES
"[facility]  [address]  [address]  [place] [address]  [phone]  Dept: [phone]    PRE-OP EVALUATION:  Today's date: 2020    Debi Mo (: 1945) presents for pre-operative evaluation assessment as requested by Dr. Smith.  She requires evaluation and anesthesia risk assessment prior to undergoing surgery/procedure for treatment of eyelid lift  .    Proposed Surgery/ Procedure: eyelid lift  Date of Surgery/ Procedure: 20  Time of Surgery/ Procedure: 11:00 AM  Hospital/Surgical Facility: [facility]   Surgery Fax Number: [phone]  Primary Physician: Grant Brown  Type of Anesthesia Anticipated: to be determined    Preoperative Questionnaire:   Unsure - Have you ever had a heart attack or stroke?  No - Have you ever had surgery on your heart or blood vessels, such as a stent, coronary (heart) bypass, or surgery on an artery in the head, neck, heart, or legs?  Yes - Do you have chest pain when you are physically active? When climbing stairs carrying a load of laundry; no chest pain while walking on flat ground. Describes as tightness, heavy sensation in central chest, no radiation of pain to either arm, jaw, or back. Associated w/ some mild shortness of breath, No chest pain at rest. CP resolves immediately once she stops exerting herself. No nausea, diaphoresis, palpitations, syncope, leg swelling.  No - Do you have a history of heart failure?  No - Do you currently have a cold, bronchitis, or symptoms of other respiratory (head and chest) infections?  Yes - Do you have a cough, shortness of breath, or wheezing? \"It's always there\" - cough. Worse in spring with runny nose, has seasonal allergies. Takes loratadine.  Yes - Do you or anyone in your family have a history of blood clots? Sister had a DVT.  No - Do you or anyone in your family have a serious bleeding problem, such as long-lasting bleeding after surgeries or " cuts?  Yes - Have you ever had anemia or been told to take iron pills? Prior to hysterectomy (1993) due to heavy uterine bleeding; no issues with anemia post-hysterectomy  Yes - Have you had any abnormal blood loss such as black, tarry or bloody stools, or abnormal vaginal bleeding? S/p hysterectomy; see above  No - Have you ever had a blood transfusion?  Yes - Are you willing to have a blood transfusion if it is medically needed before, during, or after your surgery?  Yes - Have you or anyone in your family ever had problems with anesthesia (sedation for surgery)? Personal history of N/V with anesthesia, resolves w/ anti-emetics  No - Do you have sleep apnea, excessive snoring, or daytime drowsiness?   No - Do you have any artifical heart valves or other implanted medical devices, such as a pacemaker, defibrillator, or continuous glucose monitor?  No - Do you have any artifical joints?  No - Are you allergic to latex?  No - Is there any chance that you may be pregnant?    Patient has a Health Care Directive or Living Will:  YES     HPI:     HPI related to upcoming procedure: Had had ongoing eyelid drooping, now affecting her vision. Saw ophthalmology a few months ago and complained of her vision worsening, so was advised to have this procedure (eyelid lift) done to improve her vision.    Has stopped aspirin 10 days pre-operatively per recommendation from surgeon.  Advised to hold metformin on day of surgery.    DIABETES - Patient has a longstanding history of DiabetesType Type II . Patient is being treated with oral agents and ASA and denies significant side effects. Control has been fair. Complicating factors include but are not limited to: hypertension, hyperlipidemia, neuropathy and morbid obesity .     MEDICAL HISTORY:     Patient Active Problem List    Diagnosis Date Noted     Benign essential hypertension 04/24/2018     Priority: High     Hyperlipidemia LDL goal <70 03/14/2013     Priority: High     Morbid  obesity due to excess calories (H) 07/13/2017     Priority: Medium     Type 2 diabetes mellitus with diabetic polyneuropathy, without long-term current use of insulin (H) 07/13/2017     Priority: Medium     Coronary artery disease involving native coronary artery without angina pectoris 10/18/2015     Priority: Medium     1/2012- cath with diffuse disease, no intervention, anterior wall motion abnormality       History of nephrolithiasis 03/14/2013     Priority: Medium     Vitamin B12 deficiency without anemia 11/17/2011     Priority: Medium     Diagnosis updated by automated process. Provider to review and confirm.       Vitamin D deficiency 06/18/2013     Priority: Low     Problem list name updated by automated process. Provider to review and confirm  [employer] Update utility       LBBB (left bundle branch block) 04/03/2013     Priority: Low     Health Care Home 04/01/2013     Priority: Low     Penny Britton RN-BSN, [facility]  [phone].  FPA / FMG are for Seniors      DX V65.8 REPLACED WITH 96734 HEALTH CARE HOME (04/08/2013)       Serum calcium elevated 03/14/2013     Priority: Low     Peripheral neuropathy 03/14/2013     Priority: Low     High triglycerides 01/03/2012     Priority: Low     Advance Care Planning 10/17/2011     Priority: Low     Advance Care Planning 12/6/2016: Receipt of ACP document:  Received: Health Care Directive which was witnessed or notarized on 7/28/03.  Document previously scanned on 10/2/16.  Validation form previously completed and sent to be scanned.  Code Status reflects choices in most recent ACP document.  Confirmed/documented designated decision maker(s).  Added by Nataly Riggins RN, Advance Care Planning Liaison.  Advance Care Planning 5/5/15: Advance Care Planning invitation sent. VERONA Palmer RN  Advance Care Planning 10/17/2011: Patient states has Advance Directive and will bring in a copy to clinic.          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 possible silent MI or may be developing  cardiomyopathy     Hyperlipidemia LDL goal <100 2/11/2011     Hypertension      Kidney stone 2/11/2011    3 episodes (1993,2012 and presently)     Microalbuminuria 10/15/2014     Mumps      Numbness and tingling     diab neuropathy feet     Palpitations     upon exertion     PONV (postoperative nausea and vomiting)     1993     Reflux      Renal cyst, left 2/11/2011     Snores      Tuberculosis     as a child - negative now     Type 2 diabetes, HbA1c goal < 7% (H) 2/11/2011     Past Surgical History:   Procedure Laterality Date     CARDIAC SURGERY      ANGIOGRAM     CHOLECYSTECTOMY       COLONOSCOPY       COLONOSCOPY Left 1/16/2020    Procedure: COLONOSCOPY, WITH POLYPECTOMY AND BIOPSY polypectomy using jumbo cold bx forcep;  Surgeon: Katie Eric MD;  Location:  GI     COMBINED CYSTOSCOPY, RETROGRADES, URETEROSCOPY, LASER HOLMIUM LITHOTRIPSY URETER(S), INSERT STENT Bilateral 9/28/2016    Procedure: COMBINED CYSTOSCOPY, RETROGRADES, URETEROSCOPY, LASER HOLMIUM LITHOTRIPSY URETER(S), INSERT STENT;  Surgeon: Lester Orona MD;  Location:  OR     CYSTOSCOPY       CYSTOSCOPY, RETROGRADES, COMBINED  4/17/2013    Procedure: COMBINED CYSTOSCOPY, RETROGRADES;;  Surgeon: Lester Orona MD;  Location:  OR     CYSTOSCOPY, URETEROSCOPY, COMBINED  11/12/2013    Procedure: COMBINED CYSTOSCOPY, URETEROSCOPY;  CYSTOSCOPY, LEFT URETEROSCOPY, LEFT EXTRACORPOREAL SHOCKWAVE LITHOTRIPSY  ;  Surgeon: Lester Orona MD;  Location:  OR     EXTRACORPOREAL SHOCK WAVE LITHOTRIPSY (ESWL)   11/12/2013    Procedure: EXTRACORPOREAL SHOCK WAVE LITHOTRIPSY (ESWL);;  Surgeon: Lester Orona MD;  Location: [facility] OR     EXTRACORPOREAL SHOCK WAVE LITHOTRIPSY (ESWL) Bilateral 4/28/2015    Procedure: EXTRACORPOREAL SHOCK WAVE LITHOTRIPSY (ESWL);  Surgeon: Lester Orona MD;  Location: [facility] OR     EXTRACORPOREAL SHOCK WAVE LITHOTRIPSY, CYSTOSCOPY, INSERT STENT URETER(S), COMBINED  1/10/2012    Procedure:COMBINED EXTRACORPOREAL SHOCK WAVE LITHOTRIPSY, CYSTOSCOPY, INSERT STENT URETER(S); LEFT EXTRACORPOREAL SHOCKWAVE LITHOTRIPSY, LEFT STENT; Surgeon:LESTER ORONA; Location:[facility] OR     GALLBLADDER SURGERY  2009     HYSTERECTOMY VAGINAL  1993    left  the ovaries     LASER HOLMIUM LITHOTRIPSY URETER(S), INSERT STENT, COMBINED  4/17/2013    Procedure: COMBINED CYSTOSCOPY, URETEROSCOPY, LASER HOLMIUM LITHOTRIPSY URETER(S), INSERT STENT;  CYSTOSCOPY, Right URETEROSCOPY, LASER HOLMIUM LITHOTRIPSY URETER(S) standby only,Stone basketing, Right Retrogrades, ;  Surgeon: Lester Orona MD;  Location: [facility] OR     LASER HOLMIUM LITHOTRIPSY URETER(S), INSERT STENT, COMBINED Bilateral 9/13/2019    Procedure: Cystoscopy, bilateral retrograde pyelograms, interpretation of fluoroscopic images, bilateral ureteroscopy with holmium laser lithotripsy and stone basketing, placement of bilateral 5 x 24 double-J ureteral stents; 22 modifier for difficult and lengthy case;  Surgeon: Lester Orona MD;  Location: [facility] OR     SHOULDER SURGERY  2009    left rotator cuff     Current Outpatient Medications   Medication Sig Dispense Refill     aspirin 81 MG tablet Take 81 mg by mouth daily       blood glucose monitoring (ONE TOUCH DELICA) lancets Use to test blood sugars 1 times daily or as directed. 100 each 11     blood glucose monitoring (ONE TOUCH VERIO IQ) test strip Use to test blood sugars 1 times daily or as directed. 50 each 11     blood glucose monitoring (ONETOUCH VERIO) meter device kit Use to test  "blood sugars 1 times daily or as directed. 1 kit 0     Cholecalciferol (VITAMIN D3 PO) Take 1,000 Units by mouth daily       Coenzyme Q10 (CO Q 10 PO) Take 100 mg by mouth daily        cyanocobalamin (VITAMIN B-12) 1000 MCG tablet Take 1 tablet (1,000 mcg) by mouth daily 100 tablet 3     lisinopril (PRINIVIL/ZESTRIL) 20 MG tablet Take 1 tablet (20 mg) by mouth daily 90 tablet 3     loratadine (CLARITIN) 10 MG tablet Take 1 tablet (10 mg) by mouth daily 90 tablet 3     metFORMIN (GLUCOPHAGE) 500 MG tablet Take 2 tablets (1,000 mg) by mouth 2 times daily (with meals) 360 tablet 0     potassium citrate (UROCIT-K) 10 MEQ (1080 MG) CR tablet Take 1 tablet (10 mEq) by mouth 3 times daily (with meals) 90 tablet 11     potassium citrate-citric acid (CYTRA-K) 1100-334 MG/5ML solution TAKE 20ML BY MOUTH TWICE A DAY WITH MEALS. 1800 mL 1     simvastatin (ZOCOR) 20 MG tablet Take 1 tablet (20 mg) by mouth At Bedtime 90 tablet 1     OTC products: None    Allergies   Allergen Reactions     Dust Mites      Seasonal Allergies       Latex Allergy: NO    Social History     Tobacco Use     Smoking status: Never Smoker     Smokeless tobacco: Never Used   Substance Use Topics     Alcohol use: No     History   Drug Use No     REVIEW OF SYSTEMS:   CONSTITUTIONAL: NEGATIVE for fever, chills, change in weight  ENT/MOUTH: NEGATIVE for ear, mouth and throat problems  RESP: NEGATIVE for significant cough or SOB  CV: NEGATIVE for chest pain, palpitations or peripheral edema  GI: NEGATIVE for nausea, abdominal pain, heartburn, or change in bowel habits    EXAM:   /76 (BP Location: Right arm, Patient Position: Sitting, Cuff Size: Adult Large)   Pulse 86   Temp 97.6  F (36.4  C) (Tympanic)   Resp 18   Ht 1.626 m (5' 4\")   Wt 96.4 kg (212 lb 9.6 oz)   SpO2 96%   BMI 36.49 kg/m    GENERAL APPEARANCE: healthy, alert and no distress  HENT: ear canals and TM's normal and nose and mouth without ulcers or lesions  RESP: lungs clear to " "auscultation - no rales, rhonchi or wheezes  CV: regular rate and rhythm, normal S1 S2, no S3 or S4 and no murmur, click or rub   ABDOMEN: soft, nontender, no HSM or masses and bowel sounds normal  NEURO: Normal strength and tone, sensory exam grossly normal, mentation intact and speech normal    DIAGNOSTICS:   No labs or EKG required for low risk surgery (cataract, skin procedure, breast biopsy, etc)  - Last EKG 9/2019 - sinus rhythm w/ occasional PACs    Recent Labs   Lab Test 04/30/20  1141 04/18/20  0959  06/08/19  0546  02/19/16  0805   HGB  --   --   --  12.5  --  13.5   PLT  --   --   --  196  --  237   NA  --  134  --  137   < > 136   POTASSIUM  --  4.4  --  4.2   < > 4.5   CR  --  0.94  --  0.92   < > 1.27*   A1C Canceled, Test credited 7.8*   < >  --    < >  --     < > = values in this interval not displayed.      IMPRESSION:   Reason for surgery/procedure: Eyelid dropping negatively affecting vision  Diagnosis/reason for consult: Pre-op physical exam    The proposed surgical procedure is considered LOW risk.    REVISED CARDIAC RISK INDEX  The patient has the following serious cardiovascular risks for perioperative complications such as (MI, PE, VFib and 3  AV Block):  Coronary Artery Disease (MI,  ?positive stress test, angina, Qs on EKG) - questionable, \"false-positive\" stress test in 2017; currently having chest pain that sounds consistent with stable angina.  INTERPRETATION: 1 risks: Class II (low risk - 0.9% complication rate)    The patient has the following additional risks for perioperative complications:  No identified additional risks      ICD-10-CM    1. Preop general physical exam  Z01.818    2. Eyelid drooping disease, bilateral  H02.403    3. Type 2 diabetes mellitus with diabetic polyneuropathy, without long-term current use of insulin (H)  E11.42    4. Stable angina (H)  I20.8      RECOMMENDATIONS:     --Consult hospital rounder / IM to assist post-op medical management    --Patient is to " take all scheduled medications on the day of surgery EXCEPT for modifications listed below.    Diabetes Medication Use  -----Hold usual oral and non-insulin diabetic meds (e.g. Metformin, Actos, Glipizide) while NPO.     Anticoagulant or Antiplatelet Medication Use  ASPIRIN: Discontinue ASA 7-10 days prior to procedure to reduce bleeding risk.  It should be resumed post-operatively.      APPROVAL GIVEN to proceed with proposed procedure, without further diagnostic evaluation     Signed Electronically by: Aisha Gallagher MD    Copy of this evaluation report is provided to requesting physician.    [place] Preop Guidelines    Revised Cardiac Risk Index    ---------------------    I personally saw this patient and discussed this case in depth with Dr. Gallagher. I reviewed and agree with the key components of the history, physical, assessment, and plan. Okay for eye surgery however I will connect with Cardiology regarding what sounds like stable angina. She had a false positive stress test in the past so will connect with her Cardiologist about the best next step.      LEELEE Brown MD  Internal Medicine-Pediatrics

## 2020-07-26 ENCOUNTER — TELEPHONE (OUTPATIENT)
Dept: PEDIATRICS | Facility: CLINIC | Age: 75
End: 2020-07-26

## 2020-07-26 DIAGNOSIS — I20.89 STABLE ANGINA (H): Primary | ICD-10-CM

## 2020-07-27 RX ORDER — METOPROLOL TARTRATE 50 MG
TABLET ORAL
Qty: 2 TABLET | Refills: 0 | Status: SHIPPED | OUTPATIENT
Start: 2020-07-27 | End: 2020-11-09

## 2020-07-27 NOTE — TELEPHONE ENCOUNTER
Please call pt.     I spoke to her Cardiologist about the chest pain she described to Dr. Gallagher and I. It sounds like it's less likely to be cardiac but he would recommend another CTA. Is she okay with doing this? If so please route back to me and I can order.     LEELEE Brown MD  Internal Medicine-Pediatrics

## 2020-07-27 NOTE — TELEPHONE ENCOUNTER
Called patient and reviewed message. Patient agreeable to repeat CTA. Will route back to PCP to order.

## 2020-07-28 NOTE — TELEPHONE ENCOUNTER
Ordered - please let pt know she should hear from Radiology to schedule. I also sent in a script for metoprolol for her to take the night beofre and the morning of the scan. This helps slow down her HR and get a better picture.     If it looks like things have changed I'll have her see Cardiology again.    LEELEE Brown MD  Internal Medicine-Pediatrics

## 2020-07-28 NOTE — TELEPHONE ENCOUNTER
Called patient, reviewed message from provider. Patient verbalized understanding.    Patient is still recovering from yesterday's procedure, does not wish to schedule during call.      Advised pt to call the clinic if by the end of the week she has not heard from radiology. Patient verbalized understanding and in agreement with plan.

## 2020-08-03 ENCOUNTER — DOCUMENTATION ONLY (OUTPATIENT)
Dept: PEDIATRICS | Facility: CLINIC | Age: 75
End: 2020-08-03

## 2020-08-03 DIAGNOSIS — E11.42 TYPE 2 DIABETES MELLITUS WITH DIABETIC POLYNEUROPATHY, WITHOUT LONG-TERM CURRENT USE OF INSULIN (H): Primary | ICD-10-CM

## 2020-08-03 NOTE — PROGRESS NOTES
Berna has a lab only appt 8/6 and no open orders. Please place any future orders needed.    Thanks lab

## 2020-08-04 ENCOUNTER — TELEPHONE (OUTPATIENT)
Dept: PEDIATRICS | Facility: CLINIC | Age: 75
End: 2020-08-04

## 2020-08-04 NOTE — TELEPHONE ENCOUNTER
Panel Management Review          Summary:    Patient is due/failing the following:   AWV    Action needed:   Needs above     Type of outreach:    non- already scheduled     Questions for provider review:    None                                                                                                                                    Joyce Ortiz MA// August 4, 2020 4:19 PM

## 2020-08-06 DIAGNOSIS — E11.42 TYPE 2 DIABETES MELLITUS WITH DIABETIC POLYNEUROPATHY, WITHOUT LONG-TERM CURRENT USE OF INSULIN (H): ICD-10-CM

## 2020-08-06 LAB — HBA1C MFR BLD: 7.6 % (ref 0–5.6)

## 2020-08-06 PROCEDURE — 83036 HEMOGLOBIN GLYCOSYLATED A1C: CPT | Performed by: INTERNAL MEDICINE

## 2020-08-06 PROCEDURE — 36415 COLL VENOUS BLD VENIPUNCTURE: CPT | Performed by: INTERNAL MEDICINE

## 2020-08-06 NOTE — PROGRESS NOTES
"SUBJECTIVE:   Debi Mo is a 75 year old female who presents for Preventive Visit.    Are you in the first 12 months of your Medicare coverage?  No    Healthy Habits:    In general, how would you rate your overall health?  Very good    Frequency of exercise:  None    Duration of exercise:  Less than 15 minutes    Do you usually eat at least 4 servings of fruit and vegetables a day, include whole grains    & fiber and avoid regularly eating high fat or \"junk\" foods?  No (2 helpings )    Taking medications regularly:  No    Barriers to taking medications:  None    Medication side effects:  Other (loose stools, dizzness )    Ability to successfully perform activities of daily living:  No assistance needed    Home Safety:  No safety concerns identified    Hearing Impairment:  Difficulty following a conversation in a noisy restaurant or crowded room and need to ask people to speak up or repeat themselves    In the past 6 months, have you been bothered by leaking of urine? Yes    In general, how would you rate your overall mental or emotional health?  Very good      PHQ-2 Total Score:    Additional concerns today:  Yes (A1c. )     Do you feel safe in your environment? Yes    Have you ever done Advance Care Planning? (For example, a Health Directive, POLST, or a discussion with a medical provider or your loved ones about your wishes): Yes, advance care planning is on file.    Fall risk  Fallen 2 or more times in the past year?: Yes(Twice.)  Any fall with injury in the past year?: No    Cognitive Screening   1) Repeat 3 items (Leader, Season, Table)    2) Clock draw: NORMAL  3) 3 item recall: Recalls 2 objects   Results: NORMAL clock, 1-2 items recalled: COGNITIVE IMPAIRMENT LESS LIKELY    Mini-CogTM Copyright LUIS A Spencer. Licensed by the author for use in [place] [employer]; reprinted with permission (jonathon@.[facility]). All rights reserved.      Do you have sleep apnea, excessive snoring or daytime drowsiness?: " Patient leaves alone. Not sure    Reviewed and updated as needed this visit by clinical staff  Tobacco  Allergies  Meds  Med Hx  Surg Hx  Fam Hx  Soc Hx        Reviewed and updated as needed this visit by Provider        Social History     Tobacco Use     Smoking status: Never Smoker     Smokeless tobacco: Never Used   Substance Use Topics     Alcohol use: No     Alcohol Use 4/24/2019   Prescreen: >3 drinks/day or >7 drinks/week? Not Applicable   Prescreen: >3 drinks/day or >7 drinks/week? -     Current providers sharing in care for this patient include:  Patient Care Team:  Grant Brown MD as PCP - General (Internal Medicine)  Niesha Dietrich MD as MD (Pediatrics)  Grant Brown MD as Assigned PCP  Chinyere Beatty RD as Diabetes Educator (Dietitian, Registered)    The following health maintenance items are reviewed in Epic and correct as of today:  Health Maintenance   Topic Date Due     ANNUAL REVIEW OF HM ORDERS  1945     HEPATITIS B IMMUNIZATION (1 of 3 - Risk 3-dose series) 04/07/1964     PHQ-2  01/01/2020     FALL RISK ASSESSMENT  04/24/2020     MAMMO SCREENING  04/26/2020     INFLUENZA VACCINE (1) 09/01/2020     DIABETIC FOOT EXAM  10/25/2020     A1C  02/06/2021     BMP  04/18/2021     LIPID  04/18/2021     MICROALBUMIN  04/18/2021     VITAMIN B12  04/18/2021     EYE EXAM  05/22/2021     MEDICARE ANNUAL WELLNESS VISIT  08/13/2021     ADVANCE CARE PLANNING  12/06/2021     DTAP/TDAP/TD IMMUNIZATION (6 - Td) 11/06/2029     COLORECTAL CANCER SCREENING  01/16/2030     HEPATITIS C SCREENING  Completed     PNEUMOCOCCAL IMMUNIZATION 65+ LOW/MEDIUM RISK  Completed     ZOSTER IMMUNIZATION  Completed     IPV IMMUNIZATION  Aged Out     MENINGITIS IMMUNIZATION  Aged Out     DEXA  Discontinued     Est Care 4/2020  2. Type 2 diabetes mellitus with diabetic polyneuropathy, without long-term current use of insulin (H)  A1c goal <8 given CAD  Increase likely 2/2 lack of activity,  poor diet, etc. Pt not surprised by this and is motivated to make changes.   Will start with DM Education and f/u in 3 months. If no improvement in A1c would consider addition of second agent.  - Lipid panel reflex to direct LDL Fasting; Future  - AMBULATORY ADULT DIABETES EDUCATOR REFERRAL; Future  - metFORMIN (GLUCOPHAGE) 500 MG tablet; Take 2 tablets (1,000 mg) by mouth 2 times daily (with meals)  Dispense: 360 tablet; Refill: 0  - **A1C FUTURE anytime; Future     3. High triglycerides  4. Hyperlipidemia LDL goal <70  Triglycerides always high but higher than usual with poor diet and weight gain.   Discussed diet/exericse as above and will recheck in 3 months.   If triglycerides still at this level consider changing to atorvastatin for triglyceride lowering effect (LDL has historically been okay on moderate dose statin).  - Lipid panel reflex to direct LDL Fasting; Future  - simvastatin (ZOCOR) 20 MG tablet; Take 1 tablet (20 mg) by mouth At Bedtime  Dispense: 90 tablet; Refill: 1     5. History of nephrolithiasis  - Follows by Urology (Dr. Orona), last OV 9/2019, plan for f/u in 1 year for a CT scan to evaluate for any new stones  - potassium citrate has led to diarrhea (see below)     6. Diarrhea, unspecified type  Likely medication induced as it started with addition of potassium citrate. It is limiting her activity as she can go upwards of 7x a day and is afraid to leave her house.   I encouraged her to call Urology and see if there are other options.   If there are not other options would have her meet with MTM to review meds - remove ones with diarrhea as side effect (metformin) and address her medical conditions differently.     7. Vitamin B12 deficiency without anemia    Ref. Range 4/18/2020 09:59   Vitamin B12 Latest Ref Range: 193 - 986 pg/mL 565   - continue supplementation  - cyanocobalamin (VITAMIN B-12) 1000 MCG tablet; Take 1 tablet (1,000 mcg) by mouth daily  Dispense: 100 tablet; Refill:  3     8. Chronic seasonal allergic rhinitis due to pollen  - loratadine (CLARITIN) 10 MG tablet; Take 1 tablet (10 mg) by mouth daily  Dispense: 90 tablet; Refill: 3     9. Vitamin D deficiency  - Vitamin D Deficiency; Future     10. Encounter for screening mammogram for breast cancer  - *MA Screening Digital Bilateral; Future      # HCM   - due for eye exam (scheduled)  - mammo due 4/2020  - utd on colon cancer screening (2030)    # Chest pain  - picked up BB -> never got a call to schedule    # Diarrhea  - called Urology and his RN talked to him -> changed from liquid to pills -> when she was on the pills before it took 3-4 years to develop stones (without anything was every 8 months)  - still with loose stools but only one accident    # DM  Lab Results   Component Value Date    A1C 7.6 08/06/2020    A1C Canceled, Test credited 04/30/2020    A1C 7.8 04/18/2020    A1C 6.9 09/11/2019    A1C 7.0 04/19/2019   Has been doing stress eating + grandkids at house more frequently (treats). She loves to bake but now she's back in the dorms.   Metformin  Statin + ace inhibitor     # HTN  BP Readings from Last 6 Encounters:   08/13/20 122/64   07/22/20 122/76   01/16/20 93/65   12/16/19 124/70   10/25/19 122/66   09/13/19 139/81   - lisinopril 20 mg    # Weight  Wt Readings from Last 4 Encounters:   08/13/20 96.3 kg (212 lb 6.4 oz)   07/22/20 96.4 kg (212 lb 9.6 oz)   01/16/20 95.3 kg (210 lb)   12/16/19 95.3 kg (210 lb 1.6 oz)   - doesn't have an exercise plan but does have stationary bike.   - hasn't been using it bc she finds that after 3-4 min it's hard to keep going bc trouble breathing     # Eyelid droop  - s/p surgery for both sides 2 weeks ago and has been doing well    # Dentist  - just had filling replaced last week    # Allergies  - ragwood season right now    # Triglycerides  - used to be on a medication to lower these but hers actually went up    # Calcium  Mom had a condition where her calcium was always high.  "  One of her sisters just had parathyroid removed     Review of Systems  Constitutional, HEENT, cardiovascular, pulmonary, gi and gu systems are negative, except as otherwise noted.    OBJECTIVE:   /64 (BP Location: Right arm, Patient Position: Chair, Cuff Size: Adult Large)   Pulse 96   Temp 97.8  F (36.6  C) (Tympanic)   Resp 16   Ht 1.613 m (5' 3.5\")   Wt 96.3 kg (212 lb 6.4 oz)   SpO2 91%   BMI 37.03 kg/m   Estimated body mass index is 37.03 kg/m  as calculated from the following:    Height as of this encounter: 1.613 m (5' 3.5\").    Weight as of this encounter: 96.3 kg (212 lb 6.4 oz).  Physical Exam  GENERAL: healthy, alert and no distress  EYES: Eyes grossly normal to inspection, PERRL and conjunctivae and sclerae normal  HENT: ear canals and TM's normal, nose and mouth without ulcers or lesions  NECK: no adenopathy, no asymmetry, masses, or scars and thyroid normal to palpation  RESP: lungs clear to auscultation - no rales, rhonchi or wheezes  BREAST: small dime sized nontender cyst medial top of left breast, tenderness or nipple discharge and no palpable axillary masses or adenopathy  CV: regular rate and rhythm, normal S1 S2, no S3 or S4, no murmur, click or rub, no peripheral edema and peripheral pulses strong  ABDOMEN: soft, nontender, no hepatosplenomegaly, no masses and bowel sounds normal  MS: no gross musculoskeletal defects noted, no edema  SKIN: no suspicious lesions or rashes  NEURO: Normal strength and tone, mentation intact and speech normal  PSYCH: mentation appears normal, affect normal/bright    Diagnostic Test Results:  Labs reviewed in Epic    ASSESSMENT / PLAN:   1. Encounter for Medicare annual wellness exam    2. BMI > 35.0 with Comorbidity (H)  Discussed diet/exercise.     3. Type 2 diabetes mellitus with diabetic polyneuropathy, without long-term current use of insulin (H)  Lab Results   Component Value Date    A1C 7.6 08/06/2020    A1C Canceled, Test credited 04/30/2020 "    A1C 7.8 04/18/2020    A1C 6.9 09/11/2019    A1C 7.0 04/19/2019     Would like to work on diet/exercise. Cuauhtemoc with covid has not been as great.   Continue ace, statin.   ASA indicated with CAD.  - lisinopril (ZESTRIL) 20 MG tablet; Take 1 tablet (20 mg) by mouth daily  Dispense: 90 tablet; Refill: 3  - metFORMIN (GLUCOPHAGE) 500 MG tablet; Take 2 tablets (1,000 mg) by mouth 2 times daily (with meals)  Dispense: 360 tablet; Refill: 3  - simvastatin (ZOCOR) 20 MG tablet; Take 1 tablet (20 mg) by mouth At Bedtime  Dispense: 90 tablet; Refill: 3  - FOOT EXAM    4. Benign essential hypertension  BP Readings from Last 6 Encounters:   08/13/20 122/64   07/22/20 122/76   01/16/20 93/65   12/16/19 124/70   10/25/19 122/66   09/13/19 139/81     - lisinopril (ZESTRIL) 20 MG tablet; Take 1 tablet (20 mg) by mouth daily  Dispense: 90 tablet; Refill: 3    5. Hyperlipidemia LDL goal <70  - simvastatin (ZOCOR) 20 MG tablet; Take 1 tablet (20 mg) by mouth At Bedtime  Dispense: 90 tablet; Refill: 3    6. High triglycerides  Recheck in 3 months - would consider changing to stronger statin (lipitor) if remain elevated.     7. Coronary artery disease involving native coronary artery of native heart without angina pectoris  On asa.   Will have RN PAL help schedule CT angio.     8. Vitamin B12 deficiency without anemia  - cyanocobalamin (VITAMIN B-12) 1000 MCG tablet; Take 1 tablet (1,000 mcg) by mouth daily  Dispense: 100 tablet; Refill: 1    9. Chronic seasonal allergic rhinitis due to pollen  - loratadine (CLARITIN) 10 MG tablet; Take 1 tablet (10 mg) by mouth daily  Dispense: 90 tablet; Refill: 0    --------------  PATIENT INSTRUCTIONS  Getting back on the bike!  - I expect it to be hard at first bc you haven't been doing it  - Keep working on one more minute at a time - let me know if you're really working on it and you're not making progress    I'll have my team call you in the next week or so and help schedule the heart imaging.  "They will also schedule  you to see my Nurse Practitoner Pamela a week after the labs (fasting labs and A1c in 3 months).      ---------------------    If A1C improved can see me 3 months after Pamela and space to q6 month OV.     COUNSELING:  Reviewed preventive health counseling, as reflected in patient instructions       Regular exercise       Healthy diet/nutrition       Fall risk prevention       Aspirin Prophylaxsis       Osteoporosis Prevention/Bone Health    Estimated body mass index is 37.03 kg/m  as calculated from the following:    Height as of this encounter: 1.613 m (5' 3.5\").    Weight as of this encounter: 96.3 kg (212 lb 6.4 oz).    Weight management plan: Discussed healthy diet and exercise guidelines     reports that she has never smoked. She has never used smokeless tobacco.      Appropriate preventive services were discussed with this patient, including applicable screening as appropriate for cardiovascular disease, diabetes, osteopenia/osteoporosis, and glaucoma.  As appropriate for age/gender, discussed screening for colorectal cancer, prostate cancer, breast cancer, and cervical cancer. Checklist reviewing preventive services available has been given to the patient.    Reviewed patients plan of care and provided an AVS. The Intermediate Care Plan ( asthma action plan, low back pain action plan, and migraine action plan) for Debi meets the Care Plan requirement. This Care Plan has been established and reviewed with the Patient.    Counseling Resources:  ATP IV Guidelines  Pooled Cohorts Equation Calculator  Breast Cancer Risk Calculator  FRAX Risk Assessment  ICSI Preventive Guidelines  Dietary Guidelines for Americans, 2010  USDA's MyPlate  ASA Prophylaxis  Lung CA Screening    Grant Brown MD  [facility]    Identified Health Risks:  "

## 2020-08-06 NOTE — PATIENT INSTRUCTIONS
Getting back on the bike!  - I expect it to be hard at first bc you haven't been doing it  - Keep working on one more minute at a time - let me know if you're really working on it and you're not making progress    I'll have my team call you in the next week or so and help schedule the heart imaging. They will also schedule  you to see my Nurse Practitoner Pamela a week after the labs (fasting labs and A1c in 3 months).    Patient Education   Personalized Prevention Plan  You are due for the preventive services outlined below.  Your care team is available to assist you in scheduling these services.  If you have already completed any of these items, please share that information with your care team to update in your medical record.  Health Maintenance Due   Topic Date Due     ANNUAL REVIEW OF HM ORDERS  1945     PHQ-2  01/01/2020     Annual Wellness Visit  04/24/2020     FALL RISK ASSESSMENT  04/24/2020     Mammogram  04/26/2020

## 2020-08-13 ENCOUNTER — OFFICE VISIT (OUTPATIENT)
Dept: PEDIATRICS | Facility: CLINIC | Age: 75
End: 2020-08-13
Payer: COMMERCIAL

## 2020-08-13 VITALS
WEIGHT: 212.4 LBS | OXYGEN SATURATION: 91 % | SYSTOLIC BLOOD PRESSURE: 122 MMHG | HEIGHT: 64 IN | DIASTOLIC BLOOD PRESSURE: 64 MMHG | TEMPERATURE: 97.8 F | RESPIRATION RATE: 16 BRPM | BODY MASS INDEX: 36.26 KG/M2 | HEART RATE: 96 BPM

## 2020-08-13 DIAGNOSIS — E78.1 HIGH TRIGLYCERIDES: ICD-10-CM

## 2020-08-13 DIAGNOSIS — Z00.00 ENCOUNTER FOR MEDICARE ANNUAL WELLNESS EXAM: Primary | ICD-10-CM

## 2020-08-13 DIAGNOSIS — E78.5 HYPERLIPIDEMIA LDL GOAL <70: ICD-10-CM

## 2020-08-13 DIAGNOSIS — I25.10 CORONARY ARTERY DISEASE INVOLVING NATIVE CORONARY ARTERY OF NATIVE HEART WITHOUT ANGINA PECTORIS: ICD-10-CM

## 2020-08-13 DIAGNOSIS — E11.42 TYPE 2 DIABETES MELLITUS WITH DIABETIC POLYNEUROPATHY, WITHOUT LONG-TERM CURRENT USE OF INSULIN (H): ICD-10-CM

## 2020-08-13 DIAGNOSIS — E53.8 VITAMIN B12 DEFICIENCY WITHOUT ANEMIA: ICD-10-CM

## 2020-08-13 DIAGNOSIS — I10 BENIGN ESSENTIAL HYPERTENSION: ICD-10-CM

## 2020-08-13 DIAGNOSIS — J30.1 CHRONIC SEASONAL ALLERGIC RHINITIS DUE TO POLLEN: ICD-10-CM

## 2020-08-13 DIAGNOSIS — E66.01 MORBID OBESITY DUE TO EXCESS CALORIES (H): ICD-10-CM

## 2020-08-13 PROCEDURE — 99213 OFFICE O/P EST LOW 20 MIN: CPT | Mod: 25 | Performed by: INTERNAL MEDICINE

## 2020-08-13 PROCEDURE — 99207 C FOOT EXAM  NO CHARGE: CPT | Mod: 25 | Performed by: INTERNAL MEDICINE

## 2020-08-13 PROCEDURE — 99397 PER PM REEVAL EST PAT 65+ YR: CPT | Performed by: INTERNAL MEDICINE

## 2020-08-13 RX ORDER — LORATADINE 10 MG/1
10 TABLET ORAL DAILY
Qty: 90 TABLET | Refills: 0 | Status: SHIPPED | OUTPATIENT
Start: 2020-08-13 | End: 2020-11-09

## 2020-08-13 RX ORDER — LANOLIN ALCOHOL/MO/W.PET/CERES
1000 CREAM (GRAM) TOPICAL DAILY
Qty: 100 TABLET | Refills: 1 | Status: SHIPPED | OUTPATIENT
Start: 2020-08-13 | End: 2021-04-05

## 2020-08-13 RX ORDER — LISINOPRIL 20 MG/1
20 TABLET ORAL DAILY
Qty: 90 TABLET | Refills: 3 | Status: SHIPPED | OUTPATIENT
Start: 2020-08-13

## 2020-08-13 RX ORDER — SIMVASTATIN 20 MG
20 TABLET ORAL AT BEDTIME
Qty: 90 TABLET | Refills: 3 | Status: SHIPPED | OUTPATIENT
Start: 2020-08-13 | End: 2020-11-09

## 2020-08-13 ASSESSMENT — ACTIVITIES OF DAILY LIVING (ADL): CURRENT_FUNCTION: NO ASSISTANCE NEEDED

## 2020-08-13 ASSESSMENT — MIFFLIN-ST. JEOR: SCORE: 1435.5

## 2020-08-13 NOTE — Clinical Note
I'll have my team call you in the next week or so and help schedule the heart imaging.(CT angio ordered). They will also schedule  you to see my Nurse Practitoner Pamela a week after the labs (fasting labs and A1c in 3 months).

## 2020-08-14 ENCOUNTER — TELEPHONE (OUTPATIENT)
Dept: PEDIATRICS | Facility: CLINIC | Age: 75
End: 2020-08-14

## 2020-08-14 NOTE — TELEPHONE ENCOUNTER
Grant Brown MD P Ea Sb3 Maurice LEDESMA (Rn)               I'll have my team call you in the next week or so and help schedule the heart imaging.(CT angio ordered). They will also schedule  you to see my Nurse Practitoner Pamela a week after the labs (fasting labs and A1c in 3 months).

## 2020-08-17 NOTE — TELEPHONE ENCOUNTER
Attempted to call patient, left VM to return call to RN PAL.     Patient called back, reviewed. Transferred patient to cardiology to schedule CT Angiogram.    Will send reminder to assist pt with scheduling with Pamela in November after lab appt.

## 2020-08-21 ENCOUNTER — HOSPITAL ENCOUNTER (OUTPATIENT)
Dept: CARDIOLOGY | Facility: CLINIC | Age: 75
Discharge: HOME | End: 2020-08-21
Attending: INTERNAL MEDICINE | Admitting: INTERNAL MEDICINE
Payer: COMMERCIAL

## 2020-08-21 VITALS — SYSTOLIC BLOOD PRESSURE: 108 MMHG | DIASTOLIC BLOOD PRESSURE: 62 MMHG | HEART RATE: 82 BPM

## 2020-08-21 DIAGNOSIS — I20.89 STABLE ANGINA (H): ICD-10-CM

## 2020-08-21 PROBLEM — K76.0 FATTY INFILTRATION OF LIVER: Status: ACTIVE | Noted: 2020-08-21

## 2020-08-21 LAB
CREAT BLD-MCNC: 1 MG/DL (ref 0.52–1.04)
GFR SERPL CREATININE-BSD FRML MDRD: 54 ML/MIN/{1.73_M2}

## 2020-08-21 PROCEDURE — 75574 CT ANGIO HRT W/3D IMAGE: CPT

## 2020-08-21 PROCEDURE — 25000125 ZZHC RX 250: Performed by: INTERNAL MEDICINE

## 2020-08-21 PROCEDURE — 25000132 ZZH RX MED GY IP 250 OP 250 PS 637: Performed by: INTERNAL MEDICINE

## 2020-08-21 PROCEDURE — 75574 CT ANGIO HRT W/3D IMAGE: CPT | Mod: 26 | Performed by: INTERNAL MEDICINE

## 2020-08-21 PROCEDURE — 25800030 ZZH RX IP 258 OP 636: Performed by: INTERNAL MEDICINE

## 2020-08-21 PROCEDURE — 82565 ASSAY OF CREATININE: CPT

## 2020-08-21 PROCEDURE — 25000128 H RX IP 250 OP 636: Performed by: INTERNAL MEDICINE

## 2020-08-21 RX ORDER — ACYCLOVIR 200 MG/1
0-1 CAPSULE ORAL
Status: DISCONTINUED | OUTPATIENT
Start: 2020-08-21 | End: 2020-08-22 | Stop reason: HOSPADM

## 2020-08-21 RX ORDER — DIPHENHYDRAMINE HYDROCHLORIDE 50 MG/ML
25-50 INJECTION INTRAMUSCULAR; INTRAVENOUS
Status: DISCONTINUED | OUTPATIENT
Start: 2020-08-21 | End: 2020-08-22 | Stop reason: HOSPADM

## 2020-08-21 RX ORDER — IVABRADINE 5 MG/1
5-15 TABLET, FILM COATED ORAL
Status: DISCONTINUED | OUTPATIENT
Start: 2020-08-21 | End: 2020-08-22 | Stop reason: HOSPADM

## 2020-08-21 RX ORDER — METOPROLOL TARTRATE 25 MG/1
25-100 TABLET, FILM COATED ORAL
Status: COMPLETED | OUTPATIENT
Start: 2020-08-21 | End: 2020-08-21

## 2020-08-21 RX ORDER — METHYLPREDNISOLONE SODIUM SUCCINATE 125 MG/2ML
125 INJECTION, POWDER, LYOPHILIZED, FOR SOLUTION INTRAMUSCULAR; INTRAVENOUS
Status: DISCONTINUED | OUTPATIENT
Start: 2020-08-21 | End: 2020-08-22 | Stop reason: HOSPADM

## 2020-08-21 RX ORDER — ONDANSETRON 2 MG/ML
4 INJECTION INTRAMUSCULAR; INTRAVENOUS
Status: DISCONTINUED | OUTPATIENT
Start: 2020-08-21 | End: 2020-08-22 | Stop reason: HOSPADM

## 2020-08-21 RX ORDER — DILTIAZEM HYDROCHLORIDE 5 MG/ML
10-15 INJECTION INTRAVENOUS
Status: DISCONTINUED | OUTPATIENT
Start: 2020-08-21 | End: 2020-08-22 | Stop reason: HOSPADM

## 2020-08-21 RX ORDER — DILTIAZEM HYDROCHLORIDE 120 MG/1
120 TABLET, FILM COATED ORAL
Status: DISCONTINUED | OUTPATIENT
Start: 2020-08-21 | End: 2020-08-22 | Stop reason: HOSPADM

## 2020-08-21 RX ORDER — METOPROLOL TARTRATE 1 MG/ML
5-15 INJECTION, SOLUTION INTRAVENOUS
Status: DISCONTINUED | OUTPATIENT
Start: 2020-08-21 | End: 2020-08-22 | Stop reason: HOSPADM

## 2020-08-21 RX ORDER — NITROGLYCERIN 0.4 MG/1
0.4 TABLET SUBLINGUAL
Status: DISCONTINUED | OUTPATIENT
Start: 2020-08-21 | End: 2020-08-22 | Stop reason: HOSPADM

## 2020-08-21 RX ORDER — IOPAMIDOL 755 MG/ML
500 INJECTION, SOLUTION INTRAVASCULAR ONCE
Status: COMPLETED | OUTPATIENT
Start: 2020-08-21 | End: 2020-08-21

## 2020-08-21 RX ORDER — DIPHENHYDRAMINE HCL 25 MG
25 CAPSULE ORAL
Status: DISCONTINUED | OUTPATIENT
Start: 2020-08-21 | End: 2020-08-22 | Stop reason: HOSPADM

## 2020-08-21 RX ADMIN — NITROGLYCERIN 0.4 MG: 0.4 TABLET SUBLINGUAL at 11:20

## 2020-08-21 RX ADMIN — METOPROLOL TARTRATE 50 MG: 50 TABLET, FILM COATED ORAL at 09:47

## 2020-08-21 RX ADMIN — IOPAMIDOL 500 ML: 755 INJECTION, SOLUTION INTRAVENOUS at 11:35

## 2020-08-21 RX ADMIN — SODIUM CHLORIDE 100 ML: 9 INJECTION, SOLUTION INTRAVENOUS at 11:35

## 2020-08-21 RX ADMIN — METOPROLOL TARTRATE 10 MG: 5 INJECTION INTRAVENOUS at 11:25

## 2020-08-21 RX ADMIN — METOPROLOL TARTRATE 10 MG: 5 INJECTION INTRAVENOUS at 11:15

## 2020-09-09 ENCOUNTER — ANCILLARY PROCEDURE (OUTPATIENT)
Dept: MAMMOGRAPHY | Facility: CLINIC | Age: 75
End: 2020-09-09
Attending: INTERNAL MEDICINE
Payer: COMMERCIAL

## 2020-09-09 DIAGNOSIS — Z12.31 ENCOUNTER FOR SCREENING MAMMOGRAM FOR BREAST CANCER: ICD-10-CM

## 2020-09-09 PROCEDURE — 77067 SCR MAMMO BI INCL CAD: CPT | Mod: TC

## 2020-09-18 ENCOUNTER — HOSPITAL ENCOUNTER (OUTPATIENT)
Dept: CT IMAGING | Facility: CLINIC | Age: 75
Discharge: HOME | End: 2020-09-18
Attending: UROLOGY | Admitting: UROLOGY
Payer: COMMERCIAL

## 2020-09-18 DIAGNOSIS — N20.0 KIDNEY STONE: ICD-10-CM

## 2020-09-18 PROCEDURE — 74176 CT ABD & PELVIS W/O CONTRAST: CPT

## 2020-10-27 ENCOUNTER — DOCUMENTATION ONLY (OUTPATIENT)
Dept: LAB | Facility: CLINIC | Age: 75
End: 2020-10-27

## 2020-10-27 DIAGNOSIS — I25.10 CORONARY ARTERY DISEASE INVOLVING NATIVE CORONARY ARTERY OF NATIVE HEART WITHOUT ANGINA PECTORIS: ICD-10-CM

## 2020-10-27 DIAGNOSIS — E11.42 TYPE 2 DIABETES MELLITUS WITH DIABETIC POLYNEUROPATHY, WITHOUT LONG-TERM CURRENT USE OF INSULIN (H): Primary | ICD-10-CM

## 2020-10-27 DIAGNOSIS — E78.5 HYPERLIPIDEMIA LDL GOAL <70: ICD-10-CM

## 2020-10-27 DIAGNOSIS — I10 BENIGN ESSENTIAL HYPERTENSION: ICD-10-CM

## 2020-11-02 DIAGNOSIS — I10 BENIGN ESSENTIAL HYPERTENSION: ICD-10-CM

## 2020-11-02 DIAGNOSIS — I25.10 CORONARY ARTERY DISEASE INVOLVING NATIVE CORONARY ARTERY OF NATIVE HEART WITHOUT ANGINA PECTORIS: ICD-10-CM

## 2020-11-02 DIAGNOSIS — E78.5 HYPERLIPIDEMIA LDL GOAL <70: ICD-10-CM

## 2020-11-02 DIAGNOSIS — E11.42 TYPE 2 DIABETES MELLITUS WITH DIABETIC POLYNEUROPATHY, WITHOUT LONG-TERM CURRENT USE OF INSULIN (H): ICD-10-CM

## 2020-11-02 LAB
ALBUMIN SERPL-MCNC: 3.3 G/DL (ref 3.4–5)
ALP SERPL-CCNC: 84 U/L (ref 40–150)
ALT SERPL W P-5'-P-CCNC: 30 U/L (ref 0–50)
ANION GAP SERPL CALCULATED.3IONS-SCNC: 10 MMOL/L (ref 3–14)
AST SERPL W P-5'-P-CCNC: 38 U/L (ref 0–45)
BILIRUB SERPL-MCNC: 0.4 MG/DL (ref 0.2–1.3)
BUN SERPL-MCNC: 17 MG/DL (ref 7–30)
CALCIUM SERPL-MCNC: 9.8 MG/DL (ref 8.5–10.1)
CHLORIDE SERPL-SCNC: 108 MMOL/L (ref 94–109)
CHOLEST SERPL-MCNC: 143 MG/DL
CO2 SERPL-SCNC: 19 MMOL/L (ref 20–32)
CREAT SERPL-MCNC: 0.97 MG/DL (ref 0.52–1.04)
GFR SERPL CREATININE-BSD FRML MDRD: 57 ML/MIN/{1.73_M2}
GLUCOSE SERPL-MCNC: 209 MG/DL (ref 70–99)
HBA1C MFR BLD: 7.8 % (ref 0–5.6)
HDLC SERPL-MCNC: 33 MG/DL
LDLC SERPL CALC-MCNC: ABNORMAL MG/DL
LDLC SERPL DIRECT ASSAY-MCNC: 69 MG/DL
NONHDLC SERPL-MCNC: 110 MG/DL
POTASSIUM SERPL-SCNC: 4.3 MMOL/L (ref 3.4–5.3)
PROT SERPL-MCNC: 7 G/DL (ref 6.8–8.8)
SODIUM SERPL-SCNC: 137 MMOL/L (ref 133–144)
TRIGL SERPL-MCNC: 434 MG/DL

## 2020-11-02 PROCEDURE — 83036 HEMOGLOBIN GLYCOSYLATED A1C: CPT | Performed by: INTERNAL MEDICINE

## 2020-11-02 PROCEDURE — 83721 ASSAY OF BLOOD LIPOPROTEIN: CPT | Mod: 59 | Performed by: INTERNAL MEDICINE

## 2020-11-02 PROCEDURE — 80061 LIPID PANEL: CPT | Performed by: INTERNAL MEDICINE

## 2020-11-02 PROCEDURE — 80053 COMPREHEN METABOLIC PANEL: CPT | Performed by: INTERNAL MEDICINE

## 2020-11-06 DIAGNOSIS — N20.0 KIDNEY STONE: Primary | ICD-10-CM

## 2020-11-09 ENCOUNTER — OFFICE VISIT (OUTPATIENT)
Dept: PEDIATRICS | Facility: CLINIC | Age: 75
End: 2020-11-09
Payer: COMMERCIAL

## 2020-11-09 VITALS
SYSTOLIC BLOOD PRESSURE: 118 MMHG | TEMPERATURE: 97.9 F | DIASTOLIC BLOOD PRESSURE: 54 MMHG | OXYGEN SATURATION: 98 % | HEIGHT: 64 IN | BODY MASS INDEX: 36.57 KG/M2 | RESPIRATION RATE: 15 BRPM | WEIGHT: 214.2 LBS | HEART RATE: 92 BPM

## 2020-11-09 DIAGNOSIS — N18.31 STAGE 3A CHRONIC KIDNEY DISEASE (H): ICD-10-CM

## 2020-11-09 DIAGNOSIS — E11.42 TYPE 2 DIABETES MELLITUS WITH DIABETIC POLYNEUROPATHY, WITHOUT LONG-TERM CURRENT USE OF INSULIN (H): Primary | ICD-10-CM

## 2020-11-09 DIAGNOSIS — E78.5 HYPERLIPIDEMIA LDL GOAL <70: ICD-10-CM

## 2020-11-09 DIAGNOSIS — J30.1 CHRONIC SEASONAL ALLERGIC RHINITIS DUE TO POLLEN: ICD-10-CM

## 2020-11-09 DIAGNOSIS — I25.10 CORONARY ARTERY DISEASE INVOLVING NATIVE CORONARY ARTERY OF NATIVE HEART WITHOUT ANGINA PECTORIS: ICD-10-CM

## 2020-11-09 DIAGNOSIS — E78.1 HIGH TRIGLYCERIDES: ICD-10-CM

## 2020-11-09 DIAGNOSIS — I10 BENIGN ESSENTIAL HYPERTENSION: ICD-10-CM

## 2020-11-09 PROBLEM — N18.30 CHRONIC KIDNEY DISEASE, STAGE 3 (H): Status: ACTIVE | Noted: 2020-11-09

## 2020-11-09 PROCEDURE — 99213 OFFICE O/P EST LOW 20 MIN: CPT | Performed by: NURSE PRACTITIONER

## 2020-11-09 RX ORDER — LORATADINE 10 MG/1
10 TABLET ORAL DAILY
Qty: 90 TABLET | Refills: 0 | Status: SHIPPED | OUTPATIENT
Start: 2020-11-09

## 2020-11-09 RX ORDER — ATORVASTATIN CALCIUM 20 MG/1
20 TABLET, FILM COATED ORAL DAILY
Qty: 90 TABLET | Refills: 1 | Status: SHIPPED | OUTPATIENT
Start: 2020-11-09 | End: 2021-05-10

## 2020-11-09 ASSESSMENT — MIFFLIN-ST. JEOR: SCORE: 1451.6

## 2020-11-09 NOTE — PATIENT INSTRUCTIONS
1.  Discontinued simvastatin 20 mg.   2.  Start on atorvastatin 20 mg  3.  Follow-up with new PCP in 3 months

## 2020-11-09 NOTE — PROGRESS NOTES
"Subjective     Debi Mo is a 75 year old female who presents to clinic today for the following health issues:    HPI         Patient is here to discuss labs from 11/02/2020.   -Patient is switching clinics due to [employer] insurance  -Lab results stable  -Triglycerides are still elevated  -Needs refills    Review of Systems   Constitutional, HEENT, cardiovascular, pulmonary, gi and gu systems are negative, except as otherwise noted.      Objective    /54 (BP Location: Right arm, Patient Position: Chair, Cuff Size: Adult Large)   Pulse 92   Temp 97.9  F (36.6  C) (Tympanic)   Resp 15   Ht 1.626 m (5' 4\")   Wt 97.2 kg (214 lb 3.2 oz)   SpO2 98%   BMI 36.77 kg/m    Body mass index is 36.77 kg/m .     Physical Exam   GENERAL: healthy, alert and no distress  RESP: lungs clear to auscultation - no rales, rhonchi or wheezes  CV: regular rate and rhythm, normal S1 S2, no S3 or S4, no murmur, click or rub, no peripheral edema and peripheral pulses strong  PSYCH: mentation appears normal, affect normal/bright        Assessment & Plan     1. Type 2 diabetes mellitus with diabetic polyneuropathy, without long-term current use of insulin (H)  DM stable  Continue current treatment regimen  Follow-up in 3 months or sooner if needed    2. Benign essential hypertension  Continue taking medication    3. Hyperlipidemia LDL goal <70  Changed from simvastatin to atorvastatin  - atorvastatin (LIPITOR) 20 MG tablet; Take 1 tablet (20 mg) by mouth daily  Dispense: 90 tablet; Refill: 1    4. High triglycerides  Changed from simvastatin to atorvastatin  - atorvastatin (LIPITOR) 20 MG tablet; Take 1 tablet (20 mg) by mouth daily  Dispense: 90 tablet; Refill: 1    5. Stage 3a chronic kidney disease  Will continue to monitor kidney function    6. Coronary artery disease involving native coronary artery of native heart without angina pectoris  - atorvastatin (LIPITOR) 20 MG tablet; Take 1 tablet (20 mg) by mouth daily  " Dispense: 90 tablet; Refill: 1    7. Chronic seasonal allergic rhinitis due to pollen  - loratadine (CLARITIN) 10 MG tablet; Take 1 tablet (10 mg) by mouth daily  Dispense: 90 tablet; Refill: 0          See Patient Instructions  Return if symptoms worsen or fail to improve.    Pamela Mukherjee NP  [facility]

## 2020-11-19 ENCOUNTER — VIRTUAL VISIT (OUTPATIENT)
Dept: UROLOGY | Facility: CLINIC | Age: 75
End: 2020-11-19
Payer: COMMERCIAL

## 2020-11-19 VITALS — BODY MASS INDEX: 36.54 KG/M2 | WEIGHT: 214 LBS | HEIGHT: 64 IN

## 2020-11-19 DIAGNOSIS — N20.0 NEPHROLITHIASIS: Primary | ICD-10-CM

## 2020-11-19 PROCEDURE — 99213 OFFICE O/P EST LOW 20 MIN: CPT | Mod: 95 | Performed by: UROLOGY

## 2020-11-19 ASSESSMENT — MIFFLIN-ST. JEOR: SCORE: 1450.7

## 2020-11-19 ASSESSMENT — PAIN SCALES - GENERAL: PAINLEVEL: NO PAIN (0)

## 2020-11-19 NOTE — LETTER
"11/19/2020       RE: Debi Mo  [address] [place] [address]     Dear Colleague,    Thank you for referring your patient, Debi Mo, to the [facility] UROLOGY CLINIC [place] [facility]. Please see a copy of my visit note below.    Debi Mo is a 75 year old female who is being evaluated via a billable video visit.      The patient has been notified of following:     \"This video visit will be conducted via a call between you and your physician/provider. We have found that certain health care needs can be provided without the need for an in-person physical exam.  This service lets us provide the care you need with a video conversation.  If a prescription is necessary we can send it directly to your pharmacy.  If lab work is needed we can place an order for that and you can then stop by our lab to have the test done at a later time.    Video visits are billed at different rates depending on your insurance coverage.  Please reach out to your insurance provider with any questions.    If during the course of the call the physician/provider feels a video visit is not appropriate, you will not be charged for this service.\"    Patient has given verbal consent for Video visit? Yes  How would you like to obtain your AVS? MyChart  If you are dropped from the video visit, the video invite should be resent to: Text to cell phone: [phone]  Will anyone else be joining your video visit? Makenzie Leslie [facility]      Office Visit Note  [facility] Urology [facility]  [phone]    Nov 19, 2020    [unfilled]    1945    UROLOGIC DIAGNOSES:    History of stones    CURRENT INTERVENTIONS:        History:    Berna underwent extensive and lengthy bilateral ureteroscopy 1 year ago to remove bilateral kidney stones.  She has felt well since her surgery 1 year ago and has had no symptoms or flank pain.      Imaging: I reviewed her CT scan images and compared them " to her 2019 images.  Stone burden is much improved on both sides.  She does have 3 stones on the right side, the largest of which measures about 6 mm.  On the left side there is a small cluster of stones in the lower pole.    Labs:      MEDICATIONS:    Current Outpatient Medications:      aspirin 81 MG tablet, Take 81 mg by mouth daily, Disp: , Rfl:      atorvastatin (LIPITOR) 20 MG tablet, Take 1 tablet (20 mg) by mouth daily, Disp: 90 tablet, Rfl: 1     blood glucose monitoring (ONE TOUCH DELICA) lancets, Use to test blood sugars 1 times daily or as directed., Disp: 100 each, Rfl: 11     blood glucose monitoring (ONE TOUCH VERIO IQ) test strip, Use to test blood sugars 1 times daily or as directed., Disp: 50 each, Rfl: 11     blood glucose monitoring (ONETOUCH VERIO) meter device kit, Use to test blood sugars 1 times daily or as directed., Disp: 1 kit, Rfl: 0     Cholecalciferol (VITAMIN D3 PO), Take 1,000 Units by mouth daily, Disp: , Rfl:      Coenzyme Q10 (CO Q 10 PO), Take 100 mg by mouth daily , Disp: , Rfl:      cyanocobalamin (VITAMIN B-12) 1000 MCG tablet, Take 1 tablet (1,000 mcg) by mouth daily, Disp: 100 tablet, Rfl: 1     lisinopril (ZESTRIL) 20 MG tablet, Take 1 tablet (20 mg) by mouth daily, Disp: 90 tablet, Rfl: 3     loratadine (CLARITIN) 10 MG tablet, Take 1 tablet (10 mg) by mouth daily, Disp: 90 tablet, Rfl: 0     metFORMIN (GLUCOPHAGE) 500 MG tablet, Take 2 tablets (1,000 mg) by mouth 2 times daily (with meals), Disp: 360 tablet, Rfl: 3     potassium citrate (UROCIT-K) 10 MEQ (1080 MG) CR tablet, Take 1 tablet (10 mEq) by mouth 3 times daily (with meals), Disp: 90 tablet, Rfl: 11    ALLERGIES:     Allergies   Allergen Reactions     Dust Mites      Seasonal Allergies        REVIEW OF SYSTEMS:   Skin: No rash, pruritis, or skin pigmentation  Eyes: No changes in vision  Ears/Nose/Throat: No changes in hearing, no nosebleeds  Respiratory: No shortness of breath, dyspnea on exertion, cough, or  hemoptysis  Cardiovascular: No chest pain or palpitations  Gastrointestinal: No diarrhea or constipation. No abdominal pain. No hematochezia  Genitourinary: see HPI  Musculoskeletal: No pain or swelling of joints, normal range of motion  Neurologic: No weakness or tremors  Psychiatric: No recent changes in memory or mood  Hematologic/Lymphatic/Immunologic: No easy bruising or enlarged lymph nodes  Endocrine: No weight gain or loss    SURGICAL HISTORY:    Past Surgical History:   Procedure Laterality Date     CARDIAC SURGERY      ANGIOGRAM     CHOLECYSTECTOMY       COLONOSCOPY       COLONOSCOPY Left 1/16/2020    Procedure: COLONOSCOPY, WITH POLYPECTOMY AND BIOPSY polypectomy using jumbo cold bx forcep;  Surgeon: Katie Eric MD;  [facility]: [facility]     COMBINED CYSTOSCOPY, RETROGRADES, URETEROSCOPY, LASER HOLMIUM LITHOTRIPSY URETER(S), INSERT STENT Bilateral 9/28/2016    Procedure: COMBINED CYSTOSCOPY, RETROGRADES, URETEROSCOPY, LASER HOLMIUM LITHOTRIPSY URETER(S), INSERT STENT;  Surgeon: Lester Orona MD;  Location: [facility]     CYSTOSCOPY       CYSTOSCOPY, RETROGRADES, COMBINED  4/17/2013    Procedure: COMBINED CYSTOSCOPY, RETROGRADES;;  Surgeon: Lester Orona MD;  Location: [facility]     CYSTOSCOPY, URETEROSCOPY, COMBINED  11/12/2013    Procedure: COMBINED CYSTOSCOPY, URETEROSCOPY;  CYSTOSCOPY, LEFT URETEROSCOPY, LEFT EXTRACORPOREAL SHOCKWAVE LITHOTRIPSY  ;  Surgeon: Lester Orona MD;  Location: [facility]     EXTRACORPOREAL SHOCK WAVE LITHOTRIPSY (ESWL)  11/12/2013    Procedure: EXTRACORPOREAL SHOCK WAVE LITHOTRIPSY (ESWL);;  Surgeon: Lester Orona MD;  [facility]: [facility]     EXTRACORPOREAL SHOCK WAVE LITHOTRIPSY (ESWL) Bilateral 4/28/2015    Procedure: EXTRACORPOREAL SHOCK WAVE LITHOTRIPSY (ESWL);  Surgeon: Lester Orona MD;  Location: [facility]     EXTRACORPOREAL SHOCK WAVE LITHOTRIPSY, CYSTOSCOPY, INSERT STENT URETER(S), COMBINED  1/10/2012    Procedure:COMBINED EXTRACORPOREAL  SHOCK WAVE LITHOTRIPSY, CYSTOSCOPY, INSERT STENT URETER(S); LEFT EXTRACORPOREAL SHOCKWAVE LITHOTRIPSY, LEFT STENT; Surgeon:LESTER ORONA; Location:[facility]     GALLBLADDER SURGERY  2009     HYSTERECTOMY VAGINAL  1993    left  the ovaries     LASER HOLMIUM LITHOTRIPSY URETER(S), INSERT STENT, COMBINED  4/17/2013    Procedure: COMBINED CYSTOSCOPY, URETEROSCOPY, LASER HOLMIUM LITHOTRIPSY URETER(S), INSERT STENT;  CYSTOSCOPY, Right URETEROSCOPY, LASER HOLMIUM LITHOTRIPSY URETER(S) standby only,Stone basketing, Right Retrogrades, ;  Surgeon: Lester Orona MD;  Location: [facility]     LASER HOLMIUM LITHOTRIPSY URETER(S), INSERT STENT, COMBINED Bilateral 9/13/2019    Procedure: Cystoscopy, bilateral retrograde pyelograms, interpretation of fluoroscopic images, bilateral ureteroscopy with holmium laser lithotripsy and stone basketing, placement of bilateral 5 x 24 double-J ureteral stents; 22 modifier for difficult and lengthy case;  Surgeon: Lester Orona MD;  Location: [facility]     SHOULDER SURGERY  2009    left rotator cuff         PHYSICAL EXAM:    General: Alert and oriented to time, place, and self. In NAD   HEENT: Head AT/NC, EOMI, CN Grossly intact   Lungs: no respiratory distress, or pursed lip breathing   Heart: No obvious jugular venous distension present   Musculoskeltal: Normal movements. Normal appearing musculature  Skin: no suspicious lesions or rashes   Neuro: Alert, oriented, speech and mentation normal; moving all 4 extremities equally.   Psych: affect and mood normal        Urinalysis:  UA RESULTS:  Recent Labs   Lab Test 08/19/19  1438 06/08/19  0636   COLOR Yellow Light Yellow   APPEARANCE Clear Clear   URINEGLC 100* 1000*   URINEBILI Negative Negative   URINEKETONE Trace* 10*   SG 1.025 1.016   UBLD Negative Moderate*   URINEPH 5.5 5.0   PROTEIN Trace* 10*   UROBILINOGEN 0.2  --    NITRITE Negative Negative   LEUKEST Negative Trace*   RBCU  --  33*   WBCU  --  4       IMPRESSION:     Bilateral kidney stones    PLAN:    We discussed her recent CT scan imaging findings.  Her kidney stone burden is substantially less than it was last year after procedure but she does have a few stones on each side.  We discussed her options of observation versus repeating ureteroscopy.  I do feel that at some point she will need to have another bilateral ureteroscopy performed in order to remove her stone burden.  We are in an especially a severe stage of the coronavirus pandemic right now and she wishes to not do any procedures at this time.  I agree that observation would be warranted at this time.  She also tells me that her insurance will be dropping [place] at the end of the year so she will need to find a new urologist.  I recommended that in summer 2020 when she establish care with a new urologist and discussed likely proceeding with bilateral ureteroscopy.  She is welcome to follow-up again in the future as well.    Lester Orona M.D.    Video-Visit Details    Type of service:  Video Visit    Video Start Time: 3:00  Video End Time: 3:56 PM    Originating Location (pt. Location): Home    Distant Location (provider location):  [facility] UROLOGY CLINIC [place]     Platform used for Video Visit: [employer]  Lester Orona MD

## 2020-11-19 NOTE — PROGRESS NOTES
"Debi Mo is a 75 year old female who is being evaluated via a billable video visit.      The patient has been notified of following:     \"This video visit will be conducted via a call between you and your physician/provider. We have found that certain health care needs can be provided without the need for an in-person physical exam.  This service lets us provide the care you need with a video conversation.  If a prescription is necessary we can send it directly to your pharmacy.  If lab work is needed we can place an order for that and you can then stop by our lab to have the test done at a later time.    Video visits are billed at different rates depending on your insurance coverage.  Please reach out to your insurance provider with any questions.    If during the course of the call the physician/provider feels a video visit is not appropriate, you will not be charged for this service.\"    Patient has given verbal consent for Video visit? Yes  How would you like to obtain your AVS? MyChart  If you are dropped from the video visit, the video invite should be resent to: Text to cell phone: [phone]  Will anyone else be joining your video visit? No      VALERIE Leslie [facility]      Office Visit Note  [facility]rology [facility]  [phone]    Nov 19, 2020    [unfilled]    1945    UROLOGIC DIAGNOSES:    History of stones    CURRENT INTERVENTIONS:        History:    Berna underwent extensive and lengthy bilateral ureteroscopy 1 year ago to remove bilateral kidney stones.  She has felt well since her surgery 1 year ago and has had no symptoms or flank pain.      Imaging: I reviewed her CT scan images and compared them to her 2019 images.  Stone burden is much improved on both sides.  She does have 3 stones on the right side, the largest of which measures about 6 mm.  On the left side there is a small cluster of stones in the lower pole.    Labs:      MEDICATIONS:    Current Outpatient Medications:      aspirin " 81 MG tablet, Take 81 mg by mouth daily, Disp: , Rfl:      atorvastatin (LIPITOR) 20 MG tablet, Take 1 tablet (20 mg) by mouth daily, Disp: 90 tablet, Rfl: 1     blood glucose monitoring (ONE TOUCH DELICA) lancets, Use to test blood sugars 1 times daily or as directed., Disp: 100 each, Rfl: 11     blood glucose monitoring (ONE TOUCH VERIO IQ) test strip, Use to test blood sugars 1 times daily or as directed., Disp: 50 each, Rfl: 11     blood glucose monitoring (ONETOUCH VERIO) meter device kit, Use to test blood sugars 1 times daily or as directed., Disp: 1 kit, Rfl: 0     Cholecalciferol (VITAMIN D3 PO), Take 1,000 Units by mouth daily, Disp: , Rfl:      Coenzyme Q10 (CO Q 10 PO), Take 100 mg by mouth daily , Disp: , Rfl:      cyanocobalamin (VITAMIN B-12) 1000 MCG tablet, Take 1 tablet (1,000 mcg) by mouth daily, Disp: 100 tablet, Rfl: 1     lisinopril (ZESTRIL) 20 MG tablet, Take 1 tablet (20 mg) by mouth daily, Disp: 90 tablet, Rfl: 3     loratadine (CLARITIN) 10 MG tablet, Take 1 tablet (10 mg) by mouth daily, Disp: 90 tablet, Rfl: 0     metFORMIN (GLUCOPHAGE) 500 MG tablet, Take 2 tablets (1,000 mg) by mouth 2 times daily (with meals), Disp: 360 tablet, Rfl: 3     potassium citrate (UROCIT-K) 10 MEQ (1080 MG) CR tablet, Take 1 tablet (10 mEq) by mouth 3 times daily (with meals), Disp: 90 tablet, Rfl: 11    ALLERGIES:     Allergies   Allergen Reactions     Dust Mites      Seasonal Allergies        REVIEW OF SYSTEMS:   Skin: No rash, pruritis, or skin pigmentation  Eyes: No changes in vision  Ears/Nose/Throat: No changes in hearing, no nosebleeds  Respiratory: No shortness of breath, dyspnea on exertion, cough, or hemoptysis  Cardiovascular: No chest pain or palpitations  Gastrointestinal: No diarrhea or constipation. No abdominal pain. No hematochezia  Genitourinary: see HPI  Musculoskeletal: No pain or swelling of joints, normal range of motion  Neurologic: No weakness or tremors  Psychiatric: No recent  changes in memory or mood  Hematologic/Lymphatic/Immunologic: No easy bruising or enlarged lymph nodes  Endocrine: No weight gain or loss    SURGICAL HISTORY:    Past Surgical History:   Procedure Laterality Date     CARDIAC SURGERY      ANGIOGRAM     CHOLECYSTECTOMY       COLONOSCOPY       COLONOSCOPY Left 1/16/2020    Procedure: COLONOSCOPY, WITH POLYPECTOMY AND BIOPSY polypectomy using jumbo cold bx forcep;  Surgeon: Katie Eric MD;  [facility]: [facility]     COMBINED CYSTOSCOPY, RETROGRADES, URETEROSCOPY, LASER HOLMIUM LITHOTRIPSY URETER(S), INSERT STENT Bilateral 9/28/2016    Procedure: COMBINED CYSTOSCOPY, RETROGRADES, URETEROSCOPY, LASER HOLMIUM LITHOTRIPSY URETER(S), INSERT STENT;  Surgeon: Lester Orona MD;  [facility]: [facility]     CYSTOSCOPY       CYSTOSCOPY, RETROGRADES, COMBINED  4/17/2013    Procedure: COMBINED CYSTOSCOPY, RETROGRADES;;  Surgeon: Lester Orona MD;  [facility]: [facility]     CYSTOSCOPY, URETEROSCOPY, COMBINED  11/12/2013    Procedure: COMBINED CYSTOSCOPY, URETEROSCOPY;  CYSTOSCOPY, LEFT URETEROSCOPY, LEFT EXTRACORPOREAL SHOCKWAVE LITHOTRIPSY  ;  Surgeon: Lester Orona MD;  Location:  OR     EXTRACORPOREAL SHOCK WAVE LITHOTRIPSY (ESWL)  11/12/2013    Procedure: EXTRACORPOREAL SHOCK WAVE LITHOTRIPSY (ESWL);;  Surgeon: Lester Orona MD;  [facility]     EXTRACORPOREAL SHOCK WAVE LITHOTRIPSY (ESWL) Bilateral 4/28/2015    Procedure: EXTRACORPOREAL SHOCK WAVE LITHOTRIPSY (ESWL);  Surgeon: Lester Orona MD;  [facility]: [facility]     EXTRACORPOREAL SHOCK WAVE LITHOTRIPSY, CYSTOSCOPY, INSERT STENT URETER(S), COMBINED  1/10/2012    Procedure:COMBINED EXTRACORPOREAL SHOCK WAVE LITHOTRIPSY, CYSTOSCOPY, INSERT STENT URETER(S); LEFT EXTRACORPOREAL SHOCKWAVE LITHOTRIPSY, LEFT STENT; Surgeon:LESTER ORONA; Location:[facility] OR     GALLBLADDER SURGERY  2009     HYSTERECTOMY VAGINAL  1993    left  the ovaries     LASER HOLMIUM LITHOTRIPSY URETER(S), INSERT  STENT, COMBINED  4/17/2013    Procedure: COMBINED CYSTOSCOPY, URETEROSCOPY, LASER HOLMIUM LITHOTRIPSY URETER(S), INSERT STENT;  CYSTOSCOPY, Right URETEROSCOPY, LASER HOLMIUM LITHOTRIPSY URETER(S) standby only,Stone basketing, Right Retrogrades, ;  Surgeon: Lester Orona MD;  [facility]: [facility]     LASER HOLMIUM LITHOTRIPSY URETER(S), INSERT STENT, COMBINED Bilateral 9/13/2019    Procedure: Cystoscopy, bilateral retrograde pyelograms, interpretation of fluoroscopic images, bilateral ureteroscopy with holmium laser lithotripsy and stone basketing, placement of bilateral 5 x 24 double-J ureteral stents; 22 modifier for difficult and lengthy case;  Surgeon: Lester Orona MD;  Location: [facility] OR     SHOULDER SURGERY  2009    left rotator cuff         PHYSICAL EXAM:    General: Alert and oriented to time, place, and self. In NAD   HEENT: Head AT/NC, EOMI, CN Grossly intact   Lungs: no respiratory distress, or pursed lip breathing   Heart: No obvious jugular venous distension present   Musculoskeltal: Normal movements. Normal appearing musculature  Skin: no suspicious lesions or rashes   Neuro: Alert, oriented, speech and mentation normal; moving all 4 extremities equally.   Psych: affect and mood normal        Urinalysis:  UA RESULTS:  Recent Labs   Lab Test 08/19/19  1438 06/08/19  0636   COLOR Yellow Light Yellow   APPEARANCE Clear Clear   URINEGLC 100* 1000*   URINEBILI Negative Negative   URINEKETONE Trace* 10*   SG 1.025 1.016   UBLD Negative Moderate*   URINEPH 5.5 5.0   PROTEIN Trace* 10*   UROBILINOGEN 0.2  --    NITRITE Negative Negative   LEUKEST Negative Trace*   RBCU  --  33*   WBCU  --  4         IMPRESSION:    Bilateral kidney stones    PLAN:    We discussed her recent CT scan imaging findings.  Her kidney stone burden is substantially less than it was last year after procedure but she does have a few stones on each side.  We discussed her options of observation versus repeating ureteroscopy.   I do feel that at some point she will need to have another bilateral ureteroscopy performed in order to remove her stone burden.  We are in an especially a severe stage of the coronavirus pandemic right now and she wishes to not do any procedures at this time.  I agree that observation would be warranted at this time.  She also tells me that her insurance will be dropping [place] at the end of the year so she will need to find a new urologist.  I recommended that in summer 2020 when she establish care with a new urologist and discussed likely proceeding with bilateral ureteroscopy.  She is welcome to follow-up again in the future as well.      Lester Orona M.D.          Video-Visit Details    Type of service:  Video Visit    Video Start Time: 3:00  Video End Time: 3:56 PM    Originating Location (pt. Location): Home    Distant Location (provider location):  [facility] UROLOGY CLINIC [place]     Platform used for Video Visit: [email or web address]    Lester Orona MD

## 2021-01-12 ENCOUNTER — TELEPHONE (OUTPATIENT)
Dept: EDUCATION SERVICES | Facility: CLINIC | Age: 76
End: 2021-01-12

## 2021-01-12 NOTE — TELEPHONE ENCOUNTER
Diabetes Education Scheduling Request    Debi Mo would benefit from proactive diabetes education scheduling outreach per the [facility] Primary Care Transformation model. Please help patient to schedule diabetes education (scheduling script below). Document scheduling attempts and once scheduled, please route this chart to JULIANNA LEDESMA (RN) [[id number]].     If patient has not been reached to schedule after 3 attempts (2 phone calls and letter or MyChart message) or declined to schedule, please route back to P Diab Ed-Patient Care.    Patient Outreach Script    Amber Coronel,    I'm calling on behalf of your primary care provider, Juan C Brown, at the [facility]. Your care with Dr. Brown offers an integrated service with diabetes education to help you improve your diabetes. Dr. Brown would like to invite you to work with one of our clinic's diabetes care and education specialists.    -These specially trained nurses and registered dietitians are an important part of the team that will help you improve your diabetes management. They offer diabetes and nutrition education and work directly with your provider to help adjust medications, if needed.     -Most visits can be done virtually.     -Diabetes education is usually covered yearly by most insurance plans, but please check with your insurance company regarding coverage.     May I help to schedule this appointment with you?     Thank you!

## 2021-01-12 NOTE — TELEPHONE ENCOUNTER
Diabetes Education Scheduling Outreach #1:    Call to patient to schedule. Patient declined to schedule and stated that she has [employer] health insurance. She is no longer going to [facility].    Mary Benavidez  [place] [facility]  Diabetes and Nutrition Scheduling

## 2021-01-31 ENCOUNTER — IMMUNIZATION (OUTPATIENT)
Dept: NURSING | Facility: CLINIC | Age: 76
End: 2021-01-31
Payer: MEDICARE

## 2021-01-31 PROCEDURE — 0001A PR COVID VAC PFIZER DIL RECON 30 MCG/0.3 ML IM: CPT

## 2021-01-31 PROCEDURE — 91300 PR COVID VAC PFIZER DIL RECON 30 MCG/0.3 ML IM: CPT

## 2021-02-21 ENCOUNTER — IMMUNIZATION (OUTPATIENT)
Dept: NURSING | Facility: CLINIC | Age: 76
End: 2021-02-21
Attending: INTERNAL MEDICINE
Payer: COMMERCIAL

## 2021-02-21 PROCEDURE — 0002A PR COVID VAC PFIZER DIL RECON 30 MCG/0.3 ML IM: CPT

## 2021-02-21 PROCEDURE — 91300 PR COVID VAC PFIZER DIL RECON 30 MCG/0.3 ML IM: CPT

## 2021-04-03 DIAGNOSIS — E53.8 VITAMIN B12 DEFICIENCY WITHOUT ANEMIA: ICD-10-CM

## 2021-04-05 RX ORDER — LANOLIN ALCOHOL/MO/W.PET/CERES
1000 CREAM (GRAM) TOPICAL DAILY
Qty: 100 TABLET | Refills: 1 | Status: SHIPPED | OUTPATIENT
Start: 2021-04-05

## 2021-04-26 DIAGNOSIS — N20.0 CALCULUS OF KIDNEY: ICD-10-CM

## 2021-04-26 RX ORDER — POTASSIUM CITRATE 10 MEQ/1
TABLET, EXTENDED RELEASE ORAL
Qty: 270 TABLET | Refills: 1 | Status: SHIPPED | OUTPATIENT
Start: 2021-04-26

## 2021-05-09 ENCOUNTER — HEALTH MAINTENANCE LETTER (OUTPATIENT)
Age: 76
End: 2021-05-09

## 2021-10-24 ENCOUNTER — HEALTH MAINTENANCE LETTER (OUTPATIENT)
Age: 76
End: 2021-10-24

## 2021-10-30 DIAGNOSIS — E11.42 TYPE 2 DIABETES MELLITUS WITH DIABETIC POLYNEUROPATHY, WITHOUT LONG-TERM CURRENT USE OF INSULIN (H): ICD-10-CM

## 2021-11-01 NOTE — TELEPHONE ENCOUNTER
Routing refill request to provider for review/approval because:  Biguanide Agents Acpztb24/30/2021 09:48 AM   Patient has documented A1c within the specified period of time. Protocol Details    Recent (6 mo) or future (30 days) visit within the authorizing provider's specialty      Lab Results   Component Value Date    A1C 7.8 11/02/2020    A1C 7.6 08/06/2020    A1C Canceled, Test credited 04/30/2020    A1C 7.8 04/18/2020    A1C 6.9 09/11/2019     Mary Winn RN

## 2021-11-02 NOTE — TELEPHONE ENCOUNTER
Please call patient and see if she is still following with [facility] - I recalled that she had [employer] so was going to go elsewhere for care. Can route back to me if she needs refills.     I hope she is well.     LEELEE Brown MD  Internal Medicine-Pediatrics

## 2021-11-03 NOTE — TELEPHONE ENCOUNTER
Called patient and she states that she is going through [facility] for her care now and does not need us to fill her medications any more, will notify pharmacy.     Closing encounter.     Karen Michael, CMA

## 2021-11-09 DIAGNOSIS — E78.5 HYPERLIPIDEMIA LDL GOAL <70: ICD-10-CM

## 2021-11-09 DIAGNOSIS — E78.1 HIGH TRIGLYCERIDES: ICD-10-CM

## 2021-11-09 DIAGNOSIS — I25.10 CORONARY ARTERY DISEASE INVOLVING NATIVE CORONARY ARTERY OF NATIVE HEART WITHOUT ANGINA PECTORIS: ICD-10-CM

## 2021-11-10 RX ORDER — ATORVASTATIN CALCIUM 20 MG/1
20 TABLET, FILM COATED ORAL DAILY
Qty: 90 TABLET | Refills: 0 | Status: SHIPPED | OUTPATIENT
Start: 2021-11-10

## 2021-11-10 NOTE — TELEPHONE ENCOUNTER
Medication is being filled for 1 time refill only due to:  Patient needs to be seen because it has been more than one year since last visit.    Please call patient and assist with scheduling prior to additional refills.    Breanne CONRAD - Registered Nurse  [facility]  Acute and Diagnostic Services

## 2021-11-10 NOTE — TELEPHONE ENCOUNTER
Patient is no longer a patient of ours  (see previous encounter). Calling pharmacy to have them remove automatic refills for patient.     Karen Michael, CMA

## 2021-12-19 ENCOUNTER — HEALTH MAINTENANCE LETTER (OUTPATIENT)
Age: 76
End: 2021-12-19

## 2022-02-02 DIAGNOSIS — E78.5 HYPERLIPIDEMIA LDL GOAL <70: ICD-10-CM

## 2022-02-02 DIAGNOSIS — E78.1 HIGH TRIGLYCERIDES: ICD-10-CM

## 2022-02-02 DIAGNOSIS — I25.10 CORONARY ARTERY DISEASE INVOLVING NATIVE CORONARY ARTERY OF NATIVE HEART WITHOUT ANGINA PECTORIS: ICD-10-CM

## 2022-02-02 NOTE — TELEPHONE ENCOUNTER
Routing refill request to provider for review/approval because:  Celeste given x 1 and patient did not follow up, please advise  Labs not current:  LDL  Patient needs to be seen because it has been more than 1 year since last office visit.    Michelle Irwin RN

## 2022-02-03 NOTE — TELEPHONE ENCOUNTER
I believe she is a [facility] patient - please call pharmacy.     LEELEE Brown MD  Internal Medicine-Pediatrics

## 2022-02-04 RX ORDER — ATORVASTATIN CALCIUM 20 MG/1
20 TABLET, FILM COATED ORAL DAILY
Qty: 90 TABLET | Refills: 0 | Status: CANCELLED | OUTPATIENT
Start: 2022-02-04

## 2022-07-31 ENCOUNTER — HEALTH MAINTENANCE LETTER (OUTPATIENT)
Age: 77
End: 2022-07-31

## 2022-10-15 ENCOUNTER — HEALTH MAINTENANCE LETTER (OUTPATIENT)
Age: 77
End: 2022-10-15

## 2022-12-03 ENCOUNTER — HEALTH MAINTENANCE LETTER (OUTPATIENT)
Age: 77
End: 2022-12-03

## 2023-03-26 ENCOUNTER — HEALTH MAINTENANCE LETTER (OUTPATIENT)
Age: 78
End: 2023-03-26

## 2023-11-04 ENCOUNTER — HEALTH MAINTENANCE LETTER (OUTPATIENT)
Age: 78
End: 2023-11-04

## 2024-01-13 ENCOUNTER — HEALTH MAINTENANCE LETTER (OUTPATIENT)
Age: 79
End: 2024-01-13

## 2024-06-01 ENCOUNTER — HEALTH MAINTENANCE LETTER (OUTPATIENT)
Age: 79
End: 2024-06-01

## 2025-01-01 NOTE — DISCHARGE INSTRUCTIONS
On your CT scan today we saw an incidental finding of a lung nodule.  This is tiny and does not need any emergent management.  Our recommendation is that you follow unit(s) with your regular doctor and discuss a repeat scan in 12months to assess stability.           Use tylenol and/or ibuprofen for pain or discomfort    Use Oxycodone for severe pain uncontrolled by above medications    Opioid Medication Information  You have been given a prescription for an opioid (narcotic) pain medicine and/or have received a pain medicine while here in the emergency department. These medicines can make you drowsy or impaired. You must not drive, operate dangerous equipment, or engage in any other dangerous activities while taking these medications. If you drive while taking these medications, you could be arrested for DUI, or driving under the influence. Do not drink any alcohol while you are taking these medications.   Opioid pain medications can cause addiction. If you have a history of chemical dependency of any type, you are at a higher risk of becoming addicted to pain medications. Only take these prescribed medications to treat your pain when all other options have been tried. Take it for as short a time and as few doses as possible. Store your pain pills in a secure place, as they are frequently stolen and provide a dangerous opportunity for children or visitors in your house to start abusing these powerful medications. We will not replace any lost or stolen medicine. As soon as your pain is better, you should flush all your remaining medication.   Many prescription pain medications contain Tylenol (acetaminophen), including Vicodin, Tylenol #3, Norco, Lortab, and Percocet. You should not take any extra pills of Tylenol if you are using these prescription medications or you can get very sick. Do not ever take more than 4000 mg of acetaminophen in any 24 hour period.  All opioids tend to cause constipation. Drink plenty of  Patient Education     Acute Sinusitis    Acute sinusitis is irritation and swelling of the sinuses. It is usually caused by a viral infection after a common cold. Your doctor can help you find relief.  What is acute sinusitis?  Sinuses are air-filled spaces in the skull behind the face. They are kept moist and clean by a lining of mucosa. Things such as pollen, smoke, and chemical fumes can irritate the mucosa. It can then swell up. As a response to irritation, the mucosa makes more mucus and other fluids. Tiny hairlike cilia cover the mucosa. Cilia help carry mucus toward the opening of the sinus. Too much mucus may cause the cilia to stop working. This blocks the sinus opening. A buildup of fluid in the sinuses then causes pain and pressure. It can also encourage bacteria to grow in the sinuses.  Common symptoms of acute sinusitis  You may have:  Facial soreness pain  Headache  Fever  Fluid draining in the back of the throat (postnasal drip)  Congestion  Drainage that is thick and colored, instead of clear  Cough  Diagnosing acute sinusitis  Your doctor will ask about your symptoms and health history. He or she will look at your ear, nose, and throat. You usually won't need to have X-rays taken.    The doctor may take a sample of mucus to check for bacteria. If you have sinusitis that keeps coming back, you may need imaging tests such as X-rays or CAT scans. This will help your doctor check for a structural problem that may be causing the infection.  Treating acute sinusitis  Treatment is aimed at unblocking the sinus opening and helping the cilia work again. You may need to take antihistamine and decongestant medicine. These can reduce inflammation and decrease the amount of fluid your sinuses make. If you have a bacterial infection, you will need to take antibiotic medicine for 10 to 14 days. Take this medicine until it is gone, even if you feel better.  Date Last Reviewed: 10/1/2016  © 1329-3343 [employer]. [address] [place] [address], [place] [address]. All rights reserved. This information is not intended as a substitute for professional medical care. Always follow your healthcare professional's instructions.            water and eat foods that have a lot of fiber, such as fruits, vegetables, prune juice, apple juice and high fiber cereal. Take a laxative if you don?t move your bowels at least every other day. Miralax, Milk of Magnesia, Colace, or Senna can be used to keep you regular.

## (undated) DEVICE — SHEATH URETERAL ACCESS NAVIGATOR 13/15FRX28CM 250-206

## (undated) DEVICE — COVER FOOTSWITCH W/CINCH 20X24" 923267

## (undated) DEVICE — PREP TECHNI-CARE CHLOROXYLENOL 3% 4OZ BOTTLE C222-4ZWO

## (undated) DEVICE — TUBING IRRIG TUR Y TYPE 96" LF 6543-01

## (undated) DEVICE — LINEN HALF SHEET 5512

## (undated) DEVICE — KIT ENDO TURNOVER/PROCEDURE W/CLEAN A SCOPE LINERS 103888

## (undated) DEVICE — BASKET RETRIEVAL 1.9FRX120CM ESCAPE NTNL 4 WIRE 390-201

## (undated) DEVICE — SOL WATER IRRIG 1000ML BOTTLE 2F7114

## (undated) DEVICE — PACK CYSTO CUSTOM RIDGES

## (undated) DEVICE — SOL NACL 0.9% IRRIG 3000ML BAG 2B7477

## (undated) DEVICE — GLOVE PROTEXIS POWDER FREE SMT 7.5  2D72PT75X

## (undated) DEVICE — ENDO FORCEP BX CAPTURA JUMBO SPIKE 2.8MMX230CM G53042

## (undated) DEVICE — LINEN FULL SHEET 5511

## (undated) DEVICE — GUIDEWIRE URO STR STIFF .035"X150CM NITINOL 150NSS35

## (undated) DEVICE — BAG CLEAR TRASH 1.3M 39X33" P4040C

## (undated) DEVICE — CATH URETERAL FLEX TIP TIGERTAIL 06FRX70CM 139006

## (undated) RX ORDER — METOPROLOL TARTRATE 1 MG/ML
INJECTION, SOLUTION INTRAVENOUS
Status: DISPENSED
Start: 2017-08-10

## (undated) RX ORDER — EPHEDRINE SULFATE 50 MG/ML
INJECTION, SOLUTION INTRAMUSCULAR; INTRAVENOUS; SUBCUTANEOUS
Status: DISPENSED
Start: 2019-09-13

## (undated) RX ORDER — METOPROLOL TARTRATE 1 MG/ML
INJECTION, SOLUTION INTRAVENOUS
Status: DISPENSED
Start: 2020-08-21

## (undated) RX ORDER — ONDANSETRON 2 MG/ML
INJECTION INTRAMUSCULAR; INTRAVENOUS
Status: DISPENSED
Start: 2019-09-13

## (undated) RX ORDER — METOPROLOL TARTRATE 50 MG
TABLET ORAL
Status: DISPENSED
Start: 2017-08-10

## (undated) RX ORDER — FENTANYL CITRATE 50 UG/ML
INJECTION, SOLUTION INTRAMUSCULAR; INTRAVENOUS
Status: DISPENSED
Start: 2020-01-16

## (undated) RX ORDER — ATROPA BELLADONNA AND OPIUM 16.2; 3 MG/1; MG/1
SUPPOSITORY RECTAL
Status: DISPENSED
Start: 2019-09-13

## (undated) RX ORDER — SCOLOPAMINE TRANSDERMAL SYSTEM 1 MG/1
PATCH, EXTENDED RELEASE TRANSDERMAL
Status: DISPENSED
Start: 2019-09-13

## (undated) RX ORDER — NITROGLYCERIN 0.4 MG/1
TABLET SUBLINGUAL
Status: DISPENSED
Start: 2020-08-21

## (undated) RX ORDER — NITROGLYCERIN 0.4 MG/1
TABLET SUBLINGUAL
Status: DISPENSED
Start: 2017-08-10

## (undated) RX ORDER — GLYCOPYRROLATE 0.2 MG/ML
INJECTION INTRAMUSCULAR; INTRAVENOUS
Status: DISPENSED
Start: 2019-09-13

## (undated) RX ORDER — LIDOCAINE HYDROCHLORIDE 10 MG/ML
INJECTION, SOLUTION EPIDURAL; INFILTRATION; INTRACAUDAL; PERINEURAL
Status: DISPENSED
Start: 2019-09-13

## (undated) RX ORDER — SIMETHICONE 40MG/0.6ML
SUSPENSION, DROPS(FINAL DOSAGE FORM)(ML) ORAL
Status: DISPENSED
Start: 2020-01-16

## (undated) RX ORDER — PROPOFOL 10 MG/ML
INJECTION, EMULSION INTRAVENOUS
Status: DISPENSED
Start: 2019-09-13

## (undated) RX ORDER — DEXAMETHASONE SODIUM PHOSPHATE 4 MG/ML
INJECTION, SOLUTION INTRA-ARTICULAR; INTRALESIONAL; INTRAMUSCULAR; INTRAVENOUS; SOFT TISSUE
Status: DISPENSED
Start: 2019-09-13

## (undated) RX ORDER — FENTANYL CITRATE 50 UG/ML
INJECTION, SOLUTION INTRAMUSCULAR; INTRAVENOUS
Status: DISPENSED
Start: 2019-09-13

## (undated) RX ORDER — PHENYLEPHRINE HCL IN 0.9% NACL 1 MG/10 ML
SYRINGE (ML) INTRAVENOUS
Status: DISPENSED
Start: 2019-09-13

## (undated) RX ORDER — ACETAMINOPHEN 325 MG/1
TABLET ORAL
Status: DISPENSED
Start: 2019-09-13

## (undated) RX ORDER — METOPROLOL TARTRATE 50 MG
TABLET ORAL
Status: DISPENSED
Start: 2020-08-21

## (undated) RX ORDER — CEFAZOLIN SODIUM 2 G/100ML
INJECTION, SOLUTION INTRAVENOUS
Status: DISPENSED
Start: 2019-09-13